# Patient Record
Sex: FEMALE | Race: BLACK OR AFRICAN AMERICAN | NOT HISPANIC OR LATINO | Employment: OTHER | URBAN - METROPOLITAN AREA
[De-identification: names, ages, dates, MRNs, and addresses within clinical notes are randomized per-mention and may not be internally consistent; named-entity substitution may affect disease eponyms.]

---

## 2020-05-12 ENCOUNTER — OFFICE VISIT (OUTPATIENT)
Dept: FAMILY MEDICINE | Facility: CLINIC | Age: 55
End: 2020-05-12
Payer: COMMERCIAL

## 2020-05-12 VITALS
DIASTOLIC BLOOD PRESSURE: 97 MMHG | HEART RATE: 70 BPM | BODY MASS INDEX: 22.91 KG/M2 | WEIGHT: 146 LBS | TEMPERATURE: 97 F | SYSTOLIC BLOOD PRESSURE: 142 MMHG | RESPIRATION RATE: 16 BRPM | HEIGHT: 67 IN

## 2020-05-12 DIAGNOSIS — M32.9 LUPUS: ICD-10-CM

## 2020-05-12 DIAGNOSIS — Z76.89 ENCOUNTER TO ESTABLISH CARE: ICD-10-CM

## 2020-05-12 DIAGNOSIS — R25.1 TREMOR OF BOTH HANDS: Chronic | ICD-10-CM

## 2020-05-12 DIAGNOSIS — I10 HTN (HYPERTENSION), BENIGN: Chronic | ICD-10-CM

## 2020-05-12 DIAGNOSIS — Z00.00 WELLNESS EXAMINATION: Primary | ICD-10-CM

## 2020-05-12 DIAGNOSIS — E03.9 HYPOTHYROIDISM, UNSPECIFIED TYPE: ICD-10-CM

## 2020-05-12 LAB
ABS NRBC COUNT: 0 X 10 3/UL (ref 0–0.01)
ABSOLUTE BASOPHIL: 0.04 X 10 3/UL (ref 0–0.22)
ABSOLUTE EOSINOPHIL: 0.11 X 10 3/UL (ref 0.04–0.54)
ABSOLUTE IMMATURE GRAN: 0 X 10 3/UL (ref 0–0.04)
ABSOLUTE LYMPHOCYTE: 1.92 X 10 3/UL (ref 0.86–4.75)
ABSOLUTE MONOCYTE: 0.74 X 10 3/UL (ref 0.22–1.08)
ALBUMIN SERPL-MCNC: 4.3 G/DL (ref 3.5–5.2)
ALBUMIN/GLOB SERPL ELPH: 1.2 {RATIO} (ref 1–2.7)
ALP ISOS SERPL LEV INH-CCNC: 114 U/L (ref 35–105)
ALT (SGPT): 12 U/L (ref 0–33)
ANION GAP SERPL CALC-SCNC: 13 MMOL/L (ref 8–17)
AST SERPL-CCNC: 14 U/L (ref 0–32)
B12: 503 PG/ML (ref 232–1245)
BASOPHILS NFR BLD: 0.8 % (ref 0.2–1.2)
BILIRUBIN, TOTAL: 0.29 MG/DL (ref 0–1.2)
BUN/CREAT SERPL: 13.3 (ref 6–20)
CALCIUM SERPL-MCNC: 9.3 MG/DL (ref 8.6–10.2)
CARBON DIOXIDE, CO2: 25 MMOL/L (ref 22–29)
CHLORIDE: 104 MMOL/L (ref 98–107)
CHOLEST SERPL-MSCNC: 149 MG/DL (ref 100–200)
CREAT SERPL-MCNC: 0.75 MG/DL (ref 0.5–0.9)
EOSINOPHIL NFR BLD: 2.2 % (ref 0.7–7)
ESTIMATED AVERAGE GLUCOSE: 112 MG/DL
GFR ESTIMATION: 80.83
GLOBULIN: 3.6 G/DL (ref 1.5–4.5)
GLUCOSE: 97 MG/DL (ref 74–106)
HBA1C MFR BLD: 5.5 % (ref 4–6)
HCT VFR BLD AUTO: 41.5 % (ref 37–47)
HDLC SERPL-MCNC: 43 MG/DL
HGB BLD-MCNC: 13.5 G/DL (ref 12–16)
IMMATURE GRANULOCYTES: 0 % (ref 0–0.5)
LDL/HDL RATIO: 2 (ref 1–3)
LDLC SERPL CALC-MCNC: 85.2 MG/DL (ref 0–100)
LYMPHOCYTES NFR BLD: 38.8 % (ref 19.3–53.1)
MCH RBC QN AUTO: 30.8 PG (ref 27–32)
MCHC RBC AUTO-ENTMCNC: 32.5 G/DL (ref 32–36)
MCV RBC AUTO: 94.7 FL (ref 82–100)
MONOCYTES NFR BLD: 14.9 % (ref 4.7–12.5)
NEUTROPHILS ABSOLUTE COUNT: 2.14 X 10 3/UL (ref 2.15–7.56)
NEUTROPHILS NFR BLD: 43.3 %
NUCLEATED RED BLOOD CELLS: 0 /100 WBC (ref 0–0.2)
PLATELET # BLD AUTO: 312 X 10 3/UL (ref 135–400)
POTASSIUM: 3.8 MMOL/L (ref 3.5–5.1)
PROT SNV-MCNC: 7.9 G/DL (ref 6.4–8.3)
RBC # BLD AUTO: 4.38 X 10 6/UL (ref 4.2–5.4)
RDW-SD: 50.5 FL (ref 37–54)
SODIUM: 142 MMOL/L (ref 136–145)
TRIGL SERPL-MCNC: 104 MG/DL (ref 0–150)
TSH SERPL DL<=0.005 MIU/L-ACNC: 0.92 UIU/ML (ref 0.27–4.2)
UREA NITROGEN (BUN): 10 MG/DL (ref 6–20)
VITAMIN D (25OHD): 34.2 NG/ML
WBC # BLD: 4.95 X 10 3/UL (ref 4.3–10.8)

## 2020-05-12 PROCEDURE — 3077F SYST BP >= 140 MM HG: CPT | Mod: CPTII,S$GLB,, | Performed by: FAMILY MEDICINE

## 2020-05-12 PROCEDURE — 99386 PREV VISIT NEW AGE 40-64: CPT | Mod: S$GLB,,, | Performed by: FAMILY MEDICINE

## 2020-05-12 PROCEDURE — 99386 PR PREVENTIVE VISIT,NEW,40-64: ICD-10-PCS | Mod: S$GLB,,, | Performed by: FAMILY MEDICINE

## 2020-05-12 PROCEDURE — 3080F DIAST BP >= 90 MM HG: CPT | Mod: CPTII,S$GLB,, | Performed by: FAMILY MEDICINE

## 2020-05-12 PROCEDURE — 3077F PR MOST RECENT SYSTOLIC BLOOD PRESSURE >= 140 MM HG: ICD-10-PCS | Mod: CPTII,S$GLB,, | Performed by: FAMILY MEDICINE

## 2020-05-12 PROCEDURE — 3080F PR MOST RECENT DIASTOLIC BLOOD PRESSURE >= 90 MM HG: ICD-10-PCS | Mod: CPTII,S$GLB,, | Performed by: FAMILY MEDICINE

## 2020-05-12 RX ORDER — FOLIC ACID 1 MG/1
1 TABLET ORAL DAILY
COMMUNITY
Start: 2020-03-18 | End: 2022-11-10

## 2020-05-12 RX ORDER — PROGESTERONE 200 MG/1
1 CAPSULE ORAL NIGHTLY
COMMUNITY
Start: 2020-04-27 | End: 2022-04-14

## 2020-05-12 RX ORDER — AMLODIPINE BESYLATE 10 MG/1
10 TABLET ORAL DAILY
COMMUNITY
End: 2020-05-12 | Stop reason: SDUPTHER

## 2020-05-12 RX ORDER — HYDROCHLOROTHIAZIDE 25 MG/1
25 TABLET ORAL DAILY
Qty: 30 TABLET | Refills: 0 | Status: SHIPPED | OUTPATIENT
Start: 2020-05-12 | End: 2020-05-25 | Stop reason: SDUPTHER

## 2020-05-12 RX ORDER — METHOTREXATE 2.5 MG/1
4 TABLET ORAL WEEKLY
Status: ON HOLD | COMMUNITY
Start: 2020-04-14 | End: 2021-02-11 | Stop reason: HOSPADM

## 2020-05-12 RX ORDER — ESTRADIOL 1 MG/1
1 TABLET ORAL DAILY
COMMUNITY
Start: 2020-04-14 | End: 2022-04-14

## 2020-05-12 RX ORDER — LEVOTHYROXINE SODIUM 50 UG/1
1 TABLET ORAL EVERY MORNING
COMMUNITY
Start: 2020-04-14 | End: 2020-05-25 | Stop reason: SDUPTHER

## 2020-05-12 RX ORDER — AMLODIPINE BESYLATE 10 MG/1
10 TABLET ORAL DAILY
Qty: 90 TABLET | Refills: 3 | Status: SHIPPED | OUTPATIENT
Start: 2020-05-12 | End: 2022-11-10

## 2020-05-12 NOTE — PROGRESS NOTES
Subjective:       Patient ID: Christen Bowman is a 55 y.o. female.    Chief Complaint: Establish Care (pt is here to get established with a PCP and get BP meds refilled. )    56 yo F here to establish care and for a wellness. She was seen by specialists in the past, not by PCP. Pt has hx of hypothyroidism, female hormone imbalance (sees endocrinologist in [place]), rheumatological issues for which she sees a specialist in [place].  Pt feels well, she is okay getting the yearly labs drawn today. We go through her health maintenance chart and fill in the blanks. She will ask her insurance whether they pay for Cologuard or how much a colonoscopy would be.   BP is not controlled. Pt states her BP usually runs high just like today. She is on amlodipine 10 mg. Discussed to add a thiazide.      Review of Systems   Constitutional: Negative for activity change, chills, fatigue, fever and unexpected weight change.   HENT: Negative for ear pain, rhinorrhea and trouble swallowing.    Eyes: Negative for pain.   Respiratory: Negative for cough, chest tightness, shortness of breath and wheezing.    Cardiovascular: Negative for chest pain and palpitations.   Gastrointestinal: Negative for abdominal distention, abdominal pain, constipation, diarrhea, nausea and vomiting.   Endocrine: Negative for cold intolerance and heat intolerance.   Genitourinary: Negative for dysuria, frequency and urgency.   Musculoskeletal: Negative for myalgias.   Skin: Negative for rash.   Neurological: Negative for dizziness, syncope, light-headedness and headaches.   Hematological: Does not bruise/bleed easily.   Psychiatric/Behavioral: Negative for agitation and confusion.       Objective:      Physical Exam   Constitutional: She appears well-developed.   HENT:   Right Ear: External ear normal.   Left Ear: External ear normal.   Mouth/Throat: Oropharynx is clear and moist.   Eyes: Conjunctivae and EOM are normal.   Neck: Normal range of motion.    Cardiovascular: Normal rate, regular rhythm and intact distal pulses.   Pulmonary/Chest: Effort normal and breath sounds normal.   Abdominal: Soft.   Musculoskeletal: Normal range of motion.   Neurological: She is alert.   Tremors in both hands   Skin: Skin is warm. Capillary refill takes less than 2 seconds.   Psychiatric: She has a normal mood and affect. Her behavior is normal.   Nursing note and vitals reviewed.      Assessment:       1. Wellness examination    2. Encounter to establish care    3. Lupus, RA, Raynauds etc - Rheumatologist in [place] Stable   4. HTN (hypertension), benign Adjusting regimen per notes   5. Hypothyroidism, unspecified type    6. Tremor of both hands  x 40 yrs Stable       Plan:       PROBLEM MAC Velásquez was seen today for establish care.    Diagnoses and all orders for this visit:    Wellness examination  -     CBC auto differential; Future  -     Comprehensive metabolic panel; Future  -     Lipid Panel; Future  -     Vitamin D; Future  -     Hemoglobin A1C; Future  -     TSH; Future  -     Vitamin B12; Future  -     CBC auto differential  -     Comprehensive metabolic panel  -     Lipid Panel  -     Vitamin D  -     Hemoglobin A1C  -     TSH  -     Vitamin B12    Encounter to establish care    Lupus, RA, Raynauds etc - Rheumatologist in [place]  Comments:  continue meds    HTN (hypertension), benign  Comments:  adding HCTZ  Orders:  -     CBC auto differential; Future  -     Comprehensive metabolic panel; Future  -     hydroCHLOROthiazide (HYDRODIURIL) 25 MG tablet; Take 1 tablet (25 mg total) by mouth once daily.  -     amLODIPine (NORVASC) 10 MG tablet; Take 1 tablet (10 mg total) by mouth once daily.  -     CBC auto differential  -     Comprehensive metabolic panel    Hypothyroidism, unspecified type  -     TSH; Future  -     TSH    Tremor of both hands  x 40 yrs  Comments:  worsening with being upset

## 2020-05-13 ENCOUNTER — PATIENT MESSAGE (OUTPATIENT)
Dept: FAMILY MEDICINE | Facility: CLINIC | Age: 55
End: 2020-05-13

## 2020-05-25 ENCOUNTER — OFFICE VISIT (OUTPATIENT)
Dept: FAMILY MEDICINE | Facility: CLINIC | Age: 55
End: 2020-05-25
Payer: COMMERCIAL

## 2020-05-25 VITALS
TEMPERATURE: 98 F | DIASTOLIC BLOOD PRESSURE: 94 MMHG | SYSTOLIC BLOOD PRESSURE: 131 MMHG | RESPIRATION RATE: 16 BRPM | HEIGHT: 67 IN | BODY MASS INDEX: 22.29 KG/M2 | OXYGEN SATURATION: 95 % | HEART RATE: 79 BPM | WEIGHT: 142 LBS

## 2020-05-25 DIAGNOSIS — Z71.2 ENCOUNTER TO DISCUSS TEST RESULTS: Primary | ICD-10-CM

## 2020-05-25 DIAGNOSIS — E03.9 HYPOTHYROIDISM, UNSPECIFIED TYPE: Chronic | ICD-10-CM

## 2020-05-25 DIAGNOSIS — I10 HTN (HYPERTENSION), BENIGN: Chronic | ICD-10-CM

## 2020-05-25 DIAGNOSIS — N95.1 MENOPAUSAL HOT FLUSHES: Chronic | ICD-10-CM

## 2020-05-25 PROCEDURE — 3080F DIAST BP >= 90 MM HG: CPT | Mod: CPTII,S$GLB,, | Performed by: FAMILY MEDICINE

## 2020-05-25 PROCEDURE — 99214 OFFICE O/P EST MOD 30 MIN: CPT | Mod: S$GLB,,, | Performed by: FAMILY MEDICINE

## 2020-05-25 PROCEDURE — 3080F PR MOST RECENT DIASTOLIC BLOOD PRESSURE >= 90 MM HG: ICD-10-PCS | Mod: CPTII,S$GLB,, | Performed by: FAMILY MEDICINE

## 2020-05-25 PROCEDURE — 3008F BODY MASS INDEX DOCD: CPT | Mod: CPTII,S$GLB,, | Performed by: FAMILY MEDICINE

## 2020-05-25 PROCEDURE — 99214 PR OFFICE/OUTPT VISIT, EST, LEVL IV, 30-39 MIN: ICD-10-PCS | Mod: S$GLB,,, | Performed by: FAMILY MEDICINE

## 2020-05-25 PROCEDURE — 3075F SYST BP GE 130 - 139MM HG: CPT | Mod: CPTII,S$GLB,, | Performed by: FAMILY MEDICINE

## 2020-05-25 PROCEDURE — 3075F PR MOST RECENT SYSTOLIC BLOOD PRESS GE 130-139MM HG: ICD-10-PCS | Mod: CPTII,S$GLB,, | Performed by: FAMILY MEDICINE

## 2020-05-25 PROCEDURE — 3008F PR BODY MASS INDEX (BMI) DOCUMENTED: ICD-10-PCS | Mod: CPTII,S$GLB,, | Performed by: FAMILY MEDICINE

## 2020-05-25 RX ORDER — LEVOTHYROXINE SODIUM 50 UG/1
50 TABLET ORAL EVERY MORNING
Qty: 90 TABLET | Refills: 1 | Status: SHIPPED | OUTPATIENT
Start: 2020-05-25

## 2020-05-25 RX ORDER — HYDROCHLOROTHIAZIDE 25 MG/1
25 TABLET ORAL DAILY
Qty: 90 TABLET | Refills: 3 | Status: ON HOLD | OUTPATIENT
Start: 2020-05-25 | End: 2021-02-11 | Stop reason: HOSPADM

## 2020-05-25 NOTE — PROGRESS NOTES
Subjective:       Patient ID: Christen Bowman is a 55 y.o. female.    Chief Complaint: No chief complaint on file.    56 yo F here for discussion of lab results and chronicx.  Labs: are all normal, including a1c, TSH (on meds), liver and kindey.  Pt needs refills on levothyroxine 50 mcg. Pt has no AE and she is compliant with meds.  HTN: we added hydrochlorothiazide to her amlodipine 10 mg and her BP has dropped significantly when she tests it at home it is just about controlled in clinic. Pt has had some fatigue spells which might be from having lower BP now. Discussed if this does not resolve she should halfer her hctz.  Pt is getting treated for exaggerated perimenopausal symptoms such as hot flashes in [place]. She is on a small dose of testosterone, progesterone and estradiol. She feels well but if she could help it, she'd rather not go to [place] every time she needs a check-up. Discussed to send her to endocrinology.     Review of Systems   Constitutional: Negative for activity change, chills, fatigue, fever and unexpected weight change.   HENT: Negative for ear pain, rhinorrhea and trouble swallowing.    Eyes: Negative for pain.   Respiratory: Negative for cough, chest tightness, shortness of breath and wheezing.    Cardiovascular: Negative for chest pain and palpitations.   Gastrointestinal: Negative for abdominal distention, abdominal pain, constipation, diarrhea, nausea and vomiting.   Endocrine: Negative for cold intolerance and heat intolerance.   Genitourinary: Negative for dysuria, frequency and urgency.   Musculoskeletal: Negative for myalgias.   Skin: Negative for rash.   Neurological: Negative for dizziness, syncope, light-headedness and headaches.   Hematological: Does not bruise/bleed easily.   Psychiatric/Behavioral: Negative for agitation and confusion.       Objective:      Physical Exam   Constitutional: She appears well-developed.   HENT:   Right Ear: External ear normal.   Left Ear: External  ear normal.   Mouth/Throat: Oropharynx is clear and moist.   Eyes: Conjunctivae and EOM are normal.   Neck: Normal range of motion.   Cardiovascular: Normal rate, regular rhythm and intact distal pulses.   Pulmonary/Chest: Effort normal and breath sounds normal.   Abdominal: Soft.   Musculoskeletal: Normal range of motion.   Neurological: She is alert.   Skin: Skin is warm. Capillary refill takes less than 2 seconds.   Psychiatric: She has a normal mood and affect.   Nursing note and vitals reviewed.      Assessment:       1. Encounter to discuss test results    2. Hypothyroidism, unspecified type Continue current regimen   3. HTN (hypertension), benign Adjusting regimen per notes   4. Menopausal hot flushes        Plan:       PROBLEM LIST     Diagnoses and all orders for this visit:    Encounter to discuss test results  Comments:  all normal    Hypothyroidism, unspecified type  Comments:  controlled  Orders:  -     levothyroxine (SYNTHROID) 50 MCG tablet; Take 1 tablet (50 mcg total) by mouth every morning.    HTN (hypertension), benign  Comments:  HCTZ refilled for a year  Orders:  -     hydroCHLOROthiazide (HYDRODIURIL) 25 MG tablet; Take 1 tablet (25 mg total) by mouth once daily.    Menopausal hot flushes  Comments:  referral to endocrinology  Orders:  -     Ambulatory referral/consult to Endocrinology; Future

## 2020-08-17 PROBLEM — Z00.00 WELLNESS EXAMINATION: Status: RESOLVED | Noted: 2020-05-12 | Resolved: 2020-08-17

## 2021-02-05 ENCOUNTER — HOSPITAL ENCOUNTER (INPATIENT)
Facility: HOSPITAL | Age: 56
LOS: 3 days | Discharge: HOME HEALTH | DRG: 546 | End: 2021-02-11
Attending: EMERGENCY MEDICINE | Admitting: HOSPITALIST
Payer: COMMERCIAL

## 2021-02-05 ENCOUNTER — OFFICE VISIT (OUTPATIENT)
Dept: RHEUMATOLOGY | Facility: CLINIC | Age: 56
End: 2021-02-05
Payer: COMMERCIAL

## 2021-02-05 VITALS — DIASTOLIC BLOOD PRESSURE: 85 MMHG | SYSTOLIC BLOOD PRESSURE: 139 MMHG | HEART RATE: 108 BPM

## 2021-02-05 DIAGNOSIS — M32.9 SYSTEMIC LUPUS ERYTHEMATOSUS, UNSPECIFIED SLE TYPE, UNSPECIFIED ORGAN INVOLVEMENT STATUS: Primary | ICD-10-CM

## 2021-02-05 DIAGNOSIS — M32.9 LUPUS (SYSTEMIC LUPUS ERYTHEMATOSUS): ICD-10-CM

## 2021-02-05 DIAGNOSIS — R73.9 HYPERGLYCEMIA: Primary | ICD-10-CM

## 2021-02-05 DIAGNOSIS — I49.9 ARRHYTHMIA: ICD-10-CM

## 2021-02-05 DIAGNOSIS — I96 GANGRENE OF RIGHT FOOT: ICD-10-CM

## 2021-02-05 DIAGNOSIS — I96 GANGRENE OF TOE OF LEFT FOOT: ICD-10-CM

## 2021-02-05 DIAGNOSIS — R73.9 STEROID-INDUCED HYPERGLYCEMIA: Primary | ICD-10-CM

## 2021-02-05 DIAGNOSIS — R73.9 HYPERGLYCEMIA: ICD-10-CM

## 2021-02-05 DIAGNOSIS — E03.9 HYPOTHYROIDISM, UNSPECIFIED TYPE: ICD-10-CM

## 2021-02-05 DIAGNOSIS — T38.0X5A STEROID-INDUCED HYPERGLYCEMIA: Primary | ICD-10-CM

## 2021-02-05 DIAGNOSIS — M32.9 SYSTEMIC LUPUS ERYTHEMATOSUS, UNSPECIFIED SLE TYPE, UNSPECIFIED ORGAN INVOLVEMENT STATUS: ICD-10-CM

## 2021-02-05 DIAGNOSIS — I73.01 RAYNAUD'S DISEASE WITH GANGRENE: ICD-10-CM

## 2021-02-05 DIAGNOSIS — R78.81 BACTEREMIA: ICD-10-CM

## 2021-02-05 DIAGNOSIS — I96 GANGRENE OF BOTH FEET: ICD-10-CM

## 2021-02-05 DIAGNOSIS — I96 GANGRENE: ICD-10-CM

## 2021-02-05 LAB
ALBUMIN SERPL BCP-MCNC: 3.2 G/DL (ref 3.5–5.2)
ALP SERPL-CCNC: 100 U/L (ref 55–135)
ALT SERPL W/O P-5'-P-CCNC: 35 U/L (ref 10–44)
ANION GAP SERPL CALC-SCNC: 10 MMOL/L (ref 8–16)
ANISOCYTOSIS BLD QL SMEAR: SLIGHT
AST SERPL-CCNC: 21 U/L (ref 10–40)
BASOPHILS # BLD AUTO: ABNORMAL K/UL (ref 0–0.2)
BASOPHILS NFR BLD: 0 % (ref 0–1.9)
BILIRUB SERPL-MCNC: 0.3 MG/DL (ref 0.1–1)
BUN SERPL-MCNC: 11 MG/DL (ref 6–20)
CALCIUM SERPL-MCNC: 9.1 MG/DL (ref 8.7–10.5)
CHLORIDE SERPL-SCNC: 94 MMOL/L (ref 95–110)
CO2 SERPL-SCNC: 33 MMOL/L (ref 23–29)
CREAT SERPL-MCNC: 0.8 MG/DL (ref 0.5–1.4)
CRP SERPL-MCNC: 68.6 MG/L (ref 0–8.2)
CTP QC/QA: YES
DIFFERENTIAL METHOD: ABNORMAL
EOSINOPHIL # BLD AUTO: ABNORMAL K/UL (ref 0–0.5)
EOSINOPHIL NFR BLD: 0 % (ref 0–8)
ERYTHROCYTE [DISTWIDTH] IN BLOOD BY AUTOMATED COUNT: 17.2 % (ref 11.5–14.5)
ERYTHROCYTE [SEDIMENTATION RATE] IN BLOOD BY WESTERGREN METHOD: 22 MM/HR (ref 0–36)
EST. GFR  (AFRICAN AMERICAN): >60 ML/MIN/1.73 M^2
EST. GFR  (NON AFRICAN AMERICAN): >60 ML/MIN/1.73 M^2
GLUCOSE SERPL-MCNC: 93 MG/DL (ref 70–110)
HCT VFR BLD AUTO: 40.1 % (ref 37–48.5)
HGB BLD-MCNC: 13.1 G/DL (ref 12–16)
HOWELL-JOLLY BOD BLD QL SMEAR: ABNORMAL
IMM GRANULOCYTES # BLD AUTO: ABNORMAL K/UL (ref 0–0.04)
IMM GRANULOCYTES NFR BLD AUTO: ABNORMAL % (ref 0–0.5)
LYMPHOCYTES # BLD AUTO: ABNORMAL K/UL (ref 1–4.8)
LYMPHOCYTES NFR BLD: 13 % (ref 18–48)
MCH RBC QN AUTO: 32.3 PG (ref 27–31)
MCHC RBC AUTO-ENTMCNC: 32.7 G/DL (ref 32–36)
MCV RBC AUTO: 99 FL (ref 82–98)
METAMYELOCYTES NFR BLD MANUAL: 5 %
MONOCYTES # BLD AUTO: ABNORMAL K/UL (ref 0.3–1)
MONOCYTES NFR BLD: 11 % (ref 4–15)
NEUTROPHILS # BLD AUTO: ABNORMAL K/UL (ref 1.8–7.7)
NEUTROPHILS NFR BLD: 67 % (ref 38–73)
NEUTS BAND NFR BLD MANUAL: 3 %
NRBC BLD-RTO: 0 /100 WBC
PLATELET # BLD AUTO: 349 K/UL (ref 150–350)
PLATELET BLD QL SMEAR: ABNORMAL
PMV BLD AUTO: 9.9 FL (ref 9.2–12.9)
POCT GLUCOSE: 105 MG/DL (ref 70–110)
POLYCHROMASIA BLD QL SMEAR: ABNORMAL
POTASSIUM SERPL-SCNC: 4.2 MMOL/L (ref 3.5–5.1)
PROMYELOCYTES NFR BLD MANUAL: 1 %
PROT SERPL-MCNC: 7.4 G/DL (ref 6–8.4)
RBC # BLD AUTO: 4.05 M/UL (ref 4–5.4)
SARS-COV-2 RDRP RESP QL NAA+PROBE: NEGATIVE
SODIUM SERPL-SCNC: 137 MMOL/L (ref 136–145)
WBC # BLD AUTO: 22.62 K/UL (ref 3.9–12.7)

## 2021-02-05 PROCEDURE — 85007 BL SMEAR W/DIFF WBC COUNT: CPT

## 2021-02-05 PROCEDURE — 96375 TX/PRO/DX INJ NEW DRUG ADDON: CPT

## 2021-02-05 PROCEDURE — 99223 PR INITIAL HOSPITAL CARE,LEVL III: ICD-10-PCS | Mod: GT,,, | Performed by: INTERNAL MEDICINE

## 2021-02-05 PROCEDURE — 63600175 PHARM REV CODE 636 W HCPCS: Performed by: STUDENT IN AN ORGANIZED HEALTH CARE EDUCATION/TRAINING PROGRAM

## 2021-02-05 PROCEDURE — 1125F PR PAIN SEVERITY QUANTIFIED, PAIN PRESENT: ICD-10-PCS | Mod: S$GLB,,, | Performed by: INTERNAL MEDICINE

## 2021-02-05 PROCEDURE — 25000003 PHARM REV CODE 250: Performed by: STUDENT IN AN ORGANIZED HEALTH CARE EDUCATION/TRAINING PROGRAM

## 2021-02-05 PROCEDURE — 99999 PR PBB SHADOW E&M-EST. PATIENT-LVL III: CPT | Mod: PBBFAC,,, | Performed by: INTERNAL MEDICINE

## 2021-02-05 PROCEDURE — 3079F DIAST BP 80-89 MM HG: CPT | Mod: CPTII,S$GLB,, | Performed by: INTERNAL MEDICINE

## 2021-02-05 PROCEDURE — 85652 RBC SED RATE AUTOMATED: CPT

## 2021-02-05 PROCEDURE — 96376 TX/PRO/DX INJ SAME DRUG ADON: CPT

## 2021-02-05 PROCEDURE — 85027 COMPLETE CBC AUTOMATED: CPT

## 2021-02-05 PROCEDURE — 96366 THER/PROPH/DIAG IV INF ADDON: CPT

## 2021-02-05 PROCEDURE — 1125F AMNT PAIN NOTED PAIN PRSNT: CPT | Mod: S$GLB,,, | Performed by: INTERNAL MEDICINE

## 2021-02-05 PROCEDURE — 96365 THER/PROPH/DIAG IV INF INIT: CPT

## 2021-02-05 PROCEDURE — G0378 HOSPITAL OBSERVATION PER HR: HCPCS

## 2021-02-05 PROCEDURE — 86140 C-REACTIVE PROTEIN: CPT

## 2021-02-05 PROCEDURE — 25000003 PHARM REV CODE 250: Performed by: PHYSICIAN ASSISTANT

## 2021-02-05 PROCEDURE — 99205 PR OFFICE/OUTPT VISIT, NEW, LEVL V, 60-74 MIN: ICD-10-PCS | Mod: S$GLB,,, | Performed by: INTERNAL MEDICINE

## 2021-02-05 PROCEDURE — 99283 PR EMERGENCY DEPT VISIT,LEVEL III: ICD-10-PCS | Mod: ,,, | Performed by: PHYSICIAN ASSISTANT

## 2021-02-05 PROCEDURE — 96367 TX/PROPH/DG ADDL SEQ IV INF: CPT

## 2021-02-05 PROCEDURE — 3075F SYST BP GE 130 - 139MM HG: CPT | Mod: CPTII,S$GLB,, | Performed by: INTERNAL MEDICINE

## 2021-02-05 PROCEDURE — 80053 COMPREHEN METABOLIC PANEL: CPT

## 2021-02-05 PROCEDURE — 3079F PR MOST RECENT DIASTOLIC BLOOD PRESSURE 80-89 MM HG: ICD-10-PCS | Mod: CPTII,S$GLB,, | Performed by: INTERNAL MEDICINE

## 2021-02-05 PROCEDURE — 87040 BLOOD CULTURE FOR BACTERIA: CPT

## 2021-02-05 PROCEDURE — 99205 OFFICE O/P NEW HI 60 MIN: CPT | Mod: S$GLB,,, | Performed by: INTERNAL MEDICINE

## 2021-02-05 PROCEDURE — U0002 COVID-19 LAB TEST NON-CDC: HCPCS | Performed by: EMERGENCY MEDICINE

## 2021-02-05 PROCEDURE — 99283 EMERGENCY DEPT VISIT LOW MDM: CPT | Mod: ,,, | Performed by: PHYSICIAN ASSISTANT

## 2021-02-05 PROCEDURE — 99999 PR PBB SHADOW E&M-EST. PATIENT-LVL III: ICD-10-PCS | Mod: PBBFAC,,, | Performed by: INTERNAL MEDICINE

## 2021-02-05 PROCEDURE — 99285 EMERGENCY DEPT VISIT HI MDM: CPT | Mod: 25

## 2021-02-05 PROCEDURE — 63600175 PHARM REV CODE 636 W HCPCS: Performed by: PHYSICIAN ASSISTANT

## 2021-02-05 PROCEDURE — 99223 1ST HOSP IP/OBS HIGH 75: CPT | Mod: GT,,, | Performed by: INTERNAL MEDICINE

## 2021-02-05 PROCEDURE — 3075F PR MOST RECENT SYSTOLIC BLOOD PRESS GE 130-139MM HG: ICD-10-PCS | Mod: CPTII,S$GLB,, | Performed by: INTERNAL MEDICINE

## 2021-02-05 RX ORDER — IBUPROFEN 200 MG
24 TABLET ORAL
Status: DISCONTINUED | OUTPATIENT
Start: 2021-02-05 | End: 2021-02-06 | Stop reason: SDUPTHER

## 2021-02-05 RX ORDER — PANTOPRAZOLE SODIUM 40 MG/1
40 TABLET, DELAYED RELEASE ORAL DAILY
Status: DISCONTINUED | OUTPATIENT
Start: 2021-02-06 | End: 2021-02-11 | Stop reason: HOSPADM

## 2021-02-05 RX ORDER — SODIUM CHLORIDE 0.9 % (FLUSH) 0.9 %
10 SYRINGE (ML) INJECTION
Status: CANCELLED | OUTPATIENT
Start: 2021-02-05

## 2021-02-05 RX ORDER — ATOVAQUONE 750 MG/5ML
1500 SUSPENSION ORAL DAILY
Status: DISCONTINUED | OUTPATIENT
Start: 2021-02-05 | End: 2021-02-09

## 2021-02-05 RX ORDER — PREDNISONE 20 MG/1
60 TABLET ORAL DAILY
COMMUNITY
Start: 2021-01-28 | End: 2022-11-10

## 2021-02-05 RX ORDER — CLINDAMYCIN HYDROCHLORIDE 300 MG/1
300 CAPSULE ORAL EVERY 6 HOURS
Status: ON HOLD | COMMUNITY
End: 2021-02-11 | Stop reason: HOSPADM

## 2021-02-05 RX ORDER — IBUPROFEN 200 MG
16 TABLET ORAL
Status: DISCONTINUED | OUTPATIENT
Start: 2021-02-05 | End: 2021-02-06 | Stop reason: SDUPTHER

## 2021-02-05 RX ORDER — INSULIN ASPART 100 [IU]/ML
1-10 INJECTION, SOLUTION INTRAVENOUS; SUBCUTANEOUS
Status: DISCONTINUED | OUTPATIENT
Start: 2021-02-05 | End: 2021-02-06 | Stop reason: SDUPTHER

## 2021-02-05 RX ORDER — DILTIAZEM HYDROCHLORIDE 60 MG/1
360 TABLET, FILM COATED ORAL DAILY
Status: DISCONTINUED | OUTPATIENT
Start: 2021-02-06 | End: 2021-02-09

## 2021-02-05 RX ORDER — GLUCAGON 1 MG
1 KIT INJECTION
Status: DISCONTINUED | OUTPATIENT
Start: 2021-02-05 | End: 2021-02-06 | Stop reason: SDUPTHER

## 2021-02-05 RX ORDER — TALC
6 POWDER (GRAM) TOPICAL NIGHTLY PRN
Status: CANCELLED | OUTPATIENT
Start: 2021-02-05

## 2021-02-05 RX ORDER — OXYCODONE AND ACETAMINOPHEN 10; 325 MG/1; MG/1
1 TABLET ORAL EVERY 4 HOURS PRN
COMMUNITY
End: 2021-03-01 | Stop reason: SDUPTHER

## 2021-02-05 RX ORDER — CALCIUM CARBONATE/VITAMIN D3 250-3.125
1 TABLET ORAL ONCE
Status: COMPLETED | OUTPATIENT
Start: 2021-02-05 | End: 2021-02-05

## 2021-02-05 RX ORDER — DILTIAZEM HYDROCHLORIDE 360 MG/1
360 CAPSULE, EXTENDED RELEASE ORAL DAILY
Status: ON HOLD | COMMUNITY
End: 2021-02-11 | Stop reason: HOSPADM

## 2021-02-05 RX ORDER — LEVOTHYROXINE SODIUM 50 UG/1
50 TABLET ORAL
Status: DISCONTINUED | OUTPATIENT
Start: 2021-02-06 | End: 2021-02-11 | Stop reason: HOSPADM

## 2021-02-05 RX ORDER — CEFEPIME HYDROCHLORIDE 2 G/1
2 INJECTION, POWDER, FOR SOLUTION INTRAVENOUS
Status: COMPLETED | OUTPATIENT
Start: 2021-02-05 | End: 2021-02-05

## 2021-02-05 RX ORDER — OXYCODONE AND ACETAMINOPHEN 10; 325 MG/1; MG/1
1 TABLET ORAL ONCE
Status: COMPLETED | OUTPATIENT
Start: 2021-02-05 | End: 2021-02-05

## 2021-02-05 RX ORDER — DICLOFENAC SODIUM 10 MG/G
1 GEL TOPICAL
COMMUNITY
End: 2022-11-10

## 2021-02-05 RX ORDER — FOLIC ACID 1 MG/1
1 TABLET ORAL DAILY
Status: DISCONTINUED | OUTPATIENT
Start: 2021-02-06 | End: 2021-02-11 | Stop reason: HOSPADM

## 2021-02-05 RX ADMIN — DEXTROSE 1000 MG: 5 SOLUTION INTRAVENOUS at 10:02

## 2021-02-05 RX ADMIN — VANCOMYCIN HYDROCHLORIDE 1500 MG: 1.5 INJECTION, POWDER, LYOPHILIZED, FOR SOLUTION INTRAVENOUS at 05:02

## 2021-02-05 RX ADMIN — CEFEPIME 2 G: 2 INJECTION, POWDER, FOR SOLUTION INTRAVENOUS at 05:02

## 2021-02-05 RX ADMIN — Medication 1 TABLET: at 10:02

## 2021-02-05 RX ADMIN — OXYCODONE AND ACETAMINOPHEN 1 TABLET: 325; 10 TABLET ORAL at 11:02

## 2021-02-05 RX ADMIN — ATOVAQUONE 1500 MG: 750 SUSPENSION ORAL at 10:02

## 2021-02-05 ASSESSMENT — ROUTINE ASSESSMENT OF PATIENT INDEX DATA (RAPID3)
PAIN SCORE: 6.5
AM STIFFNESS SCORE: 0, NO
MDHAQ FUNCTION SCORE: 1
PATIENT GLOBAL ASSESSMENT SCORE: 9.5
TOTAL RAPID3 SCORE: 6.44
FATIGUE SCORE: 10
PSYCHOLOGICAL DISTRESS SCORE: 3.3

## 2021-02-06 PROBLEM — I73.01 RAYNAUD'S DISEASE WITH GANGRENE: Status: ACTIVE | Noted: 2021-02-06

## 2021-02-06 PROBLEM — I96 GANGRENE OF TOE OF LEFT FOOT: Status: ACTIVE | Noted: 2021-02-06

## 2021-02-06 PROBLEM — R78.81 BACTEREMIA: Status: ACTIVE | Noted: 2021-02-06

## 2021-02-06 LAB
ALBUMIN SERPL BCP-MCNC: 2.9 G/DL (ref 3.5–5.2)
ALP SERPL-CCNC: 99 U/L (ref 55–135)
ALT SERPL W/O P-5'-P-CCNC: 30 U/L (ref 10–44)
ANION GAP SERPL CALC-SCNC: 10 MMOL/L (ref 8–16)
ANISOCYTOSIS BLD QL SMEAR: SLIGHT
ASCENDING AORTA: 2.57 CM
AST SERPL-CCNC: 15 U/L (ref 10–40)
AV INDEX (PROSTH): 1.1
AV MEAN GRADIENT: 5 MMHG
AV PEAK GRADIENT: 7 MMHG
AV VALVE AREA: 3.9 CM2
AV VELOCITY RATIO: 0.96
BACTERIA #/AREA URNS AUTO: NORMAL /HPF
BASOPHILS NFR BLD: 0 % (ref 0–1.9)
BILIRUB SERPL-MCNC: 0.3 MG/DL (ref 0.1–1)
BILIRUB UR QL STRIP: NEGATIVE
BSA FOR ECHO PROCEDURE: 1.66 M2
BUN SERPL-MCNC: 18 MG/DL (ref 6–20)
CALCIUM SERPL-MCNC: 9.1 MG/DL (ref 8.7–10.5)
CCP AB SER IA-ACNC: 0.8 U/ML
CHLORIDE SERPL-SCNC: 98 MMOL/L (ref 95–110)
CLARITY UR REFRACT.AUTO: ABNORMAL
CO2 SERPL-SCNC: 28 MMOL/L (ref 23–29)
COLOR UR AUTO: YELLOW
CREAT SERPL-MCNC: 1 MG/DL (ref 0.5–1.4)
CREAT UR-MCNC: 93 MG/DL (ref 15–325)
CV ECHO LV RWT: 0.51 CM
DIFFERENTIAL METHOD: ABNORMAL
DOP CALC AO PEAK VEL: 1.31 M/S
DOP CALC AO VTI: 18.12 CM
DOP CALC LVOT AREA: 3.5 CM2
DOP CALC LVOT DIAMETER: 2.12 CM
DOP CALC LVOT PEAK VEL: 1.26 M/S
DOP CALC LVOT STROKE VOLUME: 70.63 CM3
DOP CALCLVOT PEAK VEL VTI: 20.02 CM
E/E' RATIO: 5.94 M/S
ECHO LV POSTERIOR WALL: 0.9 CM (ref 0.6–1.1)
EOSINOPHIL NFR BLD: 0 % (ref 0–8)
ERYTHROCYTE [DISTWIDTH] IN BLOOD BY AUTOMATED COUNT: 16.8 % (ref 11.5–14.5)
EST. GFR  (AFRICAN AMERICAN): >60 ML/MIN/1.73 M^2
EST. GFR  (NON AFRICAN AMERICAN): >60 ML/MIN/1.73 M^2
FRACTIONAL SHORTENING: 14 % (ref 28–44)
GIANT PLATELETS BLD QL SMEAR: PRESENT
GLUCOSE SERPL-MCNC: 273 MG/DL (ref 70–110)
GLUCOSE UR QL STRIP: ABNORMAL
HCT VFR BLD AUTO: 38.1 % (ref 37–48.5)
HGB BLD-MCNC: 12.3 G/DL (ref 12–16)
HGB UR QL STRIP: NEGATIVE
HYPOCHROMIA BLD QL SMEAR: ABNORMAL
IMM GRANULOCYTES # BLD AUTO: ABNORMAL K/UL (ref 0–0.04)
IMM GRANULOCYTES NFR BLD AUTO: ABNORMAL % (ref 0–0.5)
INTERVENTRICULAR SEPTUM: 1 CM (ref 0.6–1.1)
IVRT: 100.86 MSEC
KETONES UR QL STRIP: NEGATIVE
LA MAJOR: 4.81 CM
LA MINOR: 4.76 CM
LA WIDTH: 3.1 CM
LEFT ATRIUM SIZE: 2.46 CM
LEFT ATRIUM VOLUME INDEX MOD: 21.1 ML/M2
LEFT ATRIUM VOLUME INDEX: 18.6 ML/M2
LEFT ATRIUM VOLUME MOD: 35.17 CM3
LEFT ATRIUM VOLUME: 31.02 CM3
LEFT INTERNAL DIMENSION IN SYSTOLE: 3 CM (ref 2.1–4)
LEFT VENTRICLE DIASTOLIC VOLUME INDEX: 16.22 ML/M2
LEFT VENTRICLE DIASTOLIC VOLUME: 27.08 ML
LEFT VENTRICLE MASS INDEX: 57 G/M2
LEFT VENTRICLE SYSTOLIC VOLUME INDEX: 7.3 ML/M2
LEFT VENTRICLE SYSTOLIC VOLUME: 12.15 ML
LEFT VENTRICULAR INTERNAL DIMENSION IN DIASTOLE: 3.5 CM (ref 3.5–6)
LEFT VENTRICULAR MASS: 95.94 G
LEUKOCYTE ESTERASE UR QL STRIP: NEGATIVE
LV LATERAL E/E' RATIO: 6.57 M/S
LV SEPTAL E/E' RATIO: 5.41 M/S
LYMPHOCYTES NFR BLD: 12 % (ref 18–48)
MCH RBC QN AUTO: 31.5 PG (ref 27–31)
MCHC RBC AUTO-ENTMCNC: 32.3 G/DL (ref 32–36)
MCV RBC AUTO: 98 FL (ref 82–98)
MICROSCOPIC COMMENT: NORMAL
MONOCYTES NFR BLD: 8 % (ref 4–15)
MV PEAK E VEL: 0.92 M/S
NEUTROPHILS NFR BLD: 78 % (ref 38–73)
NEUTS BAND NFR BLD MANUAL: 2 %
NITRITE UR QL STRIP: NEGATIVE
NRBC BLD-RTO: 0 /100 WBC
OVALOCYTES BLD QL SMEAR: ABNORMAL
PH UR STRIP: 5 [PH] (ref 5–8)
PISA TR MAX VEL: 2.15 M/S
PLATELET # BLD AUTO: 380 K/UL (ref 150–350)
PLATELET BLD QL SMEAR: ABNORMAL
PMV BLD AUTO: 10.3 FL (ref 9.2–12.9)
POCT GLUCOSE: 133 MG/DL (ref 70–110)
POCT GLUCOSE: 232 MG/DL (ref 70–110)
POCT GLUCOSE: 241 MG/DL (ref 70–110)
POCT GLUCOSE: 309 MG/DL (ref 70–110)
POCT GLUCOSE: 94 MG/DL (ref 70–110)
POIKILOCYTOSIS BLD QL SMEAR: SLIGHT
POLYCHROMASIA BLD QL SMEAR: ABNORMAL
POTASSIUM SERPL-SCNC: 4.3 MMOL/L (ref 3.5–5.1)
PROT SERPL-MCNC: 7.2 G/DL (ref 6–8.4)
PROT UR QL STRIP: NEGATIVE
PROT UR-MCNC: 21 MG/DL (ref 0–15)
PROT/CREAT UR: 0.23 MG/G{CREAT} (ref 0–0.2)
RA MAJOR: 4.05 CM
RA PRESSURE: 3 MMHG
RA WIDTH: 2.84 CM
RBC # BLD AUTO: 3.9 M/UL (ref 4–5.4)
RBC #/AREA URNS AUTO: 0 /HPF (ref 0–4)
RHEUMATOID FACT SERPL-ACNC: <10 IU/ML (ref 0–15)
RIGHT VENTRICULAR END-DIASTOLIC DIMENSION: 2.75 CM
RV TISSUE DOPPLER FREE WALL SYSTOLIC VELOCITY 1 (APICAL 4 CHAMBER VIEW): 14.35 CM/S
SCHISTOCYTES BLD QL SMEAR: ABNORMAL
SINUS: 2.69 CM
SODIUM SERPL-SCNC: 136 MMOL/L (ref 136–145)
SP GR UR STRIP: 1.03 (ref 1–1.03)
SQUAMOUS #/AREA URNS AUTO: 1 /HPF
STJ: 2.58 CM
TARGETS BLD QL SMEAR: ABNORMAL
TDI LATERAL: 0.14 M/S
TDI SEPTAL: 0.17 M/S
TDI: 0.16 M/S
TR MAX PG: 18 MMHG
TRICUSPID ANNULAR PLANE SYSTOLIC EXCURSION: 1.94 CM
TV REST PULMONARY ARTERY PRESSURE: 21 MMHG
URATE CRY UR QL COMP ASSIST: NORMAL
URN SPEC COLLECT METH UR: ABNORMAL
WBC # BLD AUTO: 31.83 K/UL (ref 3.9–12.7)
WBC #/AREA URNS AUTO: 1 /HPF (ref 0–5)
WBC TOXIC VACUOLES BLD QL SMEAR: PRESENT
YEAST UR QL AUTO: NORMAL

## 2021-02-06 PROCEDURE — C1751 CATH, INF, PER/CENT/MIDLINE: HCPCS

## 2021-02-06 PROCEDURE — 86039 ANTINUCLEAR ANTIBODIES (ANA): CPT

## 2021-02-06 PROCEDURE — 86147 CARDIOLIPIN ANTIBODY EA IG: CPT

## 2021-02-06 PROCEDURE — 86235 NUCLEAR ANTIGEN ANTIBODY: CPT | Mod: 59

## 2021-02-06 PROCEDURE — 25000003 PHARM REV CODE 250: Performed by: STUDENT IN AN ORGANIZED HEALTH CARE EDUCATION/TRAINING PROGRAM

## 2021-02-06 PROCEDURE — 86703 HIV-1/HIV-2 1 RESULT ANTBDY: CPT

## 2021-02-06 PROCEDURE — 86200 CCP ANTIBODY: CPT

## 2021-02-06 PROCEDURE — 86146 BETA-2 GLYCOPROTEIN ANTIBODY: CPT | Mod: 59

## 2021-02-06 PROCEDURE — 83520 IMMUNOASSAY QUANT NOS NONAB: CPT

## 2021-02-06 PROCEDURE — 86706 HEP B SURFACE ANTIBODY: CPT

## 2021-02-06 PROCEDURE — G0378 HOSPITAL OBSERVATION PER HR: HCPCS

## 2021-02-06 PROCEDURE — 99205 PR OFFICE/OUTPT VISIT, NEW, LEVL V, 60-74 MIN: ICD-10-PCS | Mod: ,,, | Performed by: PHYSICIAN ASSISTANT

## 2021-02-06 PROCEDURE — 86431 RHEUMATOID FACTOR QUANT: CPT

## 2021-02-06 PROCEDURE — 83520 IMMUNOASSAY QUANT NOS NONAB: CPT | Mod: 59

## 2021-02-06 PROCEDURE — 63600175 PHARM REV CODE 636 W HCPCS: Performed by: STUDENT IN AN ORGANIZED HEALTH CARE EDUCATION/TRAINING PROGRAM

## 2021-02-06 PROCEDURE — 99205 OFFICE O/P NEW HI 60 MIN: CPT | Mod: ,,, | Performed by: PHYSICIAN ASSISTANT

## 2021-02-06 PROCEDURE — 86803 HEPATITIS C AB TEST: CPT

## 2021-02-06 PROCEDURE — 86592 SYPHILIS TEST NON-TREP QUAL: CPT

## 2021-02-06 PROCEDURE — 83516 IMMUNOASSAY NONANTIBODY: CPT

## 2021-02-06 PROCEDURE — 82570 ASSAY OF URINE CREATININE: CPT

## 2021-02-06 PROCEDURE — 86038 ANTINUCLEAR ANTIBODIES: CPT

## 2021-02-06 PROCEDURE — 99233 PR SUBSEQUENT HOSPITAL CARE,LEVL III: ICD-10-PCS | Mod: GT,,, | Performed by: INTERNAL MEDICINE

## 2021-02-06 PROCEDURE — 36415 COLL VENOUS BLD VENIPUNCTURE: CPT

## 2021-02-06 PROCEDURE — 85007 BL SMEAR W/DIFF WBC COUNT: CPT

## 2021-02-06 PROCEDURE — 85613 RUSSELL VIPER VENOM DILUTED: CPT

## 2021-02-06 PROCEDURE — 85027 COMPLETE CBC AUTOMATED: CPT

## 2021-02-06 PROCEDURE — 83516 IMMUNOASSAY NONANTIBODY: CPT | Mod: 59

## 2021-02-06 PROCEDURE — 99233 SBSQ HOSP IP/OBS HIGH 50: CPT | Mod: GT,,, | Performed by: INTERNAL MEDICINE

## 2021-02-06 PROCEDURE — 87340 HEPATITIS B SURFACE AG IA: CPT

## 2021-02-06 PROCEDURE — 96376 TX/PRO/DX INJ SAME DRUG ADON: CPT

## 2021-02-06 PROCEDURE — 80053 COMPREHEN METABOLIC PANEL: CPT

## 2021-02-06 PROCEDURE — 36410 VNPNXR 3YR/> PHY/QHP DX/THER: CPT

## 2021-02-06 PROCEDURE — 86225 DNA ANTIBODY NATIVE: CPT | Mod: 59

## 2021-02-06 PROCEDURE — 86682 HELMINTH ANTIBODY: CPT

## 2021-02-06 PROCEDURE — 76937 US GUIDE VASCULAR ACCESS: CPT

## 2021-02-06 PROCEDURE — 86255 FLUORESCENT ANTIBODY SCREEN: CPT | Mod: 91

## 2021-02-06 PROCEDURE — 81001 URINALYSIS AUTO W/SCOPE: CPT

## 2021-02-06 PROCEDURE — 86790 VIRUS ANTIBODY NOS: CPT

## 2021-02-06 PROCEDURE — 96372 THER/PROPH/DIAG INJ SC/IM: CPT

## 2021-02-06 PROCEDURE — 82595 ASSAY OF CRYOGLOBULIN: CPT

## 2021-02-06 PROCEDURE — 86235 NUCLEAR ANTIGEN ANTIBODY: CPT

## 2021-02-06 PROCEDURE — 86704 HEP B CORE ANTIBODY TOTAL: CPT

## 2021-02-06 RX ORDER — CEFEPIME HYDROCHLORIDE 1 G/50ML
2 INJECTION, SOLUTION INTRAVENOUS
Status: DISCONTINUED | OUTPATIENT
Start: 2021-02-06 | End: 2021-02-10

## 2021-02-06 RX ORDER — HYDROXYCHLOROQUINE SULFATE 200 MG/1
400 TABLET, FILM COATED ORAL DAILY
Status: DISCONTINUED | OUTPATIENT
Start: 2021-02-07 | End: 2021-02-11

## 2021-02-06 RX ORDER — INSULIN ASPART 100 [IU]/ML
1-10 INJECTION, SOLUTION INTRAVENOUS; SUBCUTANEOUS
Status: DISCONTINUED | OUTPATIENT
Start: 2021-02-06 | End: 2021-02-11 | Stop reason: HOSPADM

## 2021-02-06 RX ORDER — OXYCODONE AND ACETAMINOPHEN 5; 325 MG/1; MG/1
1 TABLET ORAL EVERY 4 HOURS PRN
Status: DISCONTINUED | OUTPATIENT
Start: 2021-02-06 | End: 2021-02-11 | Stop reason: HOSPADM

## 2021-02-06 RX ORDER — ACETAMINOPHEN 325 MG/1
650 TABLET ORAL EVERY 6 HOURS PRN
Status: DISCONTINUED | OUTPATIENT
Start: 2021-02-06 | End: 2021-02-11 | Stop reason: HOSPADM

## 2021-02-06 RX ORDER — CEFEPIME HYDROCHLORIDE 1 G/50ML
2 INJECTION, SOLUTION INTRAVENOUS
Status: DISCONTINUED | OUTPATIENT
Start: 2021-02-06 | End: 2021-02-06

## 2021-02-06 RX ORDER — ONDANSETRON 8 MG/1
8 TABLET, ORALLY DISINTEGRATING ORAL EVERY 8 HOURS PRN
Status: DISCONTINUED | OUTPATIENT
Start: 2021-02-06 | End: 2021-02-11 | Stop reason: HOSPADM

## 2021-02-06 RX ORDER — ONDANSETRON 2 MG/ML
4 INJECTION INTRAMUSCULAR; INTRAVENOUS EVERY 8 HOURS PRN
Status: DISCONTINUED | OUTPATIENT
Start: 2021-02-06 | End: 2021-02-11 | Stop reason: HOSPADM

## 2021-02-06 RX ORDER — IBUPROFEN 200 MG
16 TABLET ORAL
Status: DISCONTINUED | OUTPATIENT
Start: 2021-02-06 | End: 2021-02-11 | Stop reason: HOSPADM

## 2021-02-06 RX ORDER — GLUCAGON 1 MG
1 KIT INJECTION
Status: DISCONTINUED | OUTPATIENT
Start: 2021-02-06 | End: 2021-02-11 | Stop reason: HOSPADM

## 2021-02-06 RX ORDER — LOPERAMIDE HYDROCHLORIDE 2 MG/1
2 CAPSULE ORAL EVERY 4 HOURS PRN
Status: DISCONTINUED | OUTPATIENT
Start: 2021-02-06 | End: 2021-02-11 | Stop reason: HOSPADM

## 2021-02-06 RX ORDER — IBUPROFEN 200 MG
24 TABLET ORAL
Status: DISCONTINUED | OUTPATIENT
Start: 2021-02-06 | End: 2021-02-11 | Stop reason: HOSPADM

## 2021-02-06 RX ADMIN — CEFEPIME HYDROCHLORIDE 2 G: 2 INJECTION, SOLUTION INTRAVENOUS at 08:02

## 2021-02-06 RX ADMIN — INSULIN ASPART 2 UNITS: 100 INJECTION, SOLUTION INTRAVENOUS; SUBCUTANEOUS at 10:02

## 2021-02-06 RX ADMIN — DILTIAZEM HYDROCHLORIDE 360 MG: 60 TABLET, FILM COATED ORAL at 05:02

## 2021-02-06 RX ADMIN — CEFEPIME HYDROCHLORIDE 2 G: 2 INJECTION, SOLUTION INTRAVENOUS at 05:02

## 2021-02-06 RX ADMIN — Medication: at 04:02

## 2021-02-06 RX ADMIN — EPOPROSTENOL 0.2 NG/KG/MIN: 1.5 INJECTION, POWDER, LYOPHILIZED, FOR SOLUTION INTRAVENOUS at 04:02

## 2021-02-06 RX ADMIN — LEVOTHYROXINE SODIUM 50 MCG: 50 TABLET ORAL at 05:02

## 2021-02-06 RX ADMIN — VANCOMYCIN HYDROCHLORIDE 750 MG: 750 INJECTION, POWDER, LYOPHILIZED, FOR SOLUTION INTRAVENOUS at 05:02

## 2021-02-06 RX ADMIN — OXYCODONE HYDROCHLORIDE AND ACETAMINOPHEN 1 TABLET: 5; 325 TABLET ORAL at 08:02

## 2021-02-06 RX ADMIN — FOLIC ACID 1 MG: 1 TABLET ORAL at 08:02

## 2021-02-06 RX ADMIN — INSULIN ASPART 8 UNITS: 100 INJECTION, SOLUTION INTRAVENOUS; SUBCUTANEOUS at 02:02

## 2021-02-06 RX ADMIN — DEXTROSE 1000 MG: 5 SOLUTION INTRAVENOUS at 06:02

## 2021-02-06 RX ADMIN — ATOVAQUONE 1500 MG: 750 SUSPENSION ORAL at 08:02

## 2021-02-06 RX ADMIN — INSULIN ASPART 4 UNITS: 100 INJECTION, SOLUTION INTRAVENOUS; SUBCUTANEOUS at 08:02

## 2021-02-06 RX ADMIN — CEFEPIME HYDROCHLORIDE 2 G: 2 INJECTION, SOLUTION INTRAVENOUS at 02:02

## 2021-02-06 RX ADMIN — PANTOPRAZOLE SODIUM 40 MG: 40 TABLET, DELAYED RELEASE ORAL at 08:02

## 2021-02-07 PROBLEM — R73.9 HYPERGLYCEMIA: Status: ACTIVE | Noted: 2021-02-07

## 2021-02-07 LAB
POCT GLUCOSE: 180 MG/DL (ref 70–110)
POCT GLUCOSE: 183 MG/DL (ref 70–110)
POCT GLUCOSE: 194 MG/DL (ref 70–110)
POCT GLUCOSE: 228 MG/DL (ref 70–110)

## 2021-02-07 PROCEDURE — 96372 THER/PROPH/DIAG INJ SC/IM: CPT

## 2021-02-07 PROCEDURE — 63600175 PHARM REV CODE 636 W HCPCS: Performed by: STUDENT IN AN ORGANIZED HEALTH CARE EDUCATION/TRAINING PROGRAM

## 2021-02-07 PROCEDURE — 25000003 PHARM REV CODE 250: Performed by: STUDENT IN AN ORGANIZED HEALTH CARE EDUCATION/TRAINING PROGRAM

## 2021-02-07 PROCEDURE — 96376 TX/PRO/DX INJ SAME DRUG ADON: CPT

## 2021-02-07 PROCEDURE — 99222 1ST HOSP IP/OBS MODERATE 55: CPT | Mod: ,,, | Performed by: INTERNAL MEDICINE

## 2021-02-07 PROCEDURE — 99222 PR INITIAL HOSPITAL CARE,LEVL II: ICD-10-PCS | Mod: ,,, | Performed by: INTERNAL MEDICINE

## 2021-02-07 PROCEDURE — 63600175 PHARM REV CODE 636 W HCPCS: Performed by: GENERAL ACUTE CARE HOSPITAL

## 2021-02-07 PROCEDURE — G0378 HOSPITAL OBSERVATION PER HR: HCPCS

## 2021-02-07 PROCEDURE — 96375 TX/PRO/DX INJ NEW DRUG ADDON: CPT

## 2021-02-07 PROCEDURE — C9399 UNCLASSIFIED DRUGS OR BIOLOG: HCPCS | Performed by: STUDENT IN AN ORGANIZED HEALTH CARE EDUCATION/TRAINING PROGRAM

## 2021-02-07 PROCEDURE — 96366 THER/PROPH/DIAG IV INF ADDON: CPT

## 2021-02-07 RX ORDER — INSULIN ASPART 100 [IU]/ML
5 INJECTION, SOLUTION INTRAVENOUS; SUBCUTANEOUS
Status: DISCONTINUED | OUTPATIENT
Start: 2021-02-07 | End: 2021-02-09

## 2021-02-07 RX ORDER — DIPHENHYDRAMINE HYDROCHLORIDE 50 MG/ML
25 INJECTION INTRAMUSCULAR; INTRAVENOUS ONCE
Status: DISCONTINUED | OUTPATIENT
Start: 2021-02-07 | End: 2021-02-07

## 2021-02-07 RX ADMIN — FOLIC ACID 1 MG: 1 TABLET ORAL at 09:02

## 2021-02-07 RX ADMIN — INSULIN ASPART 2 UNITS: 100 INJECTION, SOLUTION INTRAVENOUS; SUBCUTANEOUS at 07:02

## 2021-02-07 RX ADMIN — INSULIN ASPART 4 UNITS: 100 INJECTION, SOLUTION INTRAVENOUS; SUBCUTANEOUS at 12:02

## 2021-02-07 RX ADMIN — CEFEPIME HYDROCHLORIDE 2 G: 2 INJECTION, SOLUTION INTRAVENOUS at 09:02

## 2021-02-07 RX ADMIN — CEFEPIME HYDROCHLORIDE 2 G: 2 INJECTION, SOLUTION INTRAVENOUS at 05:02

## 2021-02-07 RX ADMIN — LEVOTHYROXINE SODIUM 50 MCG: 50 TABLET ORAL at 06:02

## 2021-02-07 RX ADMIN — ONDANSETRON 4 MG: 2 INJECTION INTRAMUSCULAR; INTRAVENOUS at 09:02

## 2021-02-07 RX ADMIN — INSULIN DETEMIR 10 UNITS: 100 INJECTION, SOLUTION SUBCUTANEOUS at 09:02

## 2021-02-07 RX ADMIN — DILTIAZEM HYDROCHLORIDE 360 MG: 60 TABLET, FILM COATED ORAL at 05:02

## 2021-02-07 RX ADMIN — HYDROXYCHLOROQUINE SULFATE 400 MG: 200 TABLET, FILM COATED ORAL at 09:02

## 2021-02-07 RX ADMIN — DEXTROSE 1000 MG: 5 SOLUTION INTRAVENOUS at 09:02

## 2021-02-07 RX ADMIN — CEFEPIME HYDROCHLORIDE 2 G: 2 INJECTION, SOLUTION INTRAVENOUS at 01:02

## 2021-02-07 RX ADMIN — PANTOPRAZOLE SODIUM 40 MG: 40 TABLET, DELAYED RELEASE ORAL at 09:02

## 2021-02-07 RX ADMIN — INSULIN ASPART 5 UNITS: 100 INJECTION, SOLUTION INTRAVENOUS; SUBCUTANEOUS at 12:02

## 2021-02-07 RX ADMIN — OXYCODONE HYDROCHLORIDE AND ACETAMINOPHEN 1 TABLET: 5; 325 TABLET ORAL at 09:02

## 2021-02-07 RX ADMIN — ATOVAQUONE 1500 MG: 750 SUSPENSION ORAL at 09:02

## 2021-02-07 RX ADMIN — INSULIN ASPART 2 UNITS: 100 INJECTION, SOLUTION INTRAVENOUS; SUBCUTANEOUS at 04:02

## 2021-02-07 RX ADMIN — INSULIN ASPART 5 UNITS: 100 INJECTION, SOLUTION INTRAVENOUS; SUBCUTANEOUS at 04:02

## 2021-02-07 RX ADMIN — EPOPROSTENOL 2 NG/KG/MIN: 0.5 INJECTION, POWDER, LYOPHILIZED, FOR SOLUTION INTRAVENOUS at 09:02

## 2021-02-07 RX ADMIN — VANCOMYCIN HYDROCHLORIDE 750 MG: 750 INJECTION, POWDER, LYOPHILIZED, FOR SOLUTION INTRAVENOUS at 05:02

## 2021-02-07 RX ADMIN — INSULIN ASPART 1 UNITS: 100 INJECTION, SOLUTION INTRAVENOUS; SUBCUTANEOUS at 09:02

## 2021-02-07 RX ADMIN — VANCOMYCIN HYDROCHLORIDE 750 MG: 750 INJECTION, POWDER, LYOPHILIZED, FOR SOLUTION INTRAVENOUS at 07:02

## 2021-02-08 LAB
ALBUMIN SERPL BCP-MCNC: 2.7 G/DL (ref 3.5–5.2)
ALP SERPL-CCNC: 83 U/L (ref 55–135)
ALT SERPL W/O P-5'-P-CCNC: 25 U/L (ref 10–44)
ANA PATTERN 1: NORMAL
ANA SER QL IF: POSITIVE
ANA TITR SER IF: NORMAL {TITER}
ANION GAP SERPL CALC-SCNC: 10 MMOL/L (ref 8–16)
ANISOCYTOSIS BLD QL SMEAR: SLIGHT
AST SERPL-CCNC: 14 U/L (ref 10–40)
BASOPHILS # BLD AUTO: ABNORMAL K/UL (ref 0–0.2)
BASOPHILS NFR BLD: 0 % (ref 0–1.9)
BILIRUB SERPL-MCNC: 0.3 MG/DL (ref 0.1–1)
BUN SERPL-MCNC: 23 MG/DL (ref 6–20)
C3 SERPL-MCNC: 147 MG/DL (ref 50–180)
C4 SERPL-MCNC: 36 MG/DL (ref 11–44)
CALCIUM SERPL-MCNC: 8.7 MG/DL (ref 8.7–10.5)
CHLORIDE SERPL-SCNC: 103 MMOL/L (ref 95–110)
CO2 SERPL-SCNC: 24 MMOL/L (ref 23–29)
CREAT SERPL-MCNC: 0.8 MG/DL (ref 0.5–1.4)
DIFFERENTIAL METHOD: ABNORMAL
EOSINOPHIL # BLD AUTO: ABNORMAL K/UL (ref 0–0.5)
EOSINOPHIL NFR BLD: 0 % (ref 0–8)
ERYTHROCYTE [DISTWIDTH] IN BLOOD BY AUTOMATED COUNT: 16.7 % (ref 11.5–14.5)
EST. GFR  (AFRICAN AMERICAN): >60 ML/MIN/1.73 M^2
EST. GFR  (NON AFRICAN AMERICAN): >60 ML/MIN/1.73 M^2
GLUCOSE SERPL-MCNC: 198 MG/DL (ref 70–110)
HBV CORE AB SERPL QL IA: NEGATIVE
HBV SURFACE AB SER-ACNC: NEGATIVE M[IU]/ML
HBV SURFACE AG SERPL QL IA: NEGATIVE
HCT VFR BLD AUTO: 34.9 % (ref 37–48.5)
HCV AB SERPL QL IA: NEGATIVE
HEPATITIS A ANTIBODY, IGG: NEGATIVE
HGB BLD-MCNC: 11.3 G/DL (ref 12–16)
HIV 1+2 AB+HIV1 P24 AG SERPL QL IA: NEGATIVE
HYPOCHROMIA BLD QL SMEAR: ABNORMAL
IMM GRANULOCYTES # BLD AUTO: ABNORMAL K/UL (ref 0–0.04)
IMM GRANULOCYTES NFR BLD AUTO: ABNORMAL % (ref 0–0.5)
LEFT ANT TIBIAL SYS PSV: 68 CM/S
LEFT CFA PSV: 106 CM/S
LEFT DORSALIS PEDIS PSV: 82 CM/S
LEFT EXTERNAL ILIAC PSV: 82 CM/S
LEFT PERONEAL SYS PSV: 83 CM/S
LEFT POPLITEAL PSV: 65 CM/S
LEFT POST TIBIAL SYS PSV: 92 CM/S
LEFT PROFUNDA SYS PSV: 68 CM/S
LEFT SUPER FEMORAL DIST SYS PSV: 121 CM/S
LEFT SUPER FEMORAL MID SYS PSV: 125 CM/S
LEFT SUPER FEMORAL OSTIAL SYS PSV: 88 CM/S
LEFT SUPER FEMORAL PROX SYS PSV: 101 CM/S
LEFT TIB/PER TRUNK SYS PSV: 110 CM/S
LYMPHOCYTES # BLD AUTO: ABNORMAL K/UL (ref 1–4.8)
LYMPHOCYTES NFR BLD: 5 % (ref 18–48)
MCH RBC QN AUTO: 31.7 PG (ref 27–31)
MCHC RBC AUTO-ENTMCNC: 32.4 G/DL (ref 32–36)
MCV RBC AUTO: 98 FL (ref 82–98)
METAMYELOCYTES NFR BLD MANUAL: 3 %
MONOCYTES # BLD AUTO: ABNORMAL K/UL (ref 0.3–1)
MONOCYTES NFR BLD: 5 % (ref 4–15)
NEUTROPHILS NFR BLD: 86 % (ref 38–73)
NEUTS BAND NFR BLD MANUAL: 1 %
NRBC BLD-RTO: 0 /100 WBC
OHS CV LEFT LOWER EXTREMITY ABI (NO CALC): 1.49
OHS CV RIGHT ABI LOWER EXTREMITY (NO CALC): 1.42
OVALOCYTES BLD QL SMEAR: ABNORMAL
PLATELET # BLD AUTO: 399 K/UL (ref 150–350)
PLATELET BLD QL SMEAR: ABNORMAL
PMV BLD AUTO: 10.9 FL (ref 9.2–12.9)
POCT GLUCOSE: 176 MG/DL (ref 70–110)
POCT GLUCOSE: 179 MG/DL (ref 70–110)
POCT GLUCOSE: 247 MG/DL (ref 70–110)
POIKILOCYTOSIS BLD QL SMEAR: SLIGHT
POLYCHROMASIA BLD QL SMEAR: ABNORMAL
POTASSIUM SERPL-SCNC: 4.6 MMOL/L (ref 3.5–5.1)
PROT SERPL-MCNC: 6.4 G/DL (ref 6–8.4)
RBC # BLD AUTO: 3.57 M/UL (ref 4–5.4)
RIGHT ANT TIBIAL SYS PSV: 61 CM/S
RIGHT CFA PSV: 119 CM/S
RIGHT DORSALIS PEDIS PSV: 98 CM/S
RIGHT EXTERNAL ILLIAC PSV: 97 CM/S
RIGHT PERONEAL SYS PSV: 56 CM/S
RIGHT POPLITEAL PSV: 88 CM/S
RIGHT POST TIBIAL SYS PSV: 110 CM/S
RIGHT PROFUNDA SYS PSV: 90 CM/S
RIGHT SUPER FEMORAL DIST SYS PSV: 99 CM/S
RIGHT SUPER FEMORAL MID SYS PSV: 103 CM/S
RIGHT SUPER FEMORAL OSTIAL SYS PSV: 106 CM/S
RIGHT SUPER FEMORAL PROX SYS PSV: 124 CM/S
RIGHT TIB/PER TRUNK SYS PSV: 128 CM/S
RPR SER QL: NORMAL
SODIUM SERPL-SCNC: 137 MMOL/L (ref 136–145)
TARGETS BLD QL SMEAR: ABNORMAL
VANCOMYCIN TROUGH SERPL-MCNC: 10.3 UG/ML (ref 10–22)
WBC # BLD AUTO: 37.28 K/UL (ref 3.9–12.7)

## 2021-02-08 PROCEDURE — 63600175 PHARM REV CODE 636 W HCPCS: Performed by: NURSE PRACTITIONER

## 2021-02-08 PROCEDURE — 25000003 PHARM REV CODE 250: Performed by: NURSE PRACTITIONER

## 2021-02-08 PROCEDURE — 86162 COMPLEMENT TOTAL (CH50): CPT

## 2021-02-08 PROCEDURE — 63600175 PHARM REV CODE 636 W HCPCS: Performed by: STUDENT IN AN ORGANIZED HEALTH CARE EDUCATION/TRAINING PROGRAM

## 2021-02-08 PROCEDURE — 99232 SBSQ HOSP IP/OBS MODERATE 35: CPT | Mod: ,,, | Performed by: INTERNAL MEDICINE

## 2021-02-08 PROCEDURE — 86480 TB TEST CELL IMMUN MEASURE: CPT

## 2021-02-08 PROCEDURE — 85027 COMPLETE CBC AUTOMATED: CPT

## 2021-02-08 PROCEDURE — 25000003 PHARM REV CODE 250: Performed by: STUDENT IN AN ORGANIZED HEALTH CARE EDUCATION/TRAINING PROGRAM

## 2021-02-08 PROCEDURE — 20600001 HC STEP DOWN PRIVATE ROOM

## 2021-02-08 PROCEDURE — 99231 SBSQ HOSP IP/OBS SF/LOW 25: CPT | Mod: ,,, | Performed by: INTERNAL MEDICINE

## 2021-02-08 PROCEDURE — 86225 DNA ANTIBODY NATIVE: CPT

## 2021-02-08 PROCEDURE — 96372 THER/PROPH/DIAG INJ SC/IM: CPT

## 2021-02-08 PROCEDURE — 99233 PR SUBSEQUENT HOSPITAL CARE,LEVL III: ICD-10-PCS | Mod: GT,,, | Performed by: NURSE PRACTITIONER

## 2021-02-08 PROCEDURE — 94761 N-INVAS EAR/PLS OXIMETRY MLT: CPT

## 2021-02-08 PROCEDURE — 80053 COMPREHEN METABOLIC PANEL: CPT

## 2021-02-08 PROCEDURE — 25500020 PHARM REV CODE 255: Performed by: INTERNAL MEDICINE

## 2021-02-08 PROCEDURE — 86160 COMPLEMENT ANTIGEN: CPT

## 2021-02-08 PROCEDURE — 96376 TX/PRO/DX INJ SAME DRUG ADON: CPT

## 2021-02-08 PROCEDURE — 99233 SBSQ HOSP IP/OBS HIGH 50: CPT | Mod: GT,,, | Performed by: NURSE PRACTITIONER

## 2021-02-08 PROCEDURE — 99232 PR SUBSEQUENT HOSPITAL CARE,LEVL II: ICD-10-PCS | Mod: ,,, | Performed by: INTERNAL MEDICINE

## 2021-02-08 PROCEDURE — 85007 BL SMEAR W/DIFF WBC COUNT: CPT

## 2021-02-08 PROCEDURE — 80202 ASSAY OF VANCOMYCIN: CPT

## 2021-02-08 PROCEDURE — 86160 COMPLEMENT ANTIGEN: CPT | Mod: 59

## 2021-02-08 PROCEDURE — 99231 PR SUBSEQUENT HOSPITAL CARE,LEVL I: ICD-10-PCS | Mod: ,,, | Performed by: INTERNAL MEDICINE

## 2021-02-08 RX ORDER — VANCOMYCIN HCL IN 5 % DEXTROSE 1G/250ML
1000 PLASTIC BAG, INJECTION (ML) INTRAVENOUS
Status: DISCONTINUED | OUTPATIENT
Start: 2021-02-09 | End: 2021-02-10

## 2021-02-08 RX ORDER — TALC
9 POWDER (GRAM) TOPICAL NIGHTLY
Status: DISCONTINUED | OUTPATIENT
Start: 2021-02-08 | End: 2021-02-11 | Stop reason: HOSPADM

## 2021-02-08 RX ADMIN — INSULIN ASPART 5 UNITS: 100 INJECTION, SOLUTION INTRAVENOUS; SUBCUTANEOUS at 05:02

## 2021-02-08 RX ADMIN — HUMAN ALBUMIN MICROSPHERES AND PERFLUTREN 0.66 MG: 10; .22 INJECTION, SOLUTION INTRAVENOUS at 11:02

## 2021-02-08 RX ADMIN — PANTOPRAZOLE SODIUM 40 MG: 40 TABLET, DELAYED RELEASE ORAL at 08:02

## 2021-02-08 RX ADMIN — EPOPROSTENOL 2 NG/KG/MIN: 0.5 INJECTION, POWDER, LYOPHILIZED, FOR SOLUTION INTRAVENOUS at 12:02

## 2021-02-08 RX ADMIN — ATOVAQUONE 1500 MG: 750 SUSPENSION ORAL at 08:02

## 2021-02-08 RX ADMIN — MELATONIN TAB 3 MG 6 MG: 3 TAB at 09:02

## 2021-02-08 RX ADMIN — IOHEXOL 125 ML: 350 INJECTION, SOLUTION INTRAVENOUS at 05:02

## 2021-02-08 RX ADMIN — CEFEPIME HYDROCHLORIDE 2 G: 2 INJECTION, SOLUTION INTRAVENOUS at 07:02

## 2021-02-08 RX ADMIN — COLLAGENASE SANTYL: 250 OINTMENT TOPICAL at 03:02

## 2021-02-08 RX ADMIN — PREDNISONE 60 MG: 10 TABLET ORAL at 02:02

## 2021-02-08 RX ADMIN — ONDANSETRON 8 MG: 8 TABLET, ORALLY DISINTEGRATING ORAL at 12:02

## 2021-02-08 RX ADMIN — DILTIAZEM HYDROCHLORIDE 360 MG: 60 TABLET, FILM COATED ORAL at 07:02

## 2021-02-08 RX ADMIN — VANCOMYCIN HYDROCHLORIDE 750 MG: 750 INJECTION, POWDER, LYOPHILIZED, FOR SOLUTION INTRAVENOUS at 05:02

## 2021-02-08 RX ADMIN — FOLIC ACID 1 MG: 1 TABLET ORAL at 08:02

## 2021-02-08 RX ADMIN — INSULIN DETEMIR 10 UNITS: 100 INJECTION, SOLUTION SUBCUTANEOUS at 08:02

## 2021-02-08 RX ADMIN — CEFEPIME HYDROCHLORIDE 2 G: 2 INJECTION, SOLUTION INTRAVENOUS at 09:02

## 2021-02-08 RX ADMIN — INSULIN ASPART 5 UNITS: 100 INJECTION, SOLUTION INTRAVENOUS; SUBCUTANEOUS at 08:02

## 2021-02-08 RX ADMIN — INSULIN ASPART 2 UNITS: 100 INJECTION, SOLUTION INTRAVENOUS; SUBCUTANEOUS at 09:02

## 2021-02-08 RX ADMIN — LEVOTHYROXINE SODIUM 50 MCG: 50 TABLET ORAL at 05:02

## 2021-02-08 RX ADMIN — OXYCODONE HYDROCHLORIDE AND ACETAMINOPHEN 1 TABLET: 5; 325 TABLET ORAL at 12:02

## 2021-02-08 RX ADMIN — MELATONIN TAB 3 MG 1 MG: 3 TAB at 10:02

## 2021-02-08 RX ADMIN — CEFEPIME HYDROCHLORIDE 2 G: 2 INJECTION, SOLUTION INTRAVENOUS at 01:02

## 2021-02-08 RX ADMIN — INSULIN ASPART 4 UNITS: 100 INJECTION, SOLUTION INTRAVENOUS; SUBCUTANEOUS at 08:02

## 2021-02-08 RX ADMIN — OXYCODONE HYDROCHLORIDE AND ACETAMINOPHEN 1 TABLET: 5; 325 TABLET ORAL at 01:02

## 2021-02-08 RX ADMIN — HYDROXYCHLOROQUINE SULFATE 400 MG: 200 TABLET, FILM COATED ORAL at 08:02

## 2021-02-08 ASSESSMENT — SYSTEMIC LUPUS ERYTHEMATOSUS DISEASE ACTIVITY INDEX (SLEDAI): TOTAL_SCORE: 10

## 2021-02-09 LAB
ALBUMIN SERPL BCP-MCNC: 2.6 G/DL (ref 3.5–5.2)
ALP SERPL-CCNC: 91 U/L (ref 55–135)
ALT SERPL W/O P-5'-P-CCNC: 23 U/L (ref 10–44)
ANION GAP SERPL CALC-SCNC: 10 MMOL/L (ref 8–16)
ANISOCYTOSIS BLD QL SMEAR: SLIGHT
AST SERPL-CCNC: 12 U/L (ref 10–40)
BASOPHILS NFR BLD: 0 % (ref 0–1.9)
BILIRUB SERPL-MCNC: 0.3 MG/DL (ref 0.1–1)
BUN SERPL-MCNC: 20 MG/DL (ref 6–20)
CALCIUM SERPL-MCNC: 8.6 MG/DL (ref 8.7–10.5)
CHLORIDE SERPL-SCNC: 103 MMOL/L (ref 95–110)
CO2 SERPL-SCNC: 25 MMOL/L (ref 23–29)
CREAT SERPL-MCNC: 0.8 MG/DL (ref 0.5–1.4)
DIFFERENTIAL METHOD: ABNORMAL
EOSINOPHIL NFR BLD: 0 % (ref 0–8)
ERYTHROCYTE [DISTWIDTH] IN BLOOD BY AUTOMATED COUNT: 16.3 % (ref 11.5–14.5)
EST. GFR  (AFRICAN AMERICAN): >60 ML/MIN/1.73 M^2
EST. GFR  (NON AFRICAN AMERICAN): >60 ML/MIN/1.73 M^2
GAMMA INTERFERON BACKGROUND BLD IA-ACNC: 0.02 IU/ML
GLUCOSE SERPL-MCNC: 220 MG/DL (ref 70–110)
HCT VFR BLD AUTO: 34 % (ref 37–48.5)
HGB BLD-MCNC: 11 G/DL (ref 12–16)
IMM GRANULOCYTES # BLD AUTO: ABNORMAL K/UL (ref 0–0.04)
IMM GRANULOCYTES NFR BLD AUTO: ABNORMAL % (ref 0–0.5)
LYMPHOCYTES NFR BLD: 5 % (ref 18–48)
M TB IFN-G CD4+ BCKGRND COR BLD-ACNC: 0 IU/ML
MCH RBC QN AUTO: 31.3 PG (ref 27–31)
MCHC RBC AUTO-ENTMCNC: 32.4 G/DL (ref 32–36)
MCV RBC AUTO: 97 FL (ref 82–98)
MITOGEN IGNF BCKGRD COR BLD-ACNC: 0.02 IU/ML
MONOCYTES NFR BLD: 10 % (ref 4–15)
NEUTROPHILS NFR BLD: 85 % (ref 38–73)
NRBC BLD-RTO: 0 /100 WBC
PLATELET # BLD AUTO: 401 K/UL (ref 150–350)
PLATELET BLD QL SMEAR: ABNORMAL
PMV BLD AUTO: 10.8 FL (ref 9.2–12.9)
POCT GLUCOSE: 136 MG/DL (ref 70–110)
POCT GLUCOSE: 163 MG/DL (ref 70–110)
POCT GLUCOSE: 214 MG/DL (ref 70–110)
POCT GLUCOSE: 225 MG/DL (ref 70–110)
POCT GLUCOSE: 225 MG/DL (ref 70–110)
POTASSIUM SERPL-SCNC: 4.3 MMOL/L (ref 3.5–5.1)
PROT SERPL-MCNC: 6.1 G/DL (ref 6–8.4)
PROTEINASE3 IGG SER-ACNC: <0.2 U
RBC # BLD AUTO: 3.52 M/UL (ref 4–5.4)
SODIUM SERPL-SCNC: 138 MMOL/L (ref 136–145)
TB GOLD PLUS: ABNORMAL
TB2 - NIL: 0 IU/ML
WBC # BLD AUTO: 34.53 K/UL (ref 3.9–12.7)

## 2021-02-09 PROCEDURE — 99231 PR SUBSEQUENT HOSPITAL CARE,LEVL I: ICD-10-PCS | Mod: ,,, | Performed by: INTERNAL MEDICINE

## 2021-02-09 PROCEDURE — 63600175 PHARM REV CODE 636 W HCPCS: Performed by: STUDENT IN AN ORGANIZED HEALTH CARE EDUCATION/TRAINING PROGRAM

## 2021-02-09 PROCEDURE — 85613 RUSSELL VIPER VENOM DILUTED: CPT

## 2021-02-09 PROCEDURE — 93010 ELECTROCARDIOGRAM REPORT: CPT | Mod: ,,, | Performed by: INTERNAL MEDICINE

## 2021-02-09 PROCEDURE — 99233 PR SUBSEQUENT HOSPITAL CARE,LEVL III: ICD-10-PCS | Mod: GT,,, | Performed by: NURSE PRACTITIONER

## 2021-02-09 PROCEDURE — 25000003 PHARM REV CODE 250: Performed by: INTERNAL MEDICINE

## 2021-02-09 PROCEDURE — 93005 ELECTROCARDIOGRAM TRACING: CPT

## 2021-02-09 PROCEDURE — 85598 HEXAGNAL PHOSPH PLTLT NEUTRL: CPT

## 2021-02-09 PROCEDURE — 99233 SBSQ HOSP IP/OBS HIGH 50: CPT | Mod: ,,, | Performed by: PHYSICIAN ASSISTANT

## 2021-02-09 PROCEDURE — 93010 EKG 12-LEAD: ICD-10-PCS | Mod: ,,, | Performed by: INTERNAL MEDICINE

## 2021-02-09 PROCEDURE — 86225 DNA ANTIBODY NATIVE: CPT

## 2021-02-09 PROCEDURE — 99233 PR SUBSEQUENT HOSPITAL CARE,LEVL III: ICD-10-PCS | Mod: ,,, | Performed by: PHYSICIAN ASSISTANT

## 2021-02-09 PROCEDURE — 99231 SBSQ HOSP IP/OBS SF/LOW 25: CPT | Mod: ,,, | Performed by: INTERNAL MEDICINE

## 2021-02-09 PROCEDURE — 0034U TPMT NUDT15 GENES: CPT

## 2021-02-09 PROCEDURE — 99233 SBSQ HOSP IP/OBS HIGH 50: CPT | Mod: GT,,, | Performed by: NURSE PRACTITIONER

## 2021-02-09 PROCEDURE — 25000003 PHARM REV CODE 250: Performed by: STUDENT IN AN ORGANIZED HEALTH CARE EDUCATION/TRAINING PROGRAM

## 2021-02-09 PROCEDURE — 80053 COMPREHEN METABOLIC PANEL: CPT

## 2021-02-09 PROCEDURE — 63600175 PHARM REV CODE 636 W HCPCS: Performed by: NURSE PRACTITIONER

## 2021-02-09 PROCEDURE — 36415 COLL VENOUS BLD VENIPUNCTURE: CPT

## 2021-02-09 PROCEDURE — 85027 COMPLETE CBC AUTOMATED: CPT

## 2021-02-09 PROCEDURE — 86225 DNA ANTIBODY NATIVE: CPT | Mod: 59

## 2021-02-09 PROCEDURE — 20600001 HC STEP DOWN PRIVATE ROOM

## 2021-02-09 PROCEDURE — 85007 BL SMEAR W/DIFF WBC COUNT: CPT

## 2021-02-09 PROCEDURE — 63600175 PHARM REV CODE 636 W HCPCS: Performed by: GENERAL ACUTE CARE HOSPITAL

## 2021-02-09 PROCEDURE — 25000003 PHARM REV CODE 250: Performed by: NURSE PRACTITIONER

## 2021-02-09 PROCEDURE — 63600175 PHARM REV CODE 636 W HCPCS: Performed by: INTERNAL MEDICINE

## 2021-02-09 RX ORDER — ZOLPIDEM TARTRATE 5 MG/1
5 TABLET ORAL NIGHTLY
Status: DISCONTINUED | OUTPATIENT
Start: 2021-02-09 | End: 2021-02-11 | Stop reason: HOSPADM

## 2021-02-09 RX ORDER — INSULIN ASPART 100 [IU]/ML
6 INJECTION, SOLUTION INTRAVENOUS; SUBCUTANEOUS
Status: DISCONTINUED | OUTPATIENT
Start: 2021-02-09 | End: 2021-02-10

## 2021-02-09 RX ORDER — DILTIAZEM HYDROCHLORIDE 120 MG/1
120 CAPSULE, COATED, EXTENDED RELEASE ORAL DAILY
Status: DISCONTINUED | OUTPATIENT
Start: 2021-02-09 | End: 2021-02-09

## 2021-02-09 RX ORDER — SULFAMETHOXAZOLE AND TRIMETHOPRIM 800; 160 MG/1; MG/1
1 TABLET ORAL
Status: DISCONTINUED | OUTPATIENT
Start: 2021-02-10 | End: 2021-02-11 | Stop reason: HOSPADM

## 2021-02-09 RX ORDER — DILTIAZEM HYDROCHLORIDE 30 MG/1
120 TABLET, FILM COATED ORAL DAILY
Status: DISCONTINUED | OUTPATIENT
Start: 2021-02-09 | End: 2021-02-09

## 2021-02-09 RX ADMIN — OXYCODONE HYDROCHLORIDE AND ACETAMINOPHEN 1 TABLET: 5; 325 TABLET ORAL at 12:02

## 2021-02-09 RX ADMIN — INSULIN ASPART 5 UNITS: 100 INJECTION, SOLUTION INTRAVENOUS; SUBCUTANEOUS at 07:02

## 2021-02-09 RX ADMIN — MELATONIN TAB 3 MG 9 MG: 3 TAB at 08:02

## 2021-02-09 RX ADMIN — EPOPROSTENOL 2 NG/KG/MIN: 0.5 INJECTION, POWDER, LYOPHILIZED, FOR SOLUTION INTRAVENOUS at 02:02

## 2021-02-09 RX ADMIN — HYDROXYCHLOROQUINE SULFATE 400 MG: 200 TABLET, FILM COATED ORAL at 08:02

## 2021-02-09 RX ADMIN — DILTIAZEM HYDROCHLORIDE 120 MG: 120 CAPSULE, COATED, EXTENDED RELEASE ORAL at 06:02

## 2021-02-09 RX ADMIN — LEVOTHYROXINE SODIUM 50 MCG: 50 TABLET ORAL at 05:02

## 2021-02-09 RX ADMIN — INSULIN ASPART 4 UNITS: 100 INJECTION, SOLUTION INTRAVENOUS; SUBCUTANEOUS at 04:02

## 2021-02-09 RX ADMIN — CEFEPIME HYDROCHLORIDE 2 G: 2 INJECTION, SOLUTION INTRAVENOUS at 06:02

## 2021-02-09 RX ADMIN — ONDANSETRON 8 MG: 8 TABLET, ORALLY DISINTEGRATING ORAL at 12:02

## 2021-02-09 RX ADMIN — INSULIN ASPART 4 UNITS: 100 INJECTION, SOLUTION INTRAVENOUS; SUBCUTANEOUS at 07:02

## 2021-02-09 RX ADMIN — ZOLPIDEM TARTRATE 5 MG: 5 TABLET, COATED ORAL at 08:02

## 2021-02-09 RX ADMIN — CEFEPIME HYDROCHLORIDE 2 G: 2 INJECTION, SOLUTION INTRAVENOUS at 10:02

## 2021-02-09 RX ADMIN — FOLIC ACID 1 MG: 1 TABLET ORAL at 08:02

## 2021-02-09 RX ADMIN — PANTOPRAZOLE SODIUM 40 MG: 40 TABLET, DELAYED RELEASE ORAL at 08:02

## 2021-02-09 RX ADMIN — VANCOMYCIN HYDROCHLORIDE 1000 MG: 1 INJECTION, POWDER, LYOPHILIZED, FOR SOLUTION INTRAVENOUS at 04:02

## 2021-02-09 RX ADMIN — INSULIN ASPART 6 UNITS: 100 INJECTION, SOLUTION INTRAVENOUS; SUBCUTANEOUS at 04:02

## 2021-02-09 RX ADMIN — PREDNISONE 60 MG: 10 TABLET ORAL at 08:02

## 2021-02-09 RX ADMIN — ATOVAQUONE 1500 MG: 750 SUSPENSION ORAL at 08:02

## 2021-02-09 RX ADMIN — COLLAGENASE SANTYL: 250 OINTMENT TOPICAL at 09:02

## 2021-02-09 RX ADMIN — INSULIN DETEMIR 10 UNITS: 100 INJECTION, SOLUTION SUBCUTANEOUS at 09:02

## 2021-02-09 RX ADMIN — CEFEPIME HYDROCHLORIDE 2 G: 2 INJECTION, SOLUTION INTRAVENOUS at 02:02

## 2021-02-09 RX ADMIN — INSULIN ASPART 6 UNITS: 100 INJECTION, SOLUTION INTRAVENOUS; SUBCUTANEOUS at 12:02

## 2021-02-10 LAB
ALBUMIN SERPL BCP-MCNC: 2.5 G/DL (ref 3.5–5.2)
ALP SERPL-CCNC: 79 U/L (ref 55–135)
ALT SERPL W/O P-5'-P-CCNC: 28 U/L (ref 10–44)
ANCA AB TITR SER IF: NORMAL TITER
ANION GAP SERPL CALC-SCNC: 10 MMOL/L (ref 8–16)
ANISOCYTOSIS BLD QL SMEAR: SLIGHT
ANTI SM ANTIBODY: 0.07 RATIO (ref 0–0.99)
ANTI SM/RNP ANTIBODY: 0.86 RATIO (ref 0–0.99)
ANTI-SM INTERPRETATION: NEGATIVE
ANTI-SM/RNP INTERPRETATION: NEGATIVE
ANTI-SSA ANTIBODY: 0.06 RATIO (ref 0–0.99)
ANTI-SSA INTERPRETATION: NEGATIVE
ANTI-SSB ANTIBODY: 0.07 RATIO (ref 0–0.99)
ANTI-SSB INTERPRETATION: NEGATIVE
AST SERPL-CCNC: 20 U/L (ref 10–40)
BACTERIA BLD CULT: NORMAL
BACTERIA BLD CULT: NORMAL
BASOPHILS NFR BLD: 0 % (ref 0–1.9)
BILIRUB SERPL-MCNC: 0.4 MG/DL (ref 0.1–1)
BUN SERPL-MCNC: 20 MG/DL (ref 6–20)
CALCIUM SERPL-MCNC: 8.3 MG/DL (ref 8.7–10.5)
CARDIOLIPIN IGG SER IA-ACNC: <9.4 GPL (ref 0–14.99)
CARDIOLIPIN IGM SER IA-ACNC: <9.4 MPL (ref 0–12.49)
CH50 SERPL-ACNC: 64 U/ML (ref 42–95)
CHLORIDE SERPL-SCNC: 104 MMOL/L (ref 95–110)
CO2 SERPL-SCNC: 24 MMOL/L (ref 23–29)
CREAT SERPL-MCNC: 0.8 MG/DL (ref 0.5–1.4)
DIFFERENTIAL METHOD: ABNORMAL
DNA TITER: NORMAL
DSDNA AB SER-ACNC: POSITIVE [IU]/ML
ENA SCL70 AB SER-ACNC: 3 UNITS
EOSINOPHIL NFR BLD: 0 % (ref 0–8)
ERYTHROCYTE [DISTWIDTH] IN BLOOD BY AUTOMATED COUNT: 15.9 % (ref 11.5–14.5)
EST. GFR  (AFRICAN AMERICAN): >60 ML/MIN/1.73 M^2
EST. GFR  (NON AFRICAN AMERICAN): >60 ML/MIN/1.73 M^2
GLUCOSE SERPL-MCNC: 123 MG/DL (ref 70–110)
HCT VFR BLD AUTO: 35.2 % (ref 37–48.5)
HGB BLD-MCNC: 11.6 G/DL (ref 12–16)
HOWELL-JOLLY BOD BLD QL SMEAR: ABNORMAL
HYPOCHROMIA BLD QL SMEAR: ABNORMAL
IMM GRANULOCYTES # BLD AUTO: ABNORMAL K/UL (ref 0–0.04)
IMM GRANULOCYTES NFR BLD AUTO: ABNORMAL % (ref 0–0.5)
LYMPHOCYTES NFR BLD: 15 % (ref 18–48)
MCH RBC QN AUTO: 31.8 PG (ref 27–31)
MCHC RBC AUTO-ENTMCNC: 33 G/DL (ref 32–36)
MCV RBC AUTO: 96 FL (ref 82–98)
MONOCYTES NFR BLD: 9 % (ref 4–15)
MYELOCYTES NFR BLD MANUAL: 2 %
MYELOPEROXIDASE AB SER-ACNC: 5 UNITS
NEUTROPHILS NFR BLD: 73 % (ref 38–73)
NEUTS BAND NFR BLD MANUAL: 1 %
NRBC BLD-RTO: 0 /100 WBC
OVALOCYTES BLD QL SMEAR: ABNORMAL
P-ANCA TITR SER IF: NORMAL TITER
PLATELET # BLD AUTO: 417 K/UL (ref 150–350)
PLATELET BLD QL SMEAR: ABNORMAL
PMV BLD AUTO: 10.6 FL (ref 9.2–12.9)
POCT GLUCOSE: 180 MG/DL (ref 70–110)
POCT GLUCOSE: 197 MG/DL (ref 70–110)
POCT GLUCOSE: 272 MG/DL (ref 70–110)
POIKILOCYTOSIS BLD QL SMEAR: SLIGHT
POTASSIUM SERPL-SCNC: 3.6 MMOL/L (ref 3.5–5.1)
PROT SERPL-MCNC: 5.8 G/DL (ref 6–8.4)
RBC # BLD AUTO: 3.65 M/UL (ref 4–5.4)
SODIUM SERPL-SCNC: 138 MMOL/L (ref 136–145)
STRONGYLOIDES ANTIBODY IGG: NEGATIVE
WBC # BLD AUTO: 33.91 K/UL (ref 3.9–12.7)

## 2021-02-10 PROCEDURE — 85027 COMPLETE CBC AUTOMATED: CPT

## 2021-02-10 PROCEDURE — 63600175 PHARM REV CODE 636 W HCPCS: Performed by: STUDENT IN AN ORGANIZED HEALTH CARE EDUCATION/TRAINING PROGRAM

## 2021-02-10 PROCEDURE — 25000003 PHARM REV CODE 250

## 2021-02-10 PROCEDURE — 80053 COMPREHEN METABOLIC PANEL: CPT

## 2021-02-10 PROCEDURE — 36415 COLL VENOUS BLD VENIPUNCTURE: CPT

## 2021-02-10 PROCEDURE — 99233 SBSQ HOSP IP/OBS HIGH 50: CPT | Mod: GT,,, | Performed by: NURSE PRACTITIONER

## 2021-02-10 PROCEDURE — 99223 PR INITIAL HOSPITAL CARE,LEVL III: ICD-10-PCS | Mod: ,,, | Performed by: PODIATRIST

## 2021-02-10 PROCEDURE — 25000003 PHARM REV CODE 250: Performed by: STUDENT IN AN ORGANIZED HEALTH CARE EDUCATION/TRAINING PROGRAM

## 2021-02-10 PROCEDURE — 85007 BL SMEAR W/DIFF WBC COUNT: CPT

## 2021-02-10 PROCEDURE — 99233 PR SUBSEQUENT HOSPITAL CARE,LEVL III: ICD-10-PCS | Mod: GT,,, | Performed by: NURSE PRACTITIONER

## 2021-02-10 PROCEDURE — 99223 1ST HOSP IP/OBS HIGH 75: CPT | Mod: ,,, | Performed by: PODIATRIST

## 2021-02-10 PROCEDURE — 99231 PR SUBSEQUENT HOSPITAL CARE,LEVL I: ICD-10-PCS | Mod: ,,, | Performed by: INTERNAL MEDICINE

## 2021-02-10 PROCEDURE — 20600001 HC STEP DOWN PRIVATE ROOM

## 2021-02-10 PROCEDURE — 63600175 PHARM REV CODE 636 W HCPCS: Performed by: NURSE PRACTITIONER

## 2021-02-10 PROCEDURE — 25000003 PHARM REV CODE 250: Performed by: INTERNAL MEDICINE

## 2021-02-10 PROCEDURE — 63600175 PHARM REV CODE 636 W HCPCS: Performed by: INTERNAL MEDICINE

## 2021-02-10 PROCEDURE — 25000003 PHARM REV CODE 250: Performed by: NURSE PRACTITIONER

## 2021-02-10 PROCEDURE — 99231 SBSQ HOSP IP/OBS SF/LOW 25: CPT | Mod: ,,, | Performed by: INTERNAL MEDICINE

## 2021-02-10 RX ORDER — MYCOPHENOLATE MOFETIL 250 MG/1
500 CAPSULE ORAL 2 TIMES DAILY
Status: DISCONTINUED | OUTPATIENT
Start: 2021-02-10 | End: 2021-02-11 | Stop reason: HOSPADM

## 2021-02-10 RX ORDER — INSULIN ASPART 100 [IU]/ML
8 INJECTION, SOLUTION INTRAVENOUS; SUBCUTANEOUS
Status: DISCONTINUED | OUTPATIENT
Start: 2021-02-10 | End: 2021-02-11 | Stop reason: HOSPADM

## 2021-02-10 RX ORDER — AMLODIPINE BESYLATE 5 MG/1
10 TABLET ORAL DAILY
Status: DISCONTINUED | OUTPATIENT
Start: 2021-02-10 | End: 2021-02-11 | Stop reason: HOSPADM

## 2021-02-10 RX ORDER — INSULIN ASPART 100 [IU]/ML
8 INJECTION, SOLUTION INTRAVENOUS; SUBCUTANEOUS ONCE
Status: DISCONTINUED | OUTPATIENT
Start: 2021-02-10 | End: 2021-02-11 | Stop reason: HOSPADM

## 2021-02-10 RX ADMIN — INSULIN ASPART 8 UNITS: 100 INJECTION, SOLUTION INTRAVENOUS; SUBCUTANEOUS at 05:02

## 2021-02-10 RX ADMIN — CEFEPIME HYDROCHLORIDE 2 G: 2 INJECTION, SOLUTION INTRAVENOUS at 01:02

## 2021-02-10 RX ADMIN — COLLAGENASE SANTYL: 250 OINTMENT TOPICAL at 08:02

## 2021-02-10 RX ADMIN — PREDNISONE 60 MG: 10 TABLET ORAL at 08:02

## 2021-02-10 RX ADMIN — INSULIN ASPART 3 UNITS: 100 INJECTION, SOLUTION INTRAVENOUS; SUBCUTANEOUS at 08:02

## 2021-02-10 RX ADMIN — MYCOPHENOLATE MOFETIL 500 MG: 250 CAPSULE ORAL at 09:02

## 2021-02-10 RX ADMIN — HYDROXYCHLOROQUINE SULFATE 400 MG: 200 TABLET, FILM COATED ORAL at 08:02

## 2021-02-10 RX ADMIN — MYCOPHENOLATE MOFETIL 500 MG: 250 CAPSULE ORAL at 08:02

## 2021-02-10 RX ADMIN — PANTOPRAZOLE SODIUM 40 MG: 40 TABLET, DELAYED RELEASE ORAL at 08:02

## 2021-02-10 RX ADMIN — OXYCODONE HYDROCHLORIDE AND ACETAMINOPHEN 1 TABLET: 5; 325 TABLET ORAL at 12:02

## 2021-02-10 RX ADMIN — INSULIN ASPART 2 UNITS: 100 INJECTION, SOLUTION INTRAVENOUS; SUBCUTANEOUS at 12:02

## 2021-02-10 RX ADMIN — VANCOMYCIN HYDROCHLORIDE 1000 MG: 1 INJECTION, POWDER, LYOPHILIZED, FOR SOLUTION INTRAVENOUS at 04:02

## 2021-02-10 RX ADMIN — FOLIC ACID 1 MG: 1 TABLET ORAL at 08:02

## 2021-02-10 RX ADMIN — INSULIN ASPART 2 UNITS: 100 INJECTION, SOLUTION INTRAVENOUS; SUBCUTANEOUS at 05:02

## 2021-02-10 RX ADMIN — SULFAMETHOXAZOLE AND TRIMETHOPRIM 1 TABLET: 800; 160 TABLET ORAL at 05:02

## 2021-02-10 RX ADMIN — Medication: at 12:02

## 2021-02-10 RX ADMIN — INSULIN ASPART 8 UNITS: 100 INJECTION, SOLUTION INTRAVENOUS; SUBCUTANEOUS at 12:02

## 2021-02-10 RX ADMIN — MELATONIN TAB 3 MG 9 MG: 3 TAB at 08:02

## 2021-02-10 RX ADMIN — AMLODIPINE BESYLATE 10 MG: 5 TABLET ORAL at 08:02

## 2021-02-10 RX ADMIN — LEVOTHYROXINE SODIUM 50 MCG: 50 TABLET ORAL at 05:02

## 2021-02-10 RX ADMIN — ZOLPIDEM TARTRATE 5 MG: 5 TABLET, COATED ORAL at 08:02

## 2021-02-10 RX ADMIN — ONDANSETRON 8 MG: 8 TABLET, ORALLY DISINTEGRATING ORAL at 12:02

## 2021-02-11 ENCOUNTER — SPECIALTY PHARMACY (OUTPATIENT)
Dept: PHARMACY | Facility: CLINIC | Age: 56
End: 2021-02-11

## 2021-02-11 VITALS
BODY MASS INDEX: 20.07 KG/M2 | DIASTOLIC BLOOD PRESSURE: 94 MMHG | TEMPERATURE: 98 F | WEIGHT: 127.88 LBS | HEIGHT: 67 IN | HEART RATE: 93 BPM | OXYGEN SATURATION: 98 % | SYSTOLIC BLOOD PRESSURE: 154 MMHG | RESPIRATION RATE: 18 BRPM

## 2021-02-11 PROBLEM — T38.0X5A ADVERSE EFFECT OF ADRENAL CORTICAL STEROIDS: Status: ACTIVE | Noted: 2021-02-11

## 2021-02-11 LAB
ALBUMIN SERPL BCP-MCNC: 2.7 G/DL (ref 3.5–5.2)
ALP SERPL-CCNC: 90 U/L (ref 55–135)
ALT SERPL W/O P-5'-P-CCNC: 31 U/L (ref 10–44)
ANION GAP SERPL CALC-SCNC: 10 MMOL/L (ref 8–16)
ANISOCYTOSIS BLD QL SMEAR: SLIGHT
AST SERPL-CCNC: 19 U/L (ref 10–40)
B2 GLYCOPROT1 IGA SER QL: <9 SAU
B2 GLYCOPROT1 IGG SER QL: <9 SGU
B2 GLYCOPROT1 IGM SER QL: <9 SMU
BASOPHILS # BLD AUTO: ABNORMAL K/UL (ref 0–0.2)
BASOPHILS NFR BLD: 0 % (ref 0–1.9)
BILIRUB SERPL-MCNC: 0.5 MG/DL (ref 0.1–1)
BUN SERPL-MCNC: 15 MG/DL (ref 6–20)
CALCIUM SERPL-MCNC: 8.4 MG/DL (ref 8.7–10.5)
CHLORIDE SERPL-SCNC: 101 MMOL/L (ref 95–110)
CO2 SERPL-SCNC: 27 MMOL/L (ref 23–29)
CREAT SERPL-MCNC: 0.8 MG/DL (ref 0.5–1.4)
DIFFERENTIAL METHOD: ABNORMAL
DNA TITER: NORMAL
DSDNA AB SER-ACNC: POSITIVE [IU]/ML
EOSINOPHIL # BLD AUTO: ABNORMAL K/UL (ref 0–0.5)
EOSINOPHIL NFR BLD: 0 % (ref 0–8)
ERYTHROCYTE [DISTWIDTH] IN BLOOD BY AUTOMATED COUNT: 16 % (ref 11.5–14.5)
EST. GFR  (AFRICAN AMERICAN): >60 ML/MIN/1.73 M^2
EST. GFR  (NON AFRICAN AMERICAN): >60 ML/MIN/1.73 M^2
GLUCOSE SERPL-MCNC: 105 MG/DL (ref 70–110)
HCT VFR BLD AUTO: 38 % (ref 37–48.5)
HGB BLD-MCNC: 12.4 G/DL (ref 12–16)
HYPOCHROMIA BLD QL SMEAR: ABNORMAL
IMM GRANULOCYTES # BLD AUTO: ABNORMAL K/UL (ref 0–0.04)
IMM GRANULOCYTES NFR BLD AUTO: ABNORMAL % (ref 0–0.5)
LYMPHOCYTES # BLD AUTO: ABNORMAL K/UL (ref 1–4.8)
LYMPHOCYTES NFR BLD: 15 % (ref 18–48)
MCH RBC QN AUTO: 31.6 PG (ref 27–31)
MCHC RBC AUTO-ENTMCNC: 32.6 G/DL (ref 32–36)
MCV RBC AUTO: 97 FL (ref 82–98)
METAMYELOCYTES NFR BLD MANUAL: 4 %
MONOCYTES # BLD AUTO: ABNORMAL K/UL (ref 0.3–1)
MONOCYTES NFR BLD: 11 % (ref 4–15)
MYELOCYTES NFR BLD MANUAL: 4 %
NEUTROPHILS NFR BLD: 64 % (ref 38–73)
NEUTS BAND NFR BLD MANUAL: 1 %
NRBC BLD-RTO: 1 /100 WBC
PLATELET # BLD AUTO: 484 K/UL (ref 150–350)
PLATELET BLD QL SMEAR: ABNORMAL
PM1 AB SER QL: NORMAL
PMV BLD AUTO: 10.7 FL (ref 9.2–12.9)
POCT GLUCOSE: 116 MG/DL (ref 70–110)
POCT GLUCOSE: 93 MG/DL (ref 70–110)
POTASSIUM SERPL-SCNC: 3.6 MMOL/L (ref 3.5–5.1)
PROMYELOCYTES NFR BLD MANUAL: 1 %
PROT SERPL-MCNC: 6.3 G/DL (ref 6–8.4)
RBC # BLD AUTO: 3.92 M/UL (ref 4–5.4)
SODIUM SERPL-SCNC: 138 MMOL/L (ref 136–145)
TARGETS BLD QL SMEAR: ABNORMAL
WBC # BLD AUTO: 34.4 K/UL (ref 3.9–12.7)

## 2021-02-11 PROCEDURE — 25000003 PHARM REV CODE 250: Performed by: STUDENT IN AN ORGANIZED HEALTH CARE EDUCATION/TRAINING PROGRAM

## 2021-02-11 PROCEDURE — 90686 IIV4 VACC NO PRSV 0.5 ML IM: CPT | Performed by: INTERNAL MEDICINE

## 2021-02-11 PROCEDURE — 90471 IMMUNIZATION ADMIN: CPT | Performed by: INTERNAL MEDICINE

## 2021-02-11 PROCEDURE — 99232 PR SUBSEQUENT HOSPITAL CARE,LEVL II: ICD-10-PCS | Mod: ,,, | Performed by: NURSE PRACTITIONER

## 2021-02-11 PROCEDURE — 85027 COMPLETE CBC AUTOMATED: CPT

## 2021-02-11 PROCEDURE — 25000003 PHARM REV CODE 250: Performed by: NURSE PRACTITIONER

## 2021-02-11 PROCEDURE — 99232 SBSQ HOSP IP/OBS MODERATE 35: CPT | Mod: ,,, | Performed by: NURSE PRACTITIONER

## 2021-02-11 PROCEDURE — 63600175 PHARM REV CODE 636 W HCPCS: Performed by: NURSE PRACTITIONER

## 2021-02-11 PROCEDURE — 99233 SBSQ HOSP IP/OBS HIGH 50: CPT | Mod: GT,,, | Performed by: NURSE PRACTITIONER

## 2021-02-11 PROCEDURE — 99233 PR SUBSEQUENT HOSPITAL CARE,LEVL III: ICD-10-PCS | Mod: GT,,, | Performed by: NURSE PRACTITIONER

## 2021-02-11 PROCEDURE — 63600175 PHARM REV CODE 636 W HCPCS: Performed by: INTERNAL MEDICINE

## 2021-02-11 PROCEDURE — 85007 BL SMEAR W/DIFF WBC COUNT: CPT

## 2021-02-11 PROCEDURE — 36415 COLL VENOUS BLD VENIPUNCTURE: CPT

## 2021-02-11 PROCEDURE — 80053 COMPREHEN METABOLIC PANEL: CPT

## 2021-02-11 RX ORDER — SILDENAFIL CITRATE 20 MG/1
20 TABLET ORAL 3 TIMES DAILY
Qty: 90 TABLET | Refills: 0 | Status: SHIPPED | OUTPATIENT
Start: 2021-02-11 | End: 2021-03-11 | Stop reason: SDUPTHER

## 2021-02-11 RX ORDER — INSULIN ASPART 100 [IU]/ML
10 INJECTION, SOLUTION INTRAVENOUS; SUBCUTANEOUS
Qty: 15 ML | Refills: 11 | Status: SHIPPED | OUTPATIENT
Start: 2021-02-11 | End: 2021-03-22

## 2021-02-11 RX ORDER — INSULIN DETEMIR 100 [IU]/ML
12 INJECTION, SOLUTION SUBCUTANEOUS DAILY
Qty: 3 ML | Refills: 11 | Status: SHIPPED | OUTPATIENT
Start: 2021-02-11 | End: 2021-03-22

## 2021-02-11 RX ORDER — HYDROXYCHLOROQUINE SULFATE 200 MG/1
200 TABLET, FILM COATED ORAL 2 TIMES DAILY
Status: DISCONTINUED | OUTPATIENT
Start: 2021-02-11 | End: 2021-02-11 | Stop reason: HOSPADM

## 2021-02-11 RX ORDER — LANCETS 33 GAUGE
EACH MISCELLANEOUS
Qty: 100 EACH | Refills: 3 | Status: SHIPPED | OUTPATIENT
Start: 2021-02-11 | End: 2022-11-10

## 2021-02-11 RX ORDER — DEXTROSE 4 G
TABLET,CHEWABLE ORAL
Qty: 1 EACH | Refills: 1 | Status: SHIPPED | OUTPATIENT
Start: 2021-02-11 | End: 2022-11-10

## 2021-02-11 RX ORDER — PANTOPRAZOLE SODIUM 40 MG/1
40 TABLET, DELAYED RELEASE ORAL DAILY
Qty: 30 TABLET | Refills: 11 | Status: SHIPPED | OUTPATIENT
Start: 2021-02-11 | End: 2022-11-10

## 2021-02-11 RX ORDER — HYDROXYCHLOROQUINE SULFATE 200 MG/1
200 TABLET, FILM COATED ORAL 2 TIMES DAILY
Qty: 180 TABLET | Refills: 3 | Status: SHIPPED | OUTPATIENT
Start: 2021-02-11 | End: 2021-09-13 | Stop reason: SDUPTHER

## 2021-02-11 RX ORDER — CALCIUM CARBONATE 200(500)MG
500 TABLET,CHEWABLE ORAL DAILY PRN
Status: DISCONTINUED | OUTPATIENT
Start: 2021-02-11 | End: 2021-02-11 | Stop reason: HOSPADM

## 2021-02-11 RX ORDER — SULFAMETHOXAZOLE AND TRIMETHOPRIM 800; 160 MG/1; MG/1
1 TABLET ORAL
Qty: 36 TABLET | Refills: 3 | Status: SHIPPED | OUTPATIENT
Start: 2021-02-12 | End: 2022-11-10

## 2021-02-11 RX ORDER — PEN NEEDLE, DIABETIC 30 GX3/16"
NEEDLE, DISPOSABLE MISCELLANEOUS
Qty: 100 EACH | Refills: 3 | Status: SHIPPED | OUTPATIENT
Start: 2021-02-11 | End: 2022-11-10

## 2021-02-11 RX ORDER — MYCOPHENOLATE MOFETIL 250 MG/1
500 CAPSULE ORAL 2 TIMES DAILY
Qty: 120 CAPSULE | Refills: 11 | Status: SHIPPED | OUTPATIENT
Start: 2021-02-11 | End: 2022-02-11

## 2021-02-11 RX ADMIN — LEVOTHYROXINE SODIUM 50 MCG: 50 TABLET ORAL at 06:02

## 2021-02-11 RX ADMIN — MYCOPHENOLATE MOFETIL 500 MG: 250 CAPSULE ORAL at 10:02

## 2021-02-11 RX ADMIN — HYDROXYCHLOROQUINE SULFATE 200 MG: 200 TABLET ORAL at 10:02

## 2021-02-11 RX ADMIN — PANTOPRAZOLE SODIUM 40 MG: 40 TABLET, DELAYED RELEASE ORAL at 10:02

## 2021-02-11 RX ADMIN — INSULIN ASPART 8 UNITS: 100 INJECTION, SOLUTION INTRAVENOUS; SUBCUTANEOUS at 09:02

## 2021-02-11 RX ADMIN — INSULIN ASPART 8 UNITS: 100 INJECTION, SOLUTION INTRAVENOUS; SUBCUTANEOUS at 01:02

## 2021-02-11 RX ADMIN — AMLODIPINE BESYLATE 10 MG: 5 TABLET ORAL at 10:02

## 2021-02-11 RX ADMIN — PREDNISONE 60 MG: 10 TABLET ORAL at 10:02

## 2021-02-11 RX ADMIN — FOLIC ACID 1 MG: 1 TABLET ORAL at 10:02

## 2021-02-11 RX ADMIN — INFLUENZA VIRUS VACCINE 0.5 ML: 15; 15; 15; 15 SUSPENSION INTRAMUSCULAR at 02:02

## 2021-02-12 ENCOUNTER — PATIENT MESSAGE (OUTPATIENT)
Dept: ENDOCRINOLOGY | Facility: CLINIC | Age: 56
End: 2021-02-12

## 2021-02-12 ENCOUNTER — PATIENT MESSAGE (OUTPATIENT)
Dept: RHEUMATOLOGY | Facility: CLINIC | Age: 56
End: 2021-02-12

## 2021-02-12 ENCOUNTER — TELEPHONE (OUTPATIENT)
Dept: ENDOCRINOLOGY | Facility: CLINIC | Age: 56
End: 2021-02-12

## 2021-02-12 ENCOUNTER — PATIENT OUTREACH (OUTPATIENT)
Dept: ADMINISTRATIVE | Facility: CLINIC | Age: 56
End: 2021-02-12

## 2021-02-13 ENCOUNTER — PATIENT MESSAGE (OUTPATIENT)
Dept: RHEUMATOLOGY | Facility: CLINIC | Age: 56
End: 2021-02-13

## 2021-02-15 ENCOUNTER — TELEPHONE (OUTPATIENT)
Dept: RHEUMATOLOGY | Facility: CLINIC | Age: 56
End: 2021-02-15

## 2021-02-15 ENCOUNTER — PATIENT MESSAGE (OUTPATIENT)
Dept: RHEUMATOLOGY | Facility: CLINIC | Age: 56
End: 2021-02-15

## 2021-02-15 LAB
APTT HEX PL PPP: NEGATIVE S
NUDT15 GENOTYPE: NORMAL
NUDT15 PHENOTYPE: NORMAL
TPMT ADDITIONAL INFORMATION: NORMAL
TPMT DISCLAIMER: NORMAL
TPMT GENOTYPE RESULT: NORMAL
TPMT INTERPRETATION: NORMAL
TPMT METHOD: NORMAL
TPMT PHENOTYPE: NORMAL
TPMT REVIEWED BY: NORMAL

## 2021-02-17 ENCOUNTER — TELEPHONE (OUTPATIENT)
Dept: INTERNAL MEDICINE | Facility: CLINIC | Age: 56
End: 2021-02-17

## 2021-02-17 LAB
CRYOGLOB SER QL: NORMAL
LA PPP-IMP: NEGATIVE

## 2021-02-18 LAB — RNAP III AB SER-ACNC: <20 UNITS

## 2021-02-19 ENCOUNTER — OFFICE VISIT (OUTPATIENT)
Dept: RHEUMATOLOGY | Facility: CLINIC | Age: 56
End: 2021-02-19
Payer: COMMERCIAL

## 2021-02-19 ENCOUNTER — PATIENT MESSAGE (OUTPATIENT)
Dept: ENDOCRINOLOGY | Facility: CLINIC | Age: 56
End: 2021-02-19

## 2021-02-19 ENCOUNTER — OFFICE VISIT (OUTPATIENT)
Dept: ENDOCRINOLOGY | Facility: CLINIC | Age: 56
End: 2021-02-19
Payer: COMMERCIAL

## 2021-02-19 DIAGNOSIS — M32.9 SYSTEMIC LUPUS ERYTHEMATOSUS, UNSPECIFIED SLE TYPE, UNSPECIFIED ORGAN INVOLVEMENT STATUS: Primary | ICD-10-CM

## 2021-02-19 DIAGNOSIS — E03.9 HYPOTHYROIDISM, UNSPECIFIED TYPE: ICD-10-CM

## 2021-02-19 DIAGNOSIS — I10 HTN (HYPERTENSION), BENIGN: ICD-10-CM

## 2021-02-19 DIAGNOSIS — Z79.899 ENCOUNTER FOR LONG TERM USE OF MYCOPHENOLATE MOFETIL: ICD-10-CM

## 2021-02-19 DIAGNOSIS — M32.19 OTHER SYSTEMIC LUPUS ERYTHEMATOSUS WITH OTHER ORGAN INVOLVEMENT: ICD-10-CM

## 2021-02-19 DIAGNOSIS — R73.9 HYPERGLYCEMIA: Primary | ICD-10-CM

## 2021-02-19 DIAGNOSIS — I73.01 RAYNAUD'S DISEASE WITH GANGRENE: ICD-10-CM

## 2021-02-19 PROCEDURE — 1125F AMNT PAIN NOTED PAIN PRSNT: CPT | Mod: ,,, | Performed by: INTERNAL MEDICINE

## 2021-02-19 PROCEDURE — 99214 PR OFFICE/OUTPT VISIT, EST, LEVL IV, 30-39 MIN: ICD-10-PCS | Mod: 95,,, | Performed by: NURSE PRACTITIONER

## 2021-02-19 PROCEDURE — 99215 PR OFFICE/OUTPT VISIT, EST, LEVL V, 40-54 MIN: ICD-10-PCS | Mod: 95,,, | Performed by: INTERNAL MEDICINE

## 2021-02-19 PROCEDURE — 99215 OFFICE O/P EST HI 40 MIN: CPT | Mod: 95,,, | Performed by: INTERNAL MEDICINE

## 2021-02-19 PROCEDURE — 1125F PR PAIN SEVERITY QUANTIFIED, PAIN PRESENT: ICD-10-PCS | Mod: ,,, | Performed by: INTERNAL MEDICINE

## 2021-02-19 PROCEDURE — 99214 OFFICE O/P EST MOD 30 MIN: CPT | Mod: 95,,, | Performed by: NURSE PRACTITIONER

## 2021-02-19 ASSESSMENT — ROUTINE ASSESSMENT OF PATIENT INDEX DATA (RAPID3)
TOTAL RAPID3 SCORE: 3.78
PSYCHOLOGICAL DISTRESS SCORE: 5.5
AM STIFFNESS SCORE: 0, NO
PAIN SCORE: 4
MDHAQ FUNCTION SCORE: 1.3
PATIENT GLOBAL ASSESSMENT SCORE: 3
FATIGUE SCORE: 9

## 2021-02-20 ENCOUNTER — PATIENT MESSAGE (OUTPATIENT)
Dept: RHEUMATOLOGY | Facility: CLINIC | Age: 56
End: 2021-02-20

## 2021-02-21 LAB
ANTI-PM/SCL AB: <20 UNITS
ANTI-SS-A 52 KD AB, IGG: <20 UNITS
EJ AB SER QL: NEGATIVE
ENA JO1 AB SER IA-ACNC: <20 UNITS
ENA SM+RNP AB SER IA-ACNC: 33 UNITS
FIBRILLARIN (U3 RNP): NEGATIVE
KU AB SER QL: NEGATIVE
MDA-5 (P140): <20 UNITS
MI2 AB SER QL: NEGATIVE
NXP-2 (P140): <20 UNITS
OJ AB SER QL: NEGATIVE
PL12 AB SER QL: NEGATIVE
PL7 AB SER QL: NEGATIVE
SRP AB SERPL QL: NEGATIVE
TH/TO: NEGATIVE
TIF1 GAMMA (P155/140): <20 UNITS
U2 SNRNP: NEGATIVE

## 2021-02-22 ENCOUNTER — PATIENT MESSAGE (OUTPATIENT)
Dept: RHEUMATOLOGY | Facility: CLINIC | Age: 56
End: 2021-02-22

## 2021-02-23 ENCOUNTER — TELEPHONE (OUTPATIENT)
Dept: RHEUMATOLOGY | Facility: CLINIC | Age: 56
End: 2021-02-23

## 2021-02-23 ENCOUNTER — PATIENT MESSAGE (OUTPATIENT)
Dept: RHEUMATOLOGY | Facility: CLINIC | Age: 56
End: 2021-02-23

## 2021-02-24 ENCOUNTER — PATIENT MESSAGE (OUTPATIENT)
Dept: RHEUMATOLOGY | Facility: CLINIC | Age: 56
End: 2021-02-24

## 2021-02-26 ENCOUNTER — PATIENT MESSAGE (OUTPATIENT)
Dept: ENDOCRINOLOGY | Facility: CLINIC | Age: 56
End: 2021-02-26

## 2021-03-01 RX ORDER — OXYCODONE AND ACETAMINOPHEN 10; 325 MG/1; MG/1
1 TABLET ORAL
Qty: 90 TABLET | Refills: 0 | Status: SHIPPED | OUTPATIENT
Start: 2021-03-01 | End: 2022-11-10

## 2021-03-05 ENCOUNTER — PATIENT MESSAGE (OUTPATIENT)
Dept: ENDOCRINOLOGY | Facility: CLINIC | Age: 56
End: 2021-03-05

## 2021-03-11 ENCOUNTER — OFFICE VISIT (OUTPATIENT)
Dept: RHEUMATOLOGY | Facility: CLINIC | Age: 56
End: 2021-03-11
Payer: COMMERCIAL

## 2021-03-11 DIAGNOSIS — G89.29 CHRONIC PAIN OF BOTH FEET: ICD-10-CM

## 2021-03-11 DIAGNOSIS — M79.671 CHRONIC PAIN OF BOTH FEET: ICD-10-CM

## 2021-03-11 DIAGNOSIS — I73.01 RAYNAUD'S DISEASE WITH GANGRENE: ICD-10-CM

## 2021-03-11 DIAGNOSIS — I73.01 RAYNAUD'S DISEASE WITH GANGRENE: Primary | ICD-10-CM

## 2021-03-11 DIAGNOSIS — M32.9 SYSTEMIC LUPUS ERYTHEMATOSUS, UNSPECIFIED SLE TYPE, UNSPECIFIED ORGAN INVOLVEMENT STATUS: Primary | ICD-10-CM

## 2021-03-11 DIAGNOSIS — M79.672 CHRONIC PAIN OF BOTH FEET: ICD-10-CM

## 2021-03-11 DIAGNOSIS — Z79.899 ENCOUNTER FOR LONG TERM USE OF MYCOPHENOLATE MOFETIL: ICD-10-CM

## 2021-03-11 PROCEDURE — 1125F AMNT PAIN NOTED PAIN PRSNT: CPT | Mod: ,,, | Performed by: INTERNAL MEDICINE

## 2021-03-11 PROCEDURE — 99215 OFFICE O/P EST HI 40 MIN: CPT | Mod: 95,,, | Performed by: INTERNAL MEDICINE

## 2021-03-11 PROCEDURE — 1125F PR PAIN SEVERITY QUANTIFIED, PAIN PRESENT: ICD-10-PCS | Mod: ,,, | Performed by: INTERNAL MEDICINE

## 2021-03-11 PROCEDURE — 99215 PR OFFICE/OUTPT VISIT, EST, LEVL V, 40-54 MIN: ICD-10-PCS | Mod: 95,,, | Performed by: INTERNAL MEDICINE

## 2021-03-11 RX ORDER — SILDENAFIL CITRATE 20 MG/1
20 TABLET ORAL 3 TIMES DAILY
Qty: 90 TABLET | Refills: 4 | Status: SHIPPED | OUTPATIENT
Start: 2021-03-11 | End: 2022-01-20 | Stop reason: ALTCHOICE

## 2021-03-11 RX ORDER — PREDNISONE 5 MG/1
5 TABLET ORAL DAILY
Qty: 90 TABLET | Refills: 1 | Status: SHIPPED | OUTPATIENT
Start: 2021-03-11 | End: 2022-11-10

## 2021-03-11 RX ORDER — MYCOPHENOLATE MOFETIL 500 MG/1
1500 TABLET ORAL 2 TIMES DAILY
Qty: 180 TABLET | Refills: 1 | Status: SHIPPED | OUTPATIENT
Start: 2021-03-11 | End: 2021-06-02 | Stop reason: SDUPTHER

## 2021-03-11 RX ORDER — SILDENAFIL CITRATE 20 MG/1
20 TABLET ORAL 3 TIMES DAILY
Qty: 90 TABLET | Refills: 11 | Status: SHIPPED | OUTPATIENT
Start: 2021-03-11 | End: 2022-01-20 | Stop reason: SDUPTHER

## 2021-03-11 ASSESSMENT — ROUTINE ASSESSMENT OF PATIENT INDEX DATA (RAPID3)
TOTAL RAPID3 SCORE: 5.05
PAIN SCORE: 7
AM STIFFNESS SCORE: 0, NO
PSYCHOLOGICAL DISTRESS SCORE: 3.3
PATIENT GLOBAL ASSESSMENT SCORE: 4.5
MDHAQ FUNCTION SCORE: 1.1
FATIGUE SCORE: 2

## 2021-03-12 ENCOUNTER — PATIENT MESSAGE (OUTPATIENT)
Dept: ENDOCRINOLOGY | Facility: CLINIC | Age: 56
End: 2021-03-12

## 2021-03-12 ENCOUNTER — PATIENT MESSAGE (OUTPATIENT)
Dept: RHEUMATOLOGY | Facility: CLINIC | Age: 56
End: 2021-03-12

## 2021-03-16 ENCOUNTER — TELEPHONE (OUTPATIENT)
Dept: RHEUMATOLOGY | Facility: CLINIC | Age: 56
End: 2021-03-16

## 2021-03-16 ENCOUNTER — PATIENT MESSAGE (OUTPATIENT)
Dept: RHEUMATOLOGY | Facility: CLINIC | Age: 56
End: 2021-03-16

## 2021-03-22 ENCOUNTER — PATIENT MESSAGE (OUTPATIENT)
Dept: ENDOCRINOLOGY | Facility: CLINIC | Age: 56
End: 2021-03-22

## 2021-03-22 ENCOUNTER — PATIENT MESSAGE (OUTPATIENT)
Dept: RHEUMATOLOGY | Facility: CLINIC | Age: 56
End: 2021-03-22

## 2021-03-29 ENCOUNTER — SPECIALTY PHARMACY (OUTPATIENT)
Dept: PHARMACY | Facility: CLINIC | Age: 56
End: 2021-03-29

## 2021-04-01 ENCOUNTER — PATIENT MESSAGE (OUTPATIENT)
Dept: RHEUMATOLOGY | Facility: CLINIC | Age: 56
End: 2021-04-01

## 2021-04-12 ENCOUNTER — TELEPHONE (OUTPATIENT)
Dept: RHEUMATOLOGY | Facility: CLINIC | Age: 56
End: 2021-04-12

## 2021-04-13 ENCOUNTER — PATIENT MESSAGE (OUTPATIENT)
Dept: RHEUMATOLOGY | Facility: CLINIC | Age: 56
End: 2021-04-13

## 2021-04-13 ENCOUNTER — OFFICE VISIT (OUTPATIENT)
Dept: RHEUMATOLOGY | Facility: CLINIC | Age: 56
End: 2021-04-13
Payer: COMMERCIAL

## 2021-04-13 DIAGNOSIS — M32.9 SYSTEMIC LUPUS ERYTHEMATOSUS, UNSPECIFIED SLE TYPE, UNSPECIFIED ORGAN INVOLVEMENT STATUS: Primary | ICD-10-CM

## 2021-04-13 DIAGNOSIS — I73.01 RAYNAUD'S DISEASE WITH GANGRENE: ICD-10-CM

## 2021-04-13 DIAGNOSIS — I96 GANGRENE OF BOTH FEET: ICD-10-CM

## 2021-04-13 DIAGNOSIS — Z79.899 ENCOUNTER FOR LONG TERM USE OF MYCOPHENOLATE MOFETIL: ICD-10-CM

## 2021-04-13 PROCEDURE — 99214 OFFICE O/P EST MOD 30 MIN: CPT | Mod: 95,,, | Performed by: INTERNAL MEDICINE

## 2021-04-13 PROCEDURE — 99214 PR OFFICE/OUTPT VISIT, EST, LEVL IV, 30-39 MIN: ICD-10-PCS | Mod: 95,,, | Performed by: INTERNAL MEDICINE

## 2021-04-20 ENCOUNTER — PATIENT MESSAGE (OUTPATIENT)
Dept: RHEUMATOLOGY | Facility: CLINIC | Age: 56
End: 2021-04-20

## 2021-04-27 ENCOUNTER — PATIENT MESSAGE (OUTPATIENT)
Dept: RHEUMATOLOGY | Facility: CLINIC | Age: 56
End: 2021-04-27

## 2021-04-29 ENCOUNTER — PATIENT MESSAGE (OUTPATIENT)
Dept: RHEUMATOLOGY | Facility: CLINIC | Age: 56
End: 2021-04-29

## 2021-04-29 ENCOUNTER — TELEPHONE (OUTPATIENT)
Dept: ADMINISTRATIVE | Facility: HOSPITAL | Age: 56
End: 2021-04-29

## 2021-05-03 ENCOUNTER — PATIENT MESSAGE (OUTPATIENT)
Dept: RHEUMATOLOGY | Facility: CLINIC | Age: 56
End: 2021-05-03

## 2021-05-29 ENCOUNTER — SPECIALTY PHARMACY (OUTPATIENT)
Dept: PHARMACY | Facility: CLINIC | Age: 56
End: 2021-05-29

## 2021-06-02 ENCOUNTER — PATIENT MESSAGE (OUTPATIENT)
Dept: RHEUMATOLOGY | Facility: CLINIC | Age: 56
End: 2021-06-02

## 2021-06-02 RX ORDER — MYCOPHENOLATE MOFETIL 500 MG/1
1500 TABLET ORAL 2 TIMES DAILY
Qty: 180 TABLET | Refills: 4 | Status: SHIPPED | OUTPATIENT
Start: 2021-06-02 | End: 2021-09-13 | Stop reason: SDUPTHER

## 2021-06-03 ENCOUNTER — OFFICE VISIT (OUTPATIENT)
Dept: PODIATRY | Facility: CLINIC | Age: 56
End: 2021-06-03
Payer: COMMERCIAL

## 2021-06-03 ENCOUNTER — OFFICE VISIT (OUTPATIENT)
Dept: RHEUMATOLOGY | Facility: CLINIC | Age: 56
End: 2021-06-03
Payer: COMMERCIAL

## 2021-06-03 ENCOUNTER — LAB VISIT (OUTPATIENT)
Dept: LAB | Facility: HOSPITAL | Age: 56
End: 2021-06-03
Attending: INTERNAL MEDICINE
Payer: COMMERCIAL

## 2021-06-03 VITALS
BODY MASS INDEX: 21.63 KG/M2 | SYSTOLIC BLOOD PRESSURE: 137 MMHG | HEIGHT: 67 IN | DIASTOLIC BLOOD PRESSURE: 92 MMHG | WEIGHT: 137.81 LBS | HEART RATE: 83 BPM

## 2021-06-03 VITALS
WEIGHT: 140 LBS | SYSTOLIC BLOOD PRESSURE: 141 MMHG | DIASTOLIC BLOOD PRESSURE: 88 MMHG | BODY MASS INDEX: 21.97 KG/M2 | HEART RATE: 80 BPM | HEIGHT: 67 IN

## 2021-06-03 DIAGNOSIS — M32.9 SYSTEMIC LUPUS ERYTHEMATOSUS, UNSPECIFIED SLE TYPE, UNSPECIFIED ORGAN INVOLVEMENT STATUS: Primary | ICD-10-CM

## 2021-06-03 DIAGNOSIS — M79.671 CHRONIC PAIN OF BOTH FEET: ICD-10-CM

## 2021-06-03 DIAGNOSIS — I73.01 RAYNAUD'S DISEASE WITH GANGRENE: ICD-10-CM

## 2021-06-03 DIAGNOSIS — M79.672 CHRONIC PAIN OF BOTH FEET: ICD-10-CM

## 2021-06-03 DIAGNOSIS — G89.29 CHRONIC PAIN OF BOTH FEET: ICD-10-CM

## 2021-06-03 DIAGNOSIS — R26.89 BALANCE PROBLEM: ICD-10-CM

## 2021-06-03 DIAGNOSIS — M21.962 LEFT ANKLE JOINT DEFORMITY: Primary | ICD-10-CM

## 2021-06-03 DIAGNOSIS — M32.9 SYSTEMIC LUPUS ERYTHEMATOSUS, UNSPECIFIED SLE TYPE, UNSPECIFIED ORGAN INVOLVEMENT STATUS: ICD-10-CM

## 2021-06-03 DIAGNOSIS — Z79.899 ENCOUNTER FOR LONG TERM USE OF MYCOPHENOLATE MOFETIL: ICD-10-CM

## 2021-06-03 PROCEDURE — 1126F PR PAIN SEVERITY QUANTIFIED, NO PAIN PRESENT: ICD-10-PCS | Mod: S$GLB,,, | Performed by: INTERNAL MEDICINE

## 2021-06-03 PROCEDURE — 3008F BODY MASS INDEX DOCD: CPT | Mod: CPTII,S$GLB,, | Performed by: PODIATRIST

## 2021-06-03 PROCEDURE — 99214 OFFICE O/P EST MOD 30 MIN: CPT | Mod: S$GLB,,, | Performed by: PODIATRIST

## 2021-06-03 PROCEDURE — 99215 PR OFFICE/OUTPT VISIT, EST, LEVL V, 40-54 MIN: ICD-10-PCS | Mod: S$GLB,,, | Performed by: INTERNAL MEDICINE

## 2021-06-03 PROCEDURE — 99214 PR OFFICE/OUTPT VISIT, EST, LEVL IV, 30-39 MIN: ICD-10-PCS | Mod: S$GLB,,, | Performed by: PODIATRIST

## 2021-06-03 PROCEDURE — 1126F AMNT PAIN NOTED NONE PRSNT: CPT | Mod: S$GLB,,, | Performed by: INTERNAL MEDICINE

## 2021-06-03 PROCEDURE — 1125F PR PAIN SEVERITY QUANTIFIED, PAIN PRESENT: ICD-10-PCS | Mod: S$GLB,,, | Performed by: PODIATRIST

## 2021-06-03 PROCEDURE — 99999 PR PBB SHADOW E&M-EST. PATIENT-LVL V: ICD-10-PCS | Mod: PBBFAC,,, | Performed by: PODIATRIST

## 2021-06-03 PROCEDURE — 86480 TB TEST CELL IMMUN MEASURE: CPT | Performed by: INTERNAL MEDICINE

## 2021-06-03 PROCEDURE — 3008F PR BODY MASS INDEX (BMI) DOCUMENTED: ICD-10-PCS | Mod: CPTII,S$GLB,, | Performed by: PODIATRIST

## 2021-06-03 PROCEDURE — 99999 PR PBB SHADOW E&M-EST. PATIENT-LVL IV: ICD-10-PCS | Mod: PBBFAC,,, | Performed by: INTERNAL MEDICINE

## 2021-06-03 PROCEDURE — 3008F BODY MASS INDEX DOCD: CPT | Mod: CPTII,S$GLB,, | Performed by: INTERNAL MEDICINE

## 2021-06-03 PROCEDURE — 99215 OFFICE O/P EST HI 40 MIN: CPT | Mod: S$GLB,,, | Performed by: INTERNAL MEDICINE

## 2021-06-03 PROCEDURE — 99999 PR PBB SHADOW E&M-EST. PATIENT-LVL V: CPT | Mod: PBBFAC,,, | Performed by: PODIATRIST

## 2021-06-03 PROCEDURE — 3008F PR BODY MASS INDEX (BMI) DOCUMENTED: ICD-10-PCS | Mod: CPTII,S$GLB,, | Performed by: INTERNAL MEDICINE

## 2021-06-03 PROCEDURE — 99999 PR PBB SHADOW E&M-EST. PATIENT-LVL IV: CPT | Mod: PBBFAC,,, | Performed by: INTERNAL MEDICINE

## 2021-06-03 PROCEDURE — 1125F AMNT PAIN NOTED PAIN PRSNT: CPT | Mod: S$GLB,,, | Performed by: PODIATRIST

## 2021-06-03 RX ORDER — DICLOFENAC SODIUM 10 MG/G
2 GEL TOPICAL 4 TIMES DAILY
Qty: 1 TUBE | Refills: 11 | Status: SHIPPED | OUTPATIENT
Start: 2021-06-03 | End: 2022-10-31

## 2021-06-03 RX ORDER — ASPIRIN 81 MG/1
81 TABLET ORAL DAILY
COMMUNITY
Start: 2021-05-17

## 2021-06-03 RX ORDER — NIFEDIPINE 30 MG/1
60 TABLET, FILM COATED, EXTENDED RELEASE ORAL DAILY
Qty: 60 TABLET | Refills: 11 | Status: SHIPPED | OUTPATIENT
Start: 2021-06-03 | End: 2022-01-20 | Stop reason: SDUPTHER

## 2021-06-03 RX ORDER — GABAPENTIN 300 MG/1
CAPSULE ORAL
COMMUNITY
Start: 2021-05-13 | End: 2022-11-10

## 2021-06-04 ENCOUNTER — PATIENT MESSAGE (OUTPATIENT)
Dept: RHEUMATOLOGY | Facility: CLINIC | Age: 56
End: 2021-06-04

## 2021-06-05 ENCOUNTER — PATIENT MESSAGE (OUTPATIENT)
Dept: RHEUMATOLOGY | Facility: CLINIC | Age: 56
End: 2021-06-05

## 2021-06-07 LAB
GAMMA INTERFERON BACKGROUND BLD IA-ACNC: 0.03 IU/ML
M TB IFN-G CD4+ BCKGRND COR BLD-ACNC: 0.01 IU/ML
MITOGEN IGNF BCKGRD COR BLD-ACNC: 7.26 IU/ML
TB GOLD PLUS: NEGATIVE
TB2 - NIL: 0 IU/ML

## 2021-06-26 ENCOUNTER — PATIENT MESSAGE (OUTPATIENT)
Dept: RHEUMATOLOGY | Facility: CLINIC | Age: 56
End: 2021-06-26

## 2021-07-01 ENCOUNTER — SPECIALTY PHARMACY (OUTPATIENT)
Dept: PHARMACY | Facility: CLINIC | Age: 56
End: 2021-07-01

## 2021-07-09 ENCOUNTER — TELEPHONE (OUTPATIENT)
Dept: RHEUMATOLOGY | Facility: CLINIC | Age: 56
End: 2021-07-09

## 2021-07-29 ENCOUNTER — SPECIALTY PHARMACY (OUTPATIENT)
Dept: PHARMACY | Facility: CLINIC | Age: 56
End: 2021-07-29

## 2021-08-17 ENCOUNTER — PATIENT MESSAGE (OUTPATIENT)
Dept: RHEUMATOLOGY | Facility: CLINIC | Age: 56
End: 2021-08-17

## 2021-08-17 ENCOUNTER — OFFICE VISIT (OUTPATIENT)
Dept: RHEUMATOLOGY | Facility: CLINIC | Age: 56
End: 2021-08-17
Payer: COMMERCIAL

## 2021-08-17 ENCOUNTER — TELEPHONE (OUTPATIENT)
Dept: RHEUMATOLOGY | Facility: CLINIC | Age: 56
End: 2021-08-17

## 2021-08-17 DIAGNOSIS — Z79.899 ENCOUNTER FOR LONG TERM USE OF MYCOPHENOLATE MOFETIL: ICD-10-CM

## 2021-08-17 DIAGNOSIS — J84.9 ILD (INTERSTITIAL LUNG DISEASE): Primary | ICD-10-CM

## 2021-08-17 DIAGNOSIS — M32.9 SYSTEMIC LUPUS ERYTHEMATOSUS, UNSPECIFIED SLE TYPE, UNSPECIFIED ORGAN INVOLVEMENT STATUS: ICD-10-CM

## 2021-08-17 PROCEDURE — 99215 PR OFFICE/OUTPT VISIT, EST, LEVL V, 40-54 MIN: ICD-10-PCS | Mod: 95,,, | Performed by: INTERNAL MEDICINE

## 2021-08-17 PROCEDURE — 1125F AMNT PAIN NOTED PAIN PRSNT: CPT | Mod: CPTII,,, | Performed by: INTERNAL MEDICINE

## 2021-08-17 PROCEDURE — 1125F PR PAIN SEVERITY QUANTIFIED, PAIN PRESENT: ICD-10-PCS | Mod: CPTII,,, | Performed by: INTERNAL MEDICINE

## 2021-08-17 PROCEDURE — 1159F PR MEDICATION LIST DOCUMENTED IN MEDICAL RECORD: ICD-10-PCS | Mod: CPTII,,, | Performed by: INTERNAL MEDICINE

## 2021-08-17 PROCEDURE — 1159F MED LIST DOCD IN RCRD: CPT | Mod: CPTII,,, | Performed by: INTERNAL MEDICINE

## 2021-08-17 PROCEDURE — 99215 OFFICE O/P EST HI 40 MIN: CPT | Mod: 95,,, | Performed by: INTERNAL MEDICINE

## 2021-08-20 ENCOUNTER — PATIENT MESSAGE (OUTPATIENT)
Dept: RHEUMATOLOGY | Facility: CLINIC | Age: 56
End: 2021-08-20

## 2021-08-26 ENCOUNTER — SPECIALTY PHARMACY (OUTPATIENT)
Dept: PHARMACY | Facility: CLINIC | Age: 56
End: 2021-08-26

## 2021-09-02 ENCOUNTER — PATIENT MESSAGE (OUTPATIENT)
Dept: RHEUMATOLOGY | Facility: CLINIC | Age: 56
End: 2021-09-02

## 2021-09-13 ENCOUNTER — PATIENT MESSAGE (OUTPATIENT)
Dept: RHEUMATOLOGY | Facility: CLINIC | Age: 56
End: 2021-09-13

## 2021-09-13 RX ORDER — HYDROXYCHLOROQUINE SULFATE 200 MG/1
200 TABLET, FILM COATED ORAL 2 TIMES DAILY
Qty: 180 TABLET | Refills: 3 | Status: SHIPPED | OUTPATIENT
Start: 2021-09-13 | End: 2022-01-20 | Stop reason: SDUPTHER

## 2021-09-13 RX ORDER — MYCOPHENOLATE MOFETIL 500 MG/1
1500 TABLET ORAL 2 TIMES DAILY
Qty: 180 TABLET | Refills: 4 | Status: SHIPPED | OUTPATIENT
Start: 2021-09-13 | End: 2022-01-20 | Stop reason: SDUPTHER

## 2021-09-15 ENCOUNTER — PATIENT MESSAGE (OUTPATIENT)
Dept: RHEUMATOLOGY | Facility: CLINIC | Age: 56
End: 2021-09-15

## 2021-09-17 ENCOUNTER — PATIENT MESSAGE (OUTPATIENT)
Dept: RHEUMATOLOGY | Facility: CLINIC | Age: 56
End: 2021-09-17

## 2021-10-15 ENCOUNTER — SPECIALTY PHARMACY (OUTPATIENT)
Dept: PHARMACY | Facility: CLINIC | Age: 56
End: 2021-10-15

## 2021-11-12 ENCOUNTER — SPECIALTY PHARMACY (OUTPATIENT)
Dept: PHARMACY | Facility: CLINIC | Age: 56
End: 2021-11-12
Payer: COMMERCIAL

## 2021-12-13 ENCOUNTER — SPECIALTY PHARMACY (OUTPATIENT)
Dept: PHARMACY | Facility: CLINIC | Age: 56
End: 2021-12-13
Payer: COMMERCIAL

## 2022-01-11 ENCOUNTER — SPECIALTY PHARMACY (OUTPATIENT)
Dept: PHARMACY | Facility: CLINIC | Age: 57
End: 2022-01-11
Payer: COMMERCIAL

## 2022-01-11 NOTE — TELEPHONE ENCOUNTER
Patient states she will call back with correct CC for copay. Patient agreed to pay the $84.56 copay since it is going to her deductible. Will f/u

## 2022-01-14 NOTE — TELEPHONE ENCOUNTER
Specialty Pharmacy - Refill Coordination  Specialty Pharmacy - Clinical Intervention    Patient was transferred to [facility] for Revatio refill. Patient estimates enough to last until next shipment. Reviewed how to manage missed doses should patient not have enough. Reviewed to not double up doses to make up for missed doses. She voiced understanding. She reported starting Lyrica. She said she is no longer taking gabapentin or Percocet. No DDIs of concern upon review of med list.  Patient had no further questions or concerns.     Specialty Medication Orders Linked to Encounter    Flowsheet Row Most Recent Value   Medication #1 sildenafil (REVATIO) 20 mg Tab (Order#[id number], Rx#[id number])          Refill Questions - Documented Responses    Flowsheet Row Most Recent Value   Patient Availability and HIPAA Verification    Does patient want to proceed with activity? Yes   HIPAA/medical authority confirmed? Yes   Relationship to patient of person spoken to? Self   Refill Screening Questions    Changes to allergies? No   Changes to medications? Yes  [Patient informed she is taking Lyrica. Patient informed she is no longer taking gabapentin or Percocet]   New conditions since last clinic visit? No   Unplanned office visit, urgent care, ED, or hospital admission in the last 4 weeks? No   How does patient/caregiver feel medication is working? Good   Financial problems or insurance changes? No   How many doses of your specialty medications were missed in the last 4 weeks? 0  [Patient estimates enough to last until next shipment. Reviewed how to manage missed doses should patient not have enough. She voiced understanding.]   Would patient like to speak to a pharmacist? Yes   When does the patient need to receive the medication? 01/19/22   Refill Delivery Questions    How will the patient receive the medication? Mail   When does the patient need to receive the medication? 01/19/22   Shipping Address Home   Address in [place]  Ambulatory confirmed and updated if neccessary? Yes   Expected Copay ($) 84.56   Is the patient able to afford the medication copay? Yes   Payment Method CC on file, new CC added to file   Days supply of Refill 30   Supplies needed? No supplies needed   Refill activity completed? Yes   Refill activity plan Refill scheduled   Shipment/Pickup Date: 22          Current Outpatient Medications   Medication Sig Note    pregabalin (LYRICA ORAL) Take by mouth.     amLODIPine (NORVASC) 10 MG tablet Take 1 tablet (10 mg total) by mouth once daily.     aspirin (ECOTRIN) 81 MG EC tablet Take 81 mg by mouth once daily.     blood sugar diagnostic Strp Checking blood glucose 4 times daily.     blood-glucose meter Misc Use as instructed.     collagenase (SANTYL) ointment Apply topically once daily.     diclofenac sodium (VOLTAREN) 1 % Gel Apply 1 Dose topically.     diclofenac sodium (VOLTAREN) 1 % Gel Apply 2 g topically 4 (four) times daily.     estradioL (ESTRACE) 1 MG tablet Take 1 tablet by mouth once daily.     folic acid (FOLVITE) 1 MG tablet Take 1 tablet by mouth once daily.     gabapentin (NEURONTIN) 300 MG capsule SMARTSI Capsule(s) By Mouth Every Evening 2022: Patient reported no longer taking    hydrOXYchloroQUINE (PLAQUENIL) 200 mg tablet Take 1 tablet (200 mg total) by mouth 2 (two) times daily.     lancets 33 gauge Misc Test 4 (four) times daily before meals and nightly.     levothyroxine (SYNTHROID) 50 MCG tablet Take 1 tablet (50 mcg total) by mouth every morning.     mycophenolate (CELLCEPT) 250 mg Cap Take 2 capsules (500 mg total) by mouth 2 (two) times daily.     mycophenolate (CELLCEPT) 500 mg Tab Take 3 tablets (1,500 mg total) by mouth 2 (two) times daily.     NIFEdipine (ADALAT CC) 30 MG TbSR Take 2 tablets (60 mg total) by mouth once daily.     oxyCODONE-acetaminophen (PERCOCET)  mg per tablet Take 1 tablet by mouth every 24 hours as needed for Pain. 2022:  "Patient reported no longer taking      pantoprazole (PROTONIX) 40 MG tablet Take 1 tablet (40 mg total) by mouth once daily.     pen needle, diabetic 32 gauge x 5/32" Ndle Inject 4 times daily.     predniSONE (DELTASONE) 20 MG tablet Take 60 mg by mouth once daily.     predniSONE (DELTASONE) 5 MG tablet Take 1 tablet (5 mg total) by mouth once daily.     progesterone (PROMETRIUM) 200 MG capsule Take 1 capsule by mouth every evening.     sildenafil (REVATIO) 20 mg Tab Take 1 tablet (20 mg total) by mouth 3 (three) times daily.     sildenafil (REVATIO) 20 mg Tab Take 1 tablet (20 mg total) by mouth 3 (three) times daily.     sulfamethoxazole-trimethoprim 800-160mg (BACTRIM DS) 800-160 mg Tab Take 1 tablet by mouth every Mon, Wed, Fri.    Last reviewed on 1/13/2022  6:57 PM by Lavinia Zhou PharmD    Review of patient's allergies indicates:  No Known Allergies Last reviewed on  9/13/2021 3:10 PM by Laurel Johnson      Tasks added this encounter   No tasks added.   Tasks due within next 3 months   1/9/2022 - Refill Call (Auto Added)     [facility]  [address] [place] [id number]  Phone: [phone]  Fax: [phone]      "

## 2022-01-20 ENCOUNTER — OFFICE VISIT (OUTPATIENT)
Dept: RHEUMATOLOGY | Facility: CLINIC | Age: 57
End: 2022-01-20
Payer: COMMERCIAL

## 2022-01-20 DIAGNOSIS — M32.9 SYSTEMIC LUPUS ERYTHEMATOSUS, UNSPECIFIED SLE TYPE, UNSPECIFIED ORGAN INVOLVEMENT STATUS: ICD-10-CM

## 2022-01-20 DIAGNOSIS — I73.01 RAYNAUD'S DISEASE WITH GANGRENE: ICD-10-CM

## 2022-01-20 DIAGNOSIS — Z79.899 ENCOUNTER FOR LONG TERM USE OF MYCOPHENOLATE MOFETIL: ICD-10-CM

## 2022-01-20 DIAGNOSIS — J84.9 ILD (INTERSTITIAL LUNG DISEASE): Primary | ICD-10-CM

## 2022-01-20 PROCEDURE — 99214 PR OFFICE/OUTPT VISIT, EST, LEVL IV, 30-39 MIN: ICD-10-PCS | Mod: 95,,, | Performed by: INTERNAL MEDICINE

## 2022-01-20 PROCEDURE — 99214 OFFICE O/P EST MOD 30 MIN: CPT | Mod: 95,,, | Performed by: INTERNAL MEDICINE

## 2022-01-20 RX ORDER — HYDROXYCHLOROQUINE SULFATE 200 MG/1
200 TABLET, FILM COATED ORAL 2 TIMES DAILY
Qty: 180 TABLET | Refills: 3 | Status: SHIPPED | OUTPATIENT
Start: 2022-01-20 | End: 2022-08-26 | Stop reason: SDUPTHER

## 2022-01-20 RX ORDER — MYCOPHENOLATE MOFETIL 500 MG/1
1500 TABLET ORAL 2 TIMES DAILY
Qty: 180 TABLET | Refills: 4 | Status: SHIPPED | OUTPATIENT
Start: 2022-01-20 | End: 2022-08-26 | Stop reason: SDUPTHER

## 2022-01-20 RX ORDER — SILDENAFIL CITRATE 20 MG/1
20 TABLET ORAL 3 TIMES DAILY
Qty: 90 TABLET | Refills: 11 | Status: SHIPPED | OUTPATIENT
Start: 2022-01-20 | End: 2022-01-20 | Stop reason: SDUPTHER

## 2022-01-20 RX ORDER — NIFEDIPINE 30 MG/1
60 TABLET, FILM COATED, EXTENDED RELEASE ORAL DAILY
Qty: 60 TABLET | Refills: 11 | Status: SHIPPED | OUTPATIENT
Start: 2022-01-20 | End: 2022-08-26 | Stop reason: SDUPTHER

## 2022-01-20 RX ORDER — SILDENAFIL CITRATE 20 MG/1
20 TABLET ORAL 3 TIMES DAILY
Qty: 90 TABLET | Refills: 11 | Status: SHIPPED | OUTPATIENT
Start: 2022-01-20 | End: 2022-12-09 | Stop reason: SDUPTHER

## 2022-01-20 NOTE — PROGRESS NOTES
Answers for HPI/ROS submitted by the patient on 1/19/2022  fever: No  eye redness: No  mouth sores: No  headaches: Yes  shortness of breath: No  chest pain: No  trouble swallowing: No  diarrhea: Yes  constipation: No  unexpected weight change: No  genital sore: No  dysuria: No  During the last 3 days, have you had a skin rash?: No  Bruises or bleeds easily: No  cough: No

## 2022-01-20 NOTE — PROGRESS NOTES
Subjective:       Patient ID: Christen Bowman is a 55 y.o. female.    Chief Complaint: No chief complaint on file.    HPI 55 year old F with PMH of HTN, hypothyroidism, SLE, RA here to establish care. She was diagnosed with SLE in 1996  when she presented with +KENNY, arthritis , rash, raynauds,pleurisy (pericarditis),  and thrombocytopenia.  She reports she came in to hospital due to chest pain and pain/swelling in hands and elbows.  She was told to have pericardial effusion.   She was hospitalized for thrombocytopenia around age 22 and was treated with high dose steroids and Iv IG/plasmapheresis. She required splenectomy in   Due to thrombocytopenia.  She had one first trimester pregnancy loss. She was initially put on plaquenil when she was diagnosed but it was stopped at some point.        About a year ago (1/2020), she developed aphasia/confusion and had CVA. She was treated with high doses of steroids and Iv IG but no anticoagulation.  Since the stroke, she is more forgetful.        She as recently hospitalized in outside hospital for arthritis, raynauds causing ulcerations with digital ischemia and bacteremia.   It appears on December 12,2020, she developed URI symptoms (covid test-negative). She was having sinus drainage. She went to urgent care.  She went home and the next day,she noticed raynauds in hands and feet.  Within a matter of hours, she developed the blisters in lower extremities.   She was noted to have platelets of 5000 and creat 1.6 (baseline 0.64). White count had increased to 30 thousand. During admission, she developed severe ulcerations in her lower legs and toes and gangrene developed in toes. She was treated with high doses of steroids. She was also treated with antibiotics for gram positive cocci in blood cultures. Apparently, she also had bone marrow biopsy which did not show any significant changes and  BALDO did not show endocarditis.  During hospitalization, patient developed worsening  ulcerations and gangrene of multiple toes. She had lower extremity US which did not show DVT and was not put on anticoagulation. She was discharged on prednisone 80mg daily, mtx 10mg po week, diltiazem 360mg ER, nystatin, tylenol-3, estradiol and HCTZ 25mg poqday.        She is on clindamycin 300mg po q8 hours , prednisone 10mg a day, MTX 4 pills once a week, folic acid, oxycodone 10/325mg po qhs. She saw Dr. Yash Santana about 2 weeks ago. At that time, she was put on clindamycin. She saw orthopedic who is considering amputation.  Denies fevers, pleurisy, sob, alopecia. Reports raynauds in hands and feet.    Interval history: She has not been in the hospital. She is off prednisone.  She is s/p partial amputation on right side on march 30, 2021.  She is taking cellcept 1500mg po BID.She is on sildenafil TID. She is taking nifedipine 30mg qhs instead of 60mg po qhs due to increased urination.    Past Medical History:   Diagnosis Date    Raynaud's disease     Rheumatoid arteritis     Systemic lupus erythematosus    ,  Review of Systems   Constitutional: Negative for activity change, appetite change, chills, diaphoresis, fatigue and fever.   HENT: Negative for congestion, ear discharge, ear pain, facial swelling, mouth sores, sinus pressure, sneezing, sore throat, tinnitus and trouble swallowing.    Eyes: Negative for photophobia, pain, discharge, redness, itching and visual disturbance.   Respiratory: Negative for apnea, chest tightness, shortness of breath, wheezing and stridor.    Cardiovascular: Negative for leg swelling.   Gastrointestinal: Negative for abdominal distention, abdominal pain, anal bleeding, blood in stool, constipation, diarrhea and nausea.   Endocrine: Negative for cold intolerance and heat intolerance.   Genitourinary: Negative for difficulty urinating and dysuria.   Musculoskeletal: Negative for arthralgias, back pain, gait problem, joint swelling, myalgias, neck pain and neck stiffness.    Skin: Negative for color change, pallor, rash and wound.   Neurological: Negative for dizziness, seizures, light-headedness and numbness.   Hematological: Negative for adenopathy. Does not bruise/bleed easily.   Psychiatric/Behavioral: Negative for sleep disturbance. The patient is not nervous/anxious.              Objective:   There were no vitals taken for this visit.   BP: 142/98  Physical Exam   Constitutional: She is oriented to person, place, and time.   HENT:   Head: Normocephalic and atraumatic.   Right Ear: External ear normal.   Left Ear: External ear normal.   Nose: Nose normal.   Mouth/Throat: Oropharynx is clear and moist. No oropharyngeal exudate.   Eyes: Conjunctivae and EOM are normal. Pupils are equal, round, and reactive to light. Right eye exhibits no discharge. Left eye exhibits no discharge. No scleral icterus.   Neck: No JVD present. No thyromegaly present.   Cardiovascular: Normal rate, regular rhythm, normal heart sounds and intact distal pulses.  Exam reveals no gallop and no friction rub.    No murmur heard.  Pulmonary/Chest: Effort normal and breath sounds normal. No respiratory distress. She has no wheezes. She has no rales. She exhibits no tenderness.   Abdominal: Soft. Bowel sounds are normal. She exhibits no distension and no mass. There is no abdominal tenderness. There is no rebound and no guarding.   Lymphadenopathy:     She has no cervical adenopathy.   Neurological: She is alert and oriented to person, place, and time. No cranial nerve deficit. Gait normal. Coordination normal.   Skin: Skin is dry. No rash noted. No erythema. No pallor.     Psychiatric: Affect and judgment normal.   Musculoskeletal: Tenderness and edema present. No deformity.      Comments: Gangrenous digits in left foot;   Dark Discoloration in second through fourth toes on right    Lower shins wrapped      labs: 3/3/2021  Wbc-12.6  Hematocrit-37  Plate-389  Labs 2/3/21  cmp-[id number]  No data to display      Assessment:     56 year old F with PMH of HTN, hypothyroidism, SLE, RA here for follow up of SLE. She was diagnosed with SLE in 1996  when she presented with +KENNY,+dsdna,  arthritis , rash, raynauds,pleurisy (pericarditis),  and thrombocytopenia. She was recently hospitalized for severe raynauds leading to gangrene in right foot and is s/p partial amputation 3/30/2021.  During that hospitalization, she was noted to have  abnormal CT chest concerning for ILD.    Given her history of CVA, first trimester pregnancy loss, and progressive gangrene, patient was admitted for anticoagulation for suspected APS but APS work up was negative.  She was admitted from 2/5 to 2/11/21 for  epoprostenol for 5 days and pulse dose steroids.    SLE:  +KENNY,+dsdna,  arthritis , rash, raynauds,pleurisy (pericarditis), thrombocytopenia, severe raynauds requiring amputation.  #raynauds: doing much better.  Continue nifedipine 30mg po qhs (decreased from 60mg po qhs because she noticed increased urination at night and couldn't sleep)  Continue sildenafil 20mg po TID  Continue cellcept 1500mg po BID  Continue plaquenil twice a day  Continue aspirin 81 mg po qday  Consider benylsta injections if needed.  LABS done outside. WILL REQUEST labs to be faxed.  Patient will contact me once she gets labs done    #ILD: noted on CT chest. Denies symptoms  -cellcept as above  Pulmonary consult placed at last visit    The patient location is: home    The chief complaint leading to consultation is: joint pain     Visit type: audiovisual    Face to Face time with patient:30  minutes of total time spent on the encounter, which includes face to face time and non-face to face time preparing to see the patient (eg, review of tests), Obtaining and/or reviewing separately obtained history, Documenting clinical information in the electronic or other health record, Independently interpreting results (not separately reported) and communicating results to the  patient/family/caregiver, or Care coordination (not separately reported).         Each patient to whom he or she provides medical services by telemedicine is:  (1) informed of the relationship between the physician and patient and the respective role of any other health care provider with respect to management of the patient; and (2) notified that he or she may decline to receive medical services by telemedicine and may withdraw from such care at any time.    Notes:

## 2022-02-14 ENCOUNTER — SPECIALTY PHARMACY (OUTPATIENT)
Dept: PHARMACY | Facility: CLINIC | Age: 57
End: 2022-02-14
Payer: COMMERCIAL

## 2022-02-14 NOTE — TELEPHONE ENCOUNTER
Specialty Pharmacy - Refill Coordination    Specialty Medication Orders Linked to Encounter    Flowsheet Row Most Recent Value   Medication #1 sildenafil (REVATIO) 20 mg Tab (Order#[id number], Rx#[id number])          Refill Questions - Documented Responses    Flowsheet Row Most Recent Value   Patient Availability and HIPAA Verification    Does patient want to proceed with activity? Yes   HIPAA/medical authority confirmed? Yes   Relationship to patient of person spoken to? Self   Refill Screening Questions    Changes to allergies? No   Changes to medications? No   New conditions since last clinic visit? No   Unplanned office visit, urgent care, ED, or hospital admission in the last 4 weeks? No   How does patient/caregiver feel medication is working? Good   Financial problems or insurance changes? No   How many doses of your specialty medications were missed in the last 4 weeks? 0   Would patient like to speak to a pharmacist? No   When does the patient need to receive the medication? 02/21/22   Refill Delivery Questions    When does the patient need to receive the medication? 02/21/22   Shipping Address Home   Address in [facility] confirmed and updated if neccessary? Yes   Expected Copay ($) 83.37   Is the patient able to afford the medication copay? Yes   Payment Method CC on file   Days supply of Refill 30   Supplies needed? No supplies needed   Refill activity completed? Yes   Shipment/Pickup Date: 02/15/22          Current Outpatient Medications   Medication Sig    amLODIPine (NORVASC) 10 MG tablet Take 1 tablet (10 mg total) by mouth once daily.    aspirin (ECOTRIN) 81 MG EC tablet Take 81 mg by mouth once daily.    blood sugar diagnostic Strp Checking blood glucose 4 times daily.    blood-glucose meter Misc Use as instructed.    collagenase (SANTYL) ointment Apply topically once daily. (Patient not taking: Reported on 1/20/2022)    diclofenac sodium (VOLTAREN) 1 % Gel Apply 1 Dose topically.     "diclofenac sodium (VOLTAREN) 1 % Gel Apply 2 g topically 4 (four) times daily.    estradioL (ESTRACE) 1 MG tablet Take 1 tablet by mouth once daily.    folic acid (FOLVITE) 1 MG tablet Take 1 tablet by mouth once daily.    gabapentin (NEURONTIN) 300 MG capsule SMARTSI Capsule(s) By Mouth Every Evening    hydrOXYchloroQUINE (PLAQUENIL) 200 mg tablet Take 1 tablet (200 mg total) by mouth 2 (two) times daily.    lancets 33 gauge Misc Test 4 (four) times daily before meals and nightly.    levothyroxine (SYNTHROID) 50 MCG tablet Take 1 tablet (50 mcg total) by mouth every morning.    mycophenolate (CELLCEPT) 500 mg Tab Take 3 tablets (1,500 mg total) by mouth 2 (two) times daily.    NIFEdipine (ADALAT CC) 30 MG TbSR Take 2 tablets (60 mg total) by mouth once daily.    oxyCODONE-acetaminophen (PERCOCET)  mg per tablet Take 1 tablet by mouth every 24 hours as needed for Pain.    pantoprazole (PROTONIX) 40 MG tablet Take 1 tablet (40 mg total) by mouth once daily.    pen needle, diabetic 32 gauge x 5/32" Ndle Inject 4 times daily.    predniSONE (DELTASONE) 20 MG tablet Take 60 mg by mouth once daily.    predniSONE (DELTASONE) 5 MG tablet Take 1 tablet (5 mg total) by mouth once daily.    pregabalin (LYRICA ORAL) Take by mouth.    progesterone (PROMETRIUM) 200 MG capsule Take 1 capsule by mouth every evening.    sildenafil (REVATIO) 20 mg Tab Take 1 tablet (20 mg total) by mouth 3 (three) times daily.    sulfamethoxazole-trimethoprim 800-160mg (BACTRIM DS) 800-160 mg Tab Take 1 tablet by mouth every Mon, Wed, Fri.   Last reviewed on 2022  8:23 AM by Laurel Johnson MD    Review of patient's allergies indicates:  No Known Allergies Last reviewed on  2022 8:01 AM by Raine Bland      Tasks added this encounter   3/16/2022 - Refill Call (Auto Added)   Tasks due within next 3 months   No tasks due.     Sheron Roa, PharmD  [facility]  [address]  [place] [address]  Phone: [phone]  Fax: [phone]

## 2022-02-14 NOTE — TELEPHONE ENCOUNTER
Received Sildenafil refill Rx. BI complete via [facility] website. Releasing Rx to [facility].     Deductible: $4707.57 - met $82.84 thus far  Max OOP: $6407.57 - met $82.84 thus far  Expected copay: $82.84  [facility] in-network. No penalty for pt to fill with [facility]

## 2022-02-17 ENCOUNTER — PATIENT MESSAGE (OUTPATIENT)
Dept: RHEUMATOLOGY | Facility: CLINIC | Age: 57
End: 2022-02-17
Payer: COMMERCIAL

## 2022-02-17 NOTE — TELEPHONE ENCOUNTER
Sildenafil PA on file expires 03/18/22.  Submitted PA via CMM for continued approval.   (Key: [id number])

## 2022-02-18 NOTE — TELEPHONE ENCOUNTER
Sildenafil PA approved  Auth# [id number]  2/3/22-2/17/23 2022 BI:   Deductible $2500  MaxOOP $3350  Copay $83.37  [facility] in network

## 2022-02-21 ENCOUNTER — PATIENT MESSAGE (OUTPATIENT)
Dept: RHEUMATOLOGY | Facility: CLINIC | Age: 57
End: 2022-02-21
Payer: COMMERCIAL

## 2022-03-08 ENCOUNTER — PATIENT MESSAGE (OUTPATIENT)
Dept: RHEUMATOLOGY | Facility: CLINIC | Age: 57
End: 2022-03-08
Payer: COMMERCIAL

## 2022-03-11 ENCOUNTER — TELEPHONE (OUTPATIENT)
Dept: RHEUMATOLOGY | Facility: CLINIC | Age: 57
End: 2022-03-11
Payer: COMMERCIAL

## 2022-03-11 NOTE — TELEPHONE ENCOUNTER
Outside labs:  1/26/22  Wbc-6  Hematocrit-45.7  Plat-491  Alc-3200  anc-2160  crp-negativr  Esr-7  c3-98  c4-23  Dsdna-neg  upc-negative  Hep B surface Ag-negative  Quant gold- negative

## 2022-03-16 ENCOUNTER — SPECIALTY PHARMACY (OUTPATIENT)
Dept: PHARMACY | Facility: CLINIC | Age: 57
End: 2022-03-16
Payer: COMMERCIAL

## 2022-03-16 NOTE — TELEPHONE ENCOUNTER
Specialty Pharmacy - Refill Coordination    Specialty Medication Orders Linked to Encounter    Flowsheet Row Most Recent Value   Medication #1 sildenafil (REVATIO) 20 mg Tab (Order#[id number], Rx#[id number])          Refill Questions - Documented Responses    Flowsheet Row Most Recent Value   Patient Availability and HIPAA Verification    Does patient want to proceed with activity? Yes   HIPAA/medical authority confirmed? Yes   Relationship to patient of person spoken to? Self   Refill Screening Questions    Changes to allergies? No   Changes to medications? No   New conditions since last clinic visit? No   Unplanned office visit, urgent care, ED, or hospital admission in the last 4 weeks? No   How does patient/caregiver feel medication is working? Good   Financial problems or insurance changes? No   How many doses of your specialty medications were missed in the last 4 weeks? 0   Would patient like to speak to a pharmacist? No   When does the patient need to receive the medication? 03/26/22   Refill Delivery Questions    How will the patient receive the medication? Mail   When does the patient need to receive the medication? 03/26/22   Shipping Address Home   Address in [facility] confirmed and updated if neccessary? Yes   Expected Copay ($) 83.37   Is the patient able to afford the medication copay? Yes   Payment Method CC on file   Days supply of Refill 30   Supplies needed? No supplies needed   Refill activity completed? Yes   Refill activity plan Refill scheduled   Shipment/Pickup Date: 03/24/22          Current Outpatient Medications   Medication Sig    amLODIPine (NORVASC) 10 MG tablet Take 1 tablet (10 mg total) by mouth once daily.    aspirin (ECOTRIN) 81 MG EC tablet Take 81 mg by mouth once daily.    blood sugar diagnostic Strp Checking blood glucose 4 times daily.    blood-glucose meter Misc Use as instructed.    collagenase (SANTYL) ointment Apply topically once daily. (Patient not taking:  "Reported on 2022)    diclofenac sodium (VOLTAREN) 1 % Gel Apply 1 Dose topically.    diclofenac sodium (VOLTAREN) 1 % Gel Apply 2 g topically 4 (four) times daily.    estradioL (ESTRACE) 1 MG tablet Take 1 tablet by mouth once daily.    folic acid (FOLVITE) 1 MG tablet Take 1 tablet by mouth once daily.    gabapentin (NEURONTIN) 300 MG capsule SMARTSI Capsule(s) By Mouth Every Evening    hydrOXYchloroQUINE (PLAQUENIL) 200 mg tablet Take 1 tablet (200 mg total) by mouth 2 (two) times daily.    lancets 33 gauge Misc Test 4 (four) times daily before meals and nightly.    levothyroxine (SYNTHROID) 50 MCG tablet Take 1 tablet (50 mcg total) by mouth every morning.    mycophenolate (CELLCEPT) 500 mg Tab Take 3 tablets (1,500 mg total) by mouth 2 (two) times daily.    NIFEdipine (ADALAT CC) 30 MG TbSR Take 2 tablets (60 mg total) by mouth once daily.    oxyCODONE-acetaminophen (PERCOCET)  mg per tablet Take 1 tablet by mouth every 24 hours as needed for Pain.    pantoprazole (PROTONIX) 40 MG tablet Take 1 tablet (40 mg total) by mouth once daily.    pen needle, diabetic 32 gauge x 5/32" Ndle Inject 4 times daily.    predniSONE (DELTASONE) 20 MG tablet Take 60 mg by mouth once daily.    predniSONE (DELTASONE) 5 MG tablet Take 1 tablet (5 mg total) by mouth once daily.    pregabalin (LYRICA ORAL) Take by mouth.    progesterone (PROMETRIUM) 200 MG capsule Take 1 capsule by mouth every evening.    sildenafil (REVATIO) 20 mg Tab Take 1 tablet (20 mg total) by mouth 3 (three) times daily.    sulfamethoxazole-trimethoprim 800-160mg (BACTRIM DS) 800-160 mg Tab Take 1 tablet by mouth every Mon, Wed, Fri.   Last reviewed on 2022  8:23 AM by Laurel Johnson MD    Review of patient's allergies indicates:  No Known Allergies Last reviewed on  2022 8:01 AM by Raine Bland      Tasks added this encounter   No tasks added.   Tasks due within next 3 months   3/16/2022 - Refill Call (Auto " Added)     [facility]  [address]  [facility] [address]  Phone: [phone]  Fax: [phone]

## 2022-04-18 ENCOUNTER — SPECIALTY PHARMACY (OUTPATIENT)
Dept: PHARMACY | Facility: CLINIC | Age: 57
End: 2022-04-18
Payer: COMMERCIAL

## 2022-04-18 ENCOUNTER — PATIENT MESSAGE (OUTPATIENT)
Dept: ADMINISTRATIVE | Facility: OTHER | Age: 57
End: 2022-04-18
Payer: COMMERCIAL

## 2022-04-18 NOTE — TELEPHONE ENCOUNTER
Specialty Pharmacy - Refill Coordination    Specialty Medication Orders Linked to Encounter    Flowsheet Row Most Recent Value   Medication #1 sildenafil (REVATIO) 20 mg Tab (Order#[id number], Rx#[id number])          Refill Questions - Documented Responses    Flowsheet Row Most Recent Value   Patient Availability and HIPAA Verification    Does patient want to proceed with activity? Yes   HIPAA/medical authority confirmed? Yes   Relationship to patient of person spoken to? Self   Refill Screening Questions    Changes to allergies? No   Changes to medications? No   New conditions since last clinic visit? No   Unplanned office visit, urgent care, ED, or hospital admission in the last 4 weeks? No   How does patient/caregiver feel medication is working? Good   Financial problems or insurance changes? No   How many doses of your specialty medications were missed in the last 4 weeks? 0   Would patient like to speak to a pharmacist? No   When does the patient need to receive the medication? 04/27/22   Refill Delivery Questions    How will the patient receive the medication? Mail   When does the patient need to receive the medication? 04/27/22   Shipping Address Home   Address in [facility] confirmed and updated if neccessary? Yes   Expected Copay ($) 83.37   Is the patient able to afford the medication copay? Yes   Payment Method CC on file   Days supply of Refill 30   Supplies needed? No supplies needed   Refill activity completed? Yes   Refill activity plan Refill scheduled   Shipment/Pickup Date: 04/21/22          Current Outpatient Medications   Medication Sig    amLODIPine (NORVASC) 10 MG tablet Take 1 tablet (10 mg total) by mouth once daily.    aspirin (ECOTRIN) 81 MG EC tablet Take 81 mg by mouth once daily.    blood sugar diagnostic Strp Checking blood glucose 4 times daily.    blood-glucose meter Misc Use as instructed.    collagenase (SANTYL) ointment Apply topically once daily. (Patient not taking:  "Reported on 2022)    diclofenac sodium (VOLTAREN) 1 % Gel Apply 1 Dose topically.    diclofenac sodium (VOLTAREN) 1 % Gel Apply 2 g topically 4 (four) times daily.    estradioL (ESTRACE) 1 MG tablet TAKE ONE TABLET BY MOUTH EVERY DAY    folic acid (FOLVITE) 1 MG tablet Take 1 tablet by mouth once daily.    gabapentin (NEURONTIN) 300 MG capsule SMARTSI Capsule(s) By Mouth Every Evening    hydrOXYchloroQUINE (PLAQUENIL) 200 mg tablet Take 1 tablet (200 mg total) by mouth 2 (two) times daily.    lancets 33 gauge Misc Test 4 (four) times daily before meals and nightly.    levothyroxine (SYNTHROID) 50 MCG tablet Take 1 tablet (50 mcg total) by mouth every morning.    mycophenolate (CELLCEPT) 500 mg Tab Take 3 tablets (1,500 mg total) by mouth 2 (two) times daily.    NIFEdipine (ADALAT CC) 30 MG TbSR Take 2 tablets (60 mg total) by mouth once daily.    oxyCODONE-acetaminophen (PERCOCET)  mg per tablet Take 1 tablet by mouth every 24 hours as needed for Pain.    pantoprazole (PROTONIX) 40 MG tablet Take 1 tablet (40 mg total) by mouth once daily.    pen needle, diabetic 32 gauge x 5/32" Ndle Inject 4 times daily.    predniSONE (DELTASONE) 20 MG tablet Take 60 mg by mouth once daily.    predniSONE (DELTASONE) 5 MG tablet Take 1 tablet (5 mg total) by mouth once daily.    pregabalin (LYRICA ORAL) Take by mouth.    progesterone (PROMETRIUM) 200 MG capsule TAKE ONE CAPSULE BY MOUTH EVERY DAY    sildenafil (REVATIO) 20 mg Tab Take 1 tablet (20 mg total) by mouth 3 (three) times daily.    sulfamethoxazole-trimethoprim 800-160mg (BACTRIM DS) 800-160 mg Tab Take 1 tablet by mouth every Mon, Wed, Fri.   Last reviewed on 2022  8:23 AM by Laurel Johnson MD    Review of patient's allergies indicates:  No Known Allergies Last reviewed on  2022 8:01 AM by Raine Bland      Tasks added this encounter   2022 - Refill Call (Auto Added)   Tasks due within next 3 months   No tasks due. "     [facility]  [address]  [facility] [address]  Phone: [phone]  Fax: [phone]

## 2022-04-28 ENCOUNTER — LAB VISIT (OUTPATIENT)
Dept: LAB | Facility: HOSPITAL | Age: 57
End: 2022-04-28
Attending: INTERNAL MEDICINE
Payer: COMMERCIAL

## 2022-04-28 ENCOUNTER — TELEPHONE (OUTPATIENT)
Dept: TRANSPLANT | Facility: CLINIC | Age: 57
End: 2022-04-28
Payer: COMMERCIAL

## 2022-04-28 ENCOUNTER — OFFICE VISIT (OUTPATIENT)
Dept: RHEUMATOLOGY | Facility: CLINIC | Age: 57
End: 2022-04-28
Payer: COMMERCIAL

## 2022-04-28 VITALS
WEIGHT: 144.81 LBS | DIASTOLIC BLOOD PRESSURE: 92 MMHG | BODY MASS INDEX: 22.73 KG/M2 | HEART RATE: 75 BPM | SYSTOLIC BLOOD PRESSURE: 138 MMHG | HEIGHT: 67 IN | TEMPERATURE: 99 F

## 2022-04-28 DIAGNOSIS — J84.9 ILD (INTERSTITIAL LUNG DISEASE): Primary | ICD-10-CM

## 2022-04-28 DIAGNOSIS — I73.01 RAYNAUD'S DISEASE WITH GANGRENE: ICD-10-CM

## 2022-04-28 DIAGNOSIS — Z79.899 ENCOUNTER FOR LONG TERM USE OF MYCOPHENOLATE MOFETIL: ICD-10-CM

## 2022-04-28 DIAGNOSIS — J84.9 ILD (INTERSTITIAL LUNG DISEASE): ICD-10-CM

## 2022-04-28 DIAGNOSIS — I73.01 RAYNAUD'S DISEASE WITH GANGRENE: Primary | ICD-10-CM

## 2022-04-28 DIAGNOSIS — M32.9 SYSTEMIC LUPUS ERYTHEMATOSUS, UNSPECIFIED SLE TYPE, UNSPECIFIED ORGAN INVOLVEMENT STATUS: ICD-10-CM

## 2022-04-28 LAB
BILIRUB UR QL STRIP: NEGATIVE
CLARITY UR REFRACT.AUTO: ABNORMAL
COLOR UR AUTO: YELLOW
CREAT UR-MCNC: 64 MG/DL (ref 15–325)
GLUCOSE UR QL STRIP: NEGATIVE
HGB UR QL STRIP: NEGATIVE
KETONES UR QL STRIP: NEGATIVE
LEUKOCYTE ESTERASE UR QL STRIP: NEGATIVE
NITRITE UR QL STRIP: NEGATIVE
PH UR STRIP: 6 [PH] (ref 5–8)
PROT UR QL STRIP: NEGATIVE
PROT UR-MCNC: <7 MG/DL (ref 0–15)
PROT/CREAT UR: NORMAL MG/G{CREAT} (ref 0–0.2)
SP GR UR STRIP: 1.01 (ref 1–1.03)
URN SPEC COLLECT METH UR: ABNORMAL

## 2022-04-28 PROCEDURE — 1159F MED LIST DOCD IN RCRD: CPT | Mod: CPTII,S$GLB,, | Performed by: INTERNAL MEDICINE

## 2022-04-28 PROCEDURE — 81003 URINALYSIS AUTO W/O SCOPE: CPT | Performed by: INTERNAL MEDICINE

## 2022-04-28 PROCEDURE — 99214 OFFICE O/P EST MOD 30 MIN: CPT | Mod: PBBFAC | Performed by: INTERNAL MEDICINE

## 2022-04-28 PROCEDURE — 3075F SYST BP GE 130 - 139MM HG: CPT | Mod: CPTII,S$GLB,, | Performed by: INTERNAL MEDICINE

## 2022-04-28 PROCEDURE — 3008F BODY MASS INDEX DOCD: CPT | Mod: CPTII,S$GLB,, | Performed by: INTERNAL MEDICINE

## 2022-04-28 PROCEDURE — 84156 ASSAY OF PROTEIN URINE: CPT | Performed by: INTERNAL MEDICINE

## 2022-04-28 PROCEDURE — 99215 OFFICE O/P EST HI 40 MIN: CPT | Mod: S$GLB,,, | Performed by: INTERNAL MEDICINE

## 2022-04-28 PROCEDURE — 99999 PR PBB SHADOW E&M-EST. PATIENT-LVL IV: ICD-10-PCS | Mod: PBBFAC,,, | Performed by: INTERNAL MEDICINE

## 2022-04-28 PROCEDURE — 99999 PR PBB SHADOW E&M-EST. PATIENT-LVL IV: CPT | Mod: PBBFAC,,, | Performed by: INTERNAL MEDICINE

## 2022-04-28 PROCEDURE — 3080F PR MOST RECENT DIASTOLIC BLOOD PRESSURE >= 90 MM HG: ICD-10-PCS | Mod: CPTII,S$GLB,, | Performed by: INTERNAL MEDICINE

## 2022-04-28 PROCEDURE — 99215 PR OFFICE/OUTPT VISIT, EST, LEVL V, 40-54 MIN: ICD-10-PCS | Mod: S$GLB,,, | Performed by: INTERNAL MEDICINE

## 2022-04-28 PROCEDURE — 3075F PR MOST RECENT SYSTOLIC BLOOD PRESS GE 130-139MM HG: ICD-10-PCS | Mod: CPTII,S$GLB,, | Performed by: INTERNAL MEDICINE

## 2022-04-28 PROCEDURE — 3080F DIAST BP >= 90 MM HG: CPT | Mod: CPTII,S$GLB,, | Performed by: INTERNAL MEDICINE

## 2022-04-28 PROCEDURE — 1159F PR MEDICATION LIST DOCUMENTED IN MEDICAL RECORD: ICD-10-PCS | Mod: CPTII,S$GLB,, | Performed by: INTERNAL MEDICINE

## 2022-04-28 PROCEDURE — 3008F PR BODY MASS INDEX (BMI) DOCUMENTED: ICD-10-PCS | Mod: CPTII,S$GLB,, | Performed by: INTERNAL MEDICINE

## 2022-04-28 NOTE — TELEPHONE ENCOUNTER
"Received the plan of care for the ALD referral from Dr. Katz.   Will send the patient a [employer] message to discuss the plan.    4/29/22: Communication re: the ALD referral completed with the patient via [employer] messages. Her appointments have been scheduled for 5/5/22.      ----- Message from Tamara Katz DO sent at 4/28/2022  2:36 PM CDT -----  Regarding: RE: ALD Referral  (sent to you)  Appropriate for ALD with PFTs and 6MWT.  Thanks    ----- Message -----  From: Pat Lima RN  Sent: 4/28/2022  10:49 AM CDT  To: Tamara Katz DO  Subject: ALD Referral  (sent to you)                      Advanced Lung Disease (ALD) Clinic Referral Note    Referral from: Dr. Johnson    Lung diagnosis: SLE - ILD    Age: 57 y.o.    Height/Weight/BMI:  5' 7" / 65.7 kg / 22.69 kg/m²    Smoking history: Social History    Tobacco Use      Smoking status: Never Smoker      Smokeless tobacco: Never Used    PFT date: none    6 MWT date: none       Chest CT date: 02/10/2021  Impression:  1. There are subpleural reticular opacities and traction bronchiectasis, most prominent in the basilar segments of the left lower lobe and to a lesser extent in the basilar segments of the right lower lobe.  Honeycombing is present in the left lower lobe.  Findings suggest interstitial lung disease, likely UIP versus NSIP.  2. Scattered bilateral pulmonary nodules measuring up to 0.6 cm.  For multiple solid nodules with any 6 mm or greater, Fleischner Society guidelines recommend follow up with non-contrast chest CT at 3-6 months (doing 05/10/2021 and 08/10/2021) and 18-24 months (between 08/10/2022 and 02/10/2023) after discovery.  3. Subcentimeter densely calcified granuloma in the posterior basal segment of the left lower lobe reflects remote infection.    Echo date: 02/06/2021    Impression:  · Tachycardia was present during the study  · The left ventricle is normal in size with concentric remodeling and hyperdynamic systolic " function. The estimated ejection fraction is 75%  · Normal left ventricular diastolic function.  · Normal right ventricular size with normal right ventricular systolic function.  · The estimated PA systolic pressure is 21 mmHg.  · Normal central venous pressure (3 mmHg).    Other medical information: MMF 1500 mg BID       Recommendations?

## 2022-04-28 NOTE — PROGRESS NOTES
Subjective:       Patient ID: Christen Bowman is a 55 y.o. female.    Chief Complaint: No chief complaint on file.    HPI 55 year old F with PMH of HTN, hypothyroidism, SLE, RA here to establish care. She was diagnosed with SLE in 1996  when she presented with +KENNY, arthritis , rash, raynauds,pleurisy (pericarditis),  and thrombocytopenia.  She reports she came in to hospital due to chest pain and pain/swelling in hands and elbows.  She was told to have pericardial effusion.   She was hospitalized for thrombocytopenia around age 22 and was treated with high dose steroids and Iv IG/plasmapheresis. She required splenectomy in   Due to thrombocytopenia.  She had one first trimester pregnancy loss. She was initially put on plaquenil when she was diagnosed but it was stopped at some point.        About a year ago (1/2020), she developed aphasia/confusion and had CVA. She was treated with high doses of steroids and Iv IG but no anticoagulation.  Since the stroke, she is more forgetful.        She as recently hospitalized in outside hospital for arthritis, raynauds causing ulcerations with digital ischemia and bacteremia.   It appears on December 12,2020, she developed URI symptoms (covid test-negative). She was having sinus drainage. She went to urgent care.  She went home and the next day,she noticed raynauds in hands and feet.  Within a matter of hours, she developed the blisters in lower extremities.   She was noted to have platelets of 5000 and creat 1.6 (baseline 0.64). White count had increased to 30 thousand. During admission, she developed severe ulcerations in her lower legs and toes and gangrene developed in toes. She was treated with high doses of steroids. She was also treated with antibiotics for gram positive cocci in blood cultures. Apparently, she also had bone marrow biopsy which did not show any significant changes and  BALDO did not show endocarditis.  During hospitalization, patient developed worsening  ulcerations and gangrene of multiple toes. She had lower extremity US which did not show DVT and was not put on anticoagulation. She was discharged on prednisone 80mg daily, mtx 10mg po week, diltiazem 360mg ER, nystatin, tylenol-3, estradiol and HCTZ 25mg poqday.        She is on clindamycin 300mg po q8 hours , prednisone 10mg a day, MTX 4 pills once a week, folic acid, oxycodone 10/325mg po qhs. She saw Dr. Yash Santana about 2 weeks ago. At that time, she was put on clindamycin. She saw orthopedic who is considering amputation.  Denies fevers, pleurisy, sob, alopecia. Reports raynauds in hands and feet.    Interval history: She has not been in the hospital. She is off prednisone.  She is taking cellcept 1500mg po BID.She is on sildenafil TID. She is taking nifedipene  60mg po qhs..  She is taking plaquenil 200mg po BID. Denies any rashes, oral ulcers, hair loss, joint pain or swelling.    Past Medical History:   Diagnosis Date    Raynaud's disease     Rheumatoid arteritis     Systemic lupus erythematosus    ,  Review of Systems   Constitutional: Negative for activity change, appetite change, chills, diaphoresis, fatigue and fever.   HENT: Negative for congestion, ear discharge, ear pain, facial swelling, mouth sores, sinus pressure, sneezing, sore throat, tinnitus and trouble swallowing.    Eyes: Negative for photophobia, pain, discharge, redness, itching and visual disturbance.   Respiratory: Negative for apnea, chest tightness, shortness of breath, wheezing and stridor.    Cardiovascular: Negative for leg swelling.   Gastrointestinal: Negative for abdominal distention, abdominal pain, anal bleeding, blood in stool, constipation, diarrhea and nausea.   Endocrine: Negative for cold intolerance and heat intolerance.   Genitourinary: Negative for difficulty urinating and dysuria.   Musculoskeletal: Negative for arthralgias, back pain, gait problem, joint swelling, myalgias, neck pain and neck stiffness.   Skin:  Negative for color change, pallor, rash and wound.   Neurological: Negative for dizziness, seizures, light-headedness and numbness.   Hematological: Negative for adenopathy. Does not bruise/bleed easily.   Psychiatric/Behavioral: Negative for sleep disturbance. The patient is not nervous/anxious.              Objective:   There were no vitals taken for this visit.   BP: 142/98  Physical Exam   Constitutional: She is oriented to person, place, and time.   HENT:   Head: Normocephalic and atraumatic.   Right Ear: External ear normal.   Left Ear: External ear normal.   Nose: Nose normal.   Mouth/Throat: Oropharynx is clear and moist. No oropharyngeal exudate.   Eyes: Conjunctivae and EOM are normal. Pupils are equal, round, and reactive to light. Right eye exhibits no discharge. Left eye exhibits no discharge. No scleral icterus.   Neck: No JVD present. No thyromegaly present.   Cardiovascular: Normal rate, regular rhythm, normal heart sounds and intact distal pulses.  Exam reveals no gallop and no friction rub.    No murmur heard.  Pulmonary/Chest: Effort normal and breath sounds normal. No respiratory distress. She has no wheezes. She has no rales. She exhibits no tenderness.   Abdominal: Soft. Bowel sounds are normal. She exhibits no distension and no mass. There is no abdominal tenderness. There is no rebound and no guarding.   Lymphadenopathy:     She has no cervical adenopathy.   Neurological: She is alert and oriented to person, place, and time. No cranial nerve deficit. Gait normal. Coordination normal.   Skin: Skin is dry. No rash noted. No erythema. No pallor.     Psychiatric: Affect and judgment normal.   Musculoskeletal: Tenderness and edema present. No deformity.      Comments: Gangrenous digits in left foot;   Dark Discoloration in second through fourth toes on right    Lower shins wrapped      labs: 3/3/2021  Wbc-12.6  Hematocrit-37  Plate-389  Labs 2/3/21  cmp-wnl  No data to display     Assessment:      57 year old F with PMH of HTN, hypothyroidism, SLE, RA here for follow up of SLE. She was diagnosed with SLE in 1996  when she presented with +KENNY,+dsdna,  arthritis , rash, raynauds,pleurisy (pericarditis),  and thrombocytopenia. She was recently hospitalized for severe raynauds leading to gangrene in right foot and is s/p partial amputation 3/30/2021.  During that hospitalization, she was noted to have  abnormal CT chest concerning for ILD.    Given her history of CVA, first trimester pregnancy loss, and progressive gangrene, patient was admitted for anticoagulation for suspected APS but APS work up was negative.  She was admitted from 2/5 to 2/11/21 for  epoprostenol for 5 days and pulse dose steroids.    SLE:  +KENNY,+dsdna,  arthritis , rash, raynauds,pleurisy (pericarditis), thrombocytopenia, severe raynauds requiring amputation.  #raynauds: doing much better.  Continue nifedipine 60mg poq day (takes 30mg tablets)  Continue sildenafil 20mg po TID  Continue cellcept 1500mg po BID  Continue plaquenil twice a day  Continue aspirin 81 mg po qday  Consider benylsta injections if needed.  LABS today      #ILD: noted on CT chest. Denies symptoms  -cellcept as above  Pulmonary consult placed     40 * minutes of total time spent on the encounter, which includes face to face time and non-face to face time preparing to see the patient (eg, review of tests), Obtaining and/or reviewing separately obtained history, Documenting clinical information in the electronic or other health record, Independently interpreting results (not separately reported) and communicating results to the patient/family/caregiver, or Care coordination (not separately reported).

## 2022-04-28 NOTE — PROGRESS NOTES
Answers for HPI/ROS submitted by the patient on 4/28/2022  fever: No  eye redness: No  mouth sores: No  headaches: No  shortness of breath: No  chest pain: No  trouble swallowing: No  diarrhea: No  constipation: No  unexpected weight change: No  genital sore: No  dysuria: No  During the last 3 days, have you had a skin rash?: No  Bruises or bleeds easily: No  cough: No

## 2022-05-04 ENCOUNTER — DOCUMENTATION ONLY (OUTPATIENT)
Dept: TRANSPLANT | Facility: CLINIC | Age: 57
End: 2022-05-04
Payer: COMMERCIAL

## 2022-05-05 ENCOUNTER — HOSPITAL ENCOUNTER (OUTPATIENT)
Dept: PULMONOLOGY | Facility: CLINIC | Age: 57
Discharge: HOME | End: 2022-05-05
Payer: COMMERCIAL

## 2022-05-05 ENCOUNTER — PATIENT MESSAGE (OUTPATIENT)
Dept: RHEUMATOLOGY | Facility: CLINIC | Age: 57
End: 2022-05-05
Payer: COMMERCIAL

## 2022-05-05 ENCOUNTER — OFFICE VISIT (OUTPATIENT)
Dept: TRANSPLANT | Facility: CLINIC | Age: 57
End: 2022-05-05
Payer: COMMERCIAL

## 2022-05-05 VITALS — BODY MASS INDEX: 22.85 KG/M2 | HEIGHT: 66 IN | WEIGHT: 142.19 LBS

## 2022-05-05 VITALS
TEMPERATURE: 96 F | HEIGHT: 66 IN | WEIGHT: 141.13 LBS | BODY MASS INDEX: 22.68 KG/M2 | OXYGEN SATURATION: 99 % | SYSTOLIC BLOOD PRESSURE: 162 MMHG | RESPIRATION RATE: 20 BRPM | HEART RATE: 71 BPM | DIASTOLIC BLOOD PRESSURE: 94 MMHG

## 2022-05-05 DIAGNOSIS — J84.9 ILD (INTERSTITIAL LUNG DISEASE): Primary | ICD-10-CM

## 2022-05-05 DIAGNOSIS — I73.01 RAYNAUD'S DISEASE WITH GANGRENE: ICD-10-CM

## 2022-05-05 DIAGNOSIS — J84.9 ILD (INTERSTITIAL LUNG DISEASE): ICD-10-CM

## 2022-05-05 DIAGNOSIS — M32.13 OTHER SYSTEMIC LUPUS ERYTHEMATOSUS WITH LUNG INVOLVEMENT: ICD-10-CM

## 2022-05-05 LAB
DLCO ADJ PRE: 14.86 ML/(MIN*MMHG) (ref 18.98–30.45)
DLCO SINGLE BREATH LLN: 18.98
DLCO SINGLE BREATH PRE REF: 62.7 %
DLCO SINGLE BREATH REF: 24.72
DLCOC SBVA LLN: 3.29
DLCOC SBVA PRE REF: 90.4 %
DLCOC SBVA REF: 4.69
DLCOC SINGLE BREATH LLN: 18.98
DLCOC SINGLE BREATH PRE REF: 60.1 %
DLCOC SINGLE BREATH REF: 24.72
DLCOCSBVAULN: 6.09
DLCOCSINGLEBREATHULN: 30.45
DLCOSINGLEBREATHULN: 30.45
DLCOVA LLN: 3.29
DLCOVA PRE REF: 94.3 %
DLCOVA PRE: 4.42 ML/(MIN*MMHG*L) (ref 3.29–6.09)
DLCOVA REF: 4.69
DLCOVAULN: 6.09
DLVAADJ PRE: 4.23 ML/(MIN*MMHG*L) (ref 3.29–6.09)
ERV LLN: -16449.1
ERV PRE REF: 91.9 %
ERV REF: 0.9
ERVULN: ABNORMAL
FEF 25 75 LLN: 1.06
FEF 25 75 PRE REF: 54.6 %
FEF 25 75 REF: 2.3
FEV05 LLN: 1.15
FEV05 REF: 2.01
FEV1 FVC LLN: 69
FEV1 FVC PRE REF: 91.8 %
FEV1 FVC REF: 80
FEV1 LLN: 1.8
FEV1 PRE REF: 79.8 %
FEV1 REF: 2.42
FRCPLETH LLN: 1.99
FRCPLETH PREREF: 82.4 %
FRCPLETH REF: 2.81
FRCPLETHULN: 3.63
FVC LLN: 2.29
FVC PRE REF: 86.5 %
FVC REF: 3.04
IVC PRE: 2.4 L (ref 2.29–3.83)
IVC SINGLE BREATH LLN: 2.29
IVC SINGLE BREATH PRE REF: 78.9 %
IVC SINGLE BREATH REF: 3.04
IVCSINGLEBREATHULN: 3.83
LLN IC: -16447.68
PEF LLN: 4.12
PEF PRE REF: 72.8 %
PEF REF: 6.25
PHYSICIAN COMMENT: ABNORMAL
PRE DLCO: 15.5 ML/(MIN*MMHG) (ref 18.98–30.45)
PRE ERV: 0.82 L (ref -16449.1–16450.9)
PRE FEF 25 75: 1.26 L/S (ref 1.06–4.02)
PRE FET 100: 6.41 SEC
PRE FEV05 REF: 72.2 %
PRE FEV1 FVC: 73.53 % (ref 68.93–89.59)
PRE FEV1: 1.93 L (ref 1.8–3.02)
PRE FEV5: 1.45 L (ref 1.15–2.86)
PRE FRC PL: 2.31 L (ref 1.99–3.63)
PRE FVC: 2.63 L (ref 2.29–3.83)
PRE IC: 1.81 L (ref -16447.68–16452.32)
PRE PEF: 4.55 L/S (ref 4.12–8.38)
PRE REF IC: 78 %
PRE RV: 1.49 L (ref 1.34–2.49)
PRE TLC: 4.12 L (ref 4.29–6.26)
PRE VTG: 2.39 L
RAW PRE REF: 72.2 %
RAW PRE: 2.21 CMH2O*S/L (ref 3.06–3.06)
RAW REF: 3.06
REF IC: 2.32
RV LLN: 1.34
RV PRE REF: 77.9 %
RV REF: 1.91
RVTLC LLN: 28
RVTLC PRE REF: 96.1 %
RVTLC PRE: 36.18 % (ref 28.07–47.25)
RVTLC REF: 38
RVTLCULN: 47
RVULN: 2.49
SGAW PRE REF: 159.1 %
SGAW PRE: 0.16 1/(CMH2O*S) (ref 0.1–0.1)
SGAW REF: 0.1
TLC LLN: 4.29
TLC PRE REF: 78.2 %
TLC REF: 5.27
TLC ULN: 6.26
ULN IC: ABNORMAL
VA PRE: 3.51 L (ref 5.12–5.12)
VA SINGLE BREATH PRE REF: 68.5 %
VA SINGLE BREATH REF: 5.12
VC LLN: 2.29
VC PRE REF: 86.5 %
VC PRE: 2.63 L (ref 2.29–3.83)
VC REF: 3.04
VC ULN: 3.83

## 2022-05-05 PROCEDURE — 3077F PR MOST RECENT SYSTOLIC BLOOD PRESSURE >= 140 MM HG: ICD-10-PCS | Mod: CPTII,NTX,S$GLB, | Performed by: INTERNAL MEDICINE

## 2022-05-05 PROCEDURE — 1159F PR MEDICATION LIST DOCUMENTED IN MEDICAL RECORD: ICD-10-PCS | Mod: CPTII,NTX,S$GLB, | Performed by: INTERNAL MEDICINE

## 2022-05-05 PROCEDURE — 3077F SYST BP >= 140 MM HG: CPT | Mod: CPTII,NTX,S$GLB, | Performed by: INTERNAL MEDICINE

## 2022-05-05 PROCEDURE — 94010 BREATHING CAPACITY TEST: ICD-10-PCS | Mod: NTX,S$GLB,, | Performed by: INTERNAL MEDICINE

## 2022-05-05 PROCEDURE — 3080F PR MOST RECENT DIASTOLIC BLOOD PRESSURE >= 90 MM HG: ICD-10-PCS | Mod: CPTII,NTX,S$GLB, | Performed by: INTERNAL MEDICINE

## 2022-05-05 PROCEDURE — 3008F BODY MASS INDEX DOCD: CPT | Mod: CPTII,NTX,S$GLB, | Performed by: INTERNAL MEDICINE

## 2022-05-05 PROCEDURE — 99204 PR OFFICE/OUTPT VISIT, NEW, LEVL IV, 45-59 MIN: ICD-10-PCS | Mod: 25,NTX,S$GLB, | Performed by: INTERNAL MEDICINE

## 2022-05-05 PROCEDURE — 94729 PR C02/MEMBANE DIFFUSE CAPACITY: ICD-10-PCS | Mod: NTX,S$GLB,, | Performed by: INTERNAL MEDICINE

## 2022-05-05 PROCEDURE — 94010 BREATHING CAPACITY TEST: CPT | Mod: NTX,S$GLB,, | Performed by: INTERNAL MEDICINE

## 2022-05-05 PROCEDURE — 3008F PR BODY MASS INDEX (BMI) DOCUMENTED: ICD-10-PCS | Mod: CPTII,NTX,S$GLB, | Performed by: INTERNAL MEDICINE

## 2022-05-05 PROCEDURE — 94729 DIFFUSING CAPACITY: CPT | Mod: NTX,S$GLB,, | Performed by: INTERNAL MEDICINE

## 2022-05-05 PROCEDURE — 94726 PULM FUNCT TST PLETHYSMOGRAP: ICD-10-PCS | Mod: NTX,S$GLB,, | Performed by: INTERNAL MEDICINE

## 2022-05-05 PROCEDURE — 99999 PR PBB SHADOW E&M-EST. PATIENT-LVL V: CPT | Mod: PBBFAC,TXP,, | Performed by: INTERNAL MEDICINE

## 2022-05-05 PROCEDURE — 3080F DIAST BP >= 90 MM HG: CPT | Mod: CPTII,NTX,S$GLB, | Performed by: INTERNAL MEDICINE

## 2022-05-05 PROCEDURE — 1159F MED LIST DOCD IN RCRD: CPT | Mod: CPTII,NTX,S$GLB, | Performed by: INTERNAL MEDICINE

## 2022-05-05 PROCEDURE — 99999 PR PBB SHADOW E&M-EST. PATIENT-LVL V: ICD-10-PCS | Mod: PBBFAC,TXP,, | Performed by: INTERNAL MEDICINE

## 2022-05-05 PROCEDURE — 1160F RVW MEDS BY RX/DR IN RCRD: CPT | Mod: CPTII,NTX,S$GLB, | Performed by: INTERNAL MEDICINE

## 2022-05-05 PROCEDURE — 1160F PR REVIEW ALL MEDS BY PRESCRIBER/CLIN PHARMACIST DOCUMENTED: ICD-10-PCS | Mod: CPTII,NTX,S$GLB, | Performed by: INTERNAL MEDICINE

## 2022-05-05 PROCEDURE — 99204 OFFICE O/P NEW MOD 45 MIN: CPT | Mod: 25,NTX,S$GLB, | Performed by: INTERNAL MEDICINE

## 2022-05-05 PROCEDURE — 94618 PULMONARY STRESS TESTING: ICD-10-PCS | Mod: NTX,S$GLB,, | Performed by: INTERNAL MEDICINE

## 2022-05-05 PROCEDURE — 94618 PULMONARY STRESS TESTING: CPT | Mod: NTX,S$GLB,, | Performed by: INTERNAL MEDICINE

## 2022-05-05 PROCEDURE — 94726 PLETHYSMOGRAPHY LUNG VOLUMES: CPT | Mod: NTX,S$GLB,, | Performed by: INTERNAL MEDICINE

## 2022-05-05 NOTE — PROCEDURES
Christen Bowman is a 57 y.o.  female patient, who presents for a 6 minute walk test ordered by DO Sterling.  The diagnosis is Interstitial Lung Disease.  The patient's BMI is 23 kg/m2.  Predicted distance (lower limit of normal) is 413.83 meters.      Test Results:    The test was completed without stopping.  The total time walked was 360 seconds.  During walking, the patient reported:  Leg pain.  The patient used no assistive devices during testing.     05/05/2022---------Distance: 487.68 meters (1600 feet)     O2 Sat % Supplemental Oxygen Heart Rate Blood Pressure Dann Scale   Pre-exercise  (Resting) 98 % Room Air 79 bpm 141/85 mmHg 0   During Exercise 98 % Room Air 108 bpm 192/96 mmHg 3   Post-exercise  (Recovery) 99 % Room Air  83 bpm 153/82 mmHg      Recovery Time: 197 seconds    Performing nurse/tech: Francois VICENTE      PREVIOUS STUDY:   The patient has not had a previous study.      CLINICAL INTERPRETATION:  Six minute walk distance is 487.68 meters (1600 feet) with moderate dyspnea.  During exercise, there was no desaturation while breathing room air.  Both blood pressure and heart rate increased significantly with walking.  This may represent a hypertensive response to exercise.  The patient reported non-pulmonary symptoms during exercise.  No previous study performed.  Based upon age and body mass index, exercise capacity is normal.

## 2022-05-05 NOTE — PROGRESS NOTES
ADVANCED LUNG DISEASE CLINIC INITIAL EVALUATION                                                                                                                                           Reason for Visit:  SLE with lung involvement, ILD    Referring Physician: Laurel Johnson MD    History of Present Illness: Christen Bowman is a 57 y.o. female who is on 0L of oxygen.  She is on no assisted ventilation.  Her New York Heart Association Class is I and a Karnofsky score of 90% - Able to carry on normal activity: minor symptoms of disease. She is not diabetic.     55 year old F with PMH of HTN, hypothyroidism, SLE, RA with history of gangrene here for evaluation of abnormal chest CT.  She was diagnosed with SLE in 1996 when she presented with +KENNY, arthritis , rash, raynauds,pleurisy (pericarditis),  and thrombocytopenia.  She reports she came in to hospital due to chest pain and pain/swelling in hands and elbows.  She was told to have pericardial effusion. She was hospitalized for thrombocytopenia around age 22 and was treated with high dose steroids and Iv IG/plasmapheresis. She required splenectomy due to thrombocytopenia.  She reports that she went into remission around 2002 and had no issues until January 2020 when she developed aphasia/confusion and had CVA. She was treated with high doses of steroids and Iv IG but no anticoagulation.  She was in and out of the hospital for arthritis, raynauds causing ulcerations with digital ischemia and bacteremia. Around December 2020, she developed URI symptoms (covid test-negative). She was having sinus drainage. She went to urgent care.  She went home and the next day,she noticed raynauds in hands and feet. Within a matter of hours, she developed the blisters in lower extremities. She was noted to have platelets of 5000 and creat 1.6 (baseline 0.64). White count had increased to 30 thousand. During admission, she developed severe ulcerations in her lower legs and toes and  gangrene developed in toes. She was treated with high doses of steroids. She was also treated with antibiotics for gram positive cocci in blood cultures. During hospitalization, patient developed worsening ulcerations and gangrene of multiple toes. She was discharged on prednisone.     Currently she is on cellcept, sildenafil, and plaquenil.  Denies fevers, pleurisy, sob, or cough. Reports raynauds in hands and feet. Compliant with all medications.  Remains active with no respiratory symptoms.       Past Medical History:   Diagnosis Date    Essential hypertension 12/23/2019 2:02:07 PM    [facility] Historical - Cardiovascular: Hypertension-No Additional Notes    Essential hypertension 12/23/2019 2:02:07 PM    [facility] Historical - Cardiovascular: Hypertension-No Additional Notes    Raynaud's disease     Rheumatoid arteritis     Systemic lupus erythematosus        Past Surgical History:   Procedure Laterality Date    SPLENECTOMY      TONSILLECTOMY      TUBAL LIGATION         Allergies: Patient has no known allergies.    Current Outpatient Medications   Medication Sig    aspirin (ECOTRIN) 81 MG EC tablet Take 81 mg by mouth once daily.    diclofenac sodium (VOLTAREN) 1 % Gel Apply 2 g topically 4 (four) times daily.    estradioL (ESTRACE) 1 MG tablet TAKE ONE TABLET BY MOUTH EVERY DAY    hydrOXYchloroQUINE (PLAQUENIL) 200 mg tablet Take 1 tablet (200 mg total) by mouth 2 (two) times daily.    levothyroxine (SYNTHROID) 50 MCG tablet Take 1 tablet (50 mcg total) by mouth every morning.    mycophenolate (CELLCEPT) 500 mg Tab Take 3 tablets (1,500 mg total) by mouth 2 (two) times daily.    NIFEdipine (ADALAT CC) 30 MG TbSR Take 2 tablets (60 mg total) by mouth once daily.    pregabalin (LYRICA ORAL) Take 150 mg by mouth 2 (two) times a day.    sildenafil (REVATIO) 20 mg Tab Take 1 tablet (20 mg total) by mouth 3 (three) times daily.    amLODIPine (NORVASC) 10 MG tablet Take 1 tablet (10 mg total)  "by mouth once daily.    blood sugar diagnostic Strp Checking blood glucose 4 times daily.    blood-glucose meter Misc Use as instructed.    collagenase (SANTYL) ointment Apply topically once daily. (Patient not taking: Reported on 2022)    diclofenac sodium (VOLTAREN) 1 % Gel Apply 1 Dose topically.    folic acid (FOLVITE) 1 MG tablet Take 1 tablet by mouth once daily.    gabapentin (NEURONTIN) 300 MG capsule SMARTSI Capsule(s) By Mouth Every Evening    lancets 33 gauge Misc Test 4 (four) times daily before meals and nightly.    oxyCODONE-acetaminophen (PERCOCET)  mg per tablet Take 1 tablet by mouth every 24 hours as needed for Pain.    pantoprazole (PROTONIX) 40 MG tablet Take 1 tablet (40 mg total) by mouth once daily.    pen needle, diabetic 32 gauge x 5/32" Ndle Inject 4 times daily.    predniSONE (DELTASONE) 20 MG tablet Take 60 mg by mouth once daily.    predniSONE (DELTASONE) 5 MG tablet Take 1 tablet (5 mg total) by mouth once daily.    progesterone (PROMETRIUM) 200 MG capsule TAKE ONE CAPSULE BY MOUTH EVERY DAY (Patient taking differently: Take 200 mg by mouth every evening.)    sulfamethoxazole-trimethoprim 800-160mg (BACTRIM DS) 800-160 mg Tab Take 1 tablet by mouth every Mon, Wed, Fri.     No current facility-administered medications for this visit.       Immunization History   Administered Date(s) Administered    COVID-19, MRNA, LN-S, PF (Pfizer) (Gray Cap) 2022    COVID-19, MRNA, LN-S, PF (Pfizer) (Purple Cap) 2021, 2021    Influenza - Quadrivalent - PF *Preferred* (6 months and older) 2013, 10/17/2014, 2021    Pneumococcal Polysaccharide - 23 Valent 10/17/2014     Family History:    Family History   Problem Relation Age of Onset    Hypertension Mother     Lupus Mother     Cancer Mother     Hypertension Father     Anuerysm Father      Social History     Substance and Sexual Activity   Alcohol Use Never      Social History     Substance " "and Sexual Activity   Drug Use Not on file      Social History     Socioeconomic History    Marital status:    Tobacco Use    Smoking status: Never Smoker    Smokeless tobacco: Never Used   Substance and Sexual Activity    Alcohol use: Never     Review of Systems   Constitutional: Negative.  Negative for chills, diaphoresis, fever, malaise/fatigue and weight loss.   Respiratory: Negative for cough, hemoptysis, sputum production and wheezing.    Musculoskeletal: Positive for joint pain and myalgias.     Vitals  BP (!) 162/94   Pulse 71   Temp 96.4 °F (35.8 °C) (Oral)   Resp 20   Ht 5' 6" (1.676 m)   Wt 64 kg (141 lb 1.5 oz)   SpO2 99% Comment: room air  BMI 22.77 kg/m²   Physical Exam  Constitutional:       Appearance: Normal appearance.   HENT:      Head: Normocephalic and atraumatic.   Eyes:      Extraocular Movements: Extraocular movements intact.      Conjunctiva/sclera: Conjunctivae normal.   Cardiovascular:      Rate and Rhythm: Normal rate.   Pulmonary:      Effort: Pulmonary effort is normal. No respiratory distress.      Breath sounds: Normal breath sounds. No stridor. No wheezing, rhonchi or rales.   Chest:      Chest wall: No tenderness.   Abdominal:      Palpations: Abdomen is soft.   Neurological:      Mental Status: She is alert and oriented to person, place, and time.   Psychiatric:         Mood and Affect: Mood normal.         Labs:  Hospital Outpatient Visit on 05/05/2022   Component Date Value    Pre FVC 05/05/2022 2.63     PRE FEV5 05/05/2022 1.45     Pre FEV1 05/05/2022 1.93     Pre FEV1 FVC 05/05/2022 73.53     Pre FEF 25 75 05/05/2022 1.26     Pre PEF 05/05/2022 4.55     Pre  05/05/2022 6.41     Pre DLCO 05/05/2022 15.50 (A)    DLCO ADJ PRE 05/05/2022 14.86 (A)    DLCOVA PRE 05/05/2022 4.42     DLVAAdj PRE 05/05/2022 4.23     VA PRE 05/05/2022 3.51 (A)    IVC PRE 05/05/2022 2.40     Pre TLC 05/05/2022 4.12 (A)    VC PRE 05/05/2022 2.63     PRE IC " 05/05/2022 1.81     Pre FRC PL 05/05/2022 2.31     Pre ERV 05/05/2022 0.82     Pre RV 05/05/2022 1.49     RVTLC PRE 05/05/2022 36.18     Raw PRE 05/05/2022 2.21 (A)    sGaw PRE 05/05/2022 0.16 (A)    Pre VTG 05/05/2022 2.39     FVC Ref 05/05/2022 3.04     FVC LLN 05/05/2022 2.29     FVC Pre Ref 05/05/2022 86.5     FEV05 REF 05/05/2022 2.01     FEV05 LLN 05/05/2022 1.15     PRE FEV05 REF 05/05/2022 72.2     FEV1 Ref 05/05/2022 2.42     FEV1 LLN 05/05/2022 1.80     FEV1 Pre Ref 05/05/2022 79.8     FEV1 FVC Ref 05/05/2022 80     FEV1 FVC LLN 05/05/2022 69     FEV1 FVC Pre Ref 05/05/2022 91.8     FEF 25 75 Ref 05/05/2022 2.30     FEF 25 75 LLN 05/05/2022 1.06     FEF 25 75 Pre Ref 05/05/2022 54.6     PEF Ref 05/05/2022 6.25     PEF LLN 05/05/2022 4.12     PEF Pre Ref 05/05/2022 72.8     TLC Ref 05/05/2022 5.27     TLC LLN 05/05/2022 4.29     TLC ULN 05/05/2022 6.26     TLC Pre Ref 05/05/2022 78.2     VC Ref 05/05/2022 3.04     VC LLN 05/05/2022 2.29     VC ULN 05/05/2022 3.83     VC Pre Ref 05/05/2022 86.5     REF IC 05/05/2022 2.32     LLN IC 05/05/2022 -43124.68     ULN IC 05/05/2022 08528.32     PRE REF IC 05/05/2022 78.0     FRCpleth Ref 05/05/2022 2.81     FRCpleth LLN 05/05/2022 1.99     FRC PLETH ULN 05/05/2022 3.63     FRCpleth PreRef 05/05/2022 82.4     ERV Ref 05/05/2022 0.90     ERV LLN 05/05/2022 -63470.10     ERV ULN 05/05/2022 62603.90     ERV Pre Ref 05/05/2022 91.9     RV Ref 05/05/2022 1.91     RV LLN 05/05/2022 1.34     RV ULN 05/05/2022 2.49     RV Pre Ref 05/05/2022 77.9     RVTLC Ref 05/05/2022 38     RVTLC LLN 05/05/2022 28     RV TLC ULN 05/05/2022 47     RVTLC Pre Ref 05/05/2022 96.1     Raw Ref 05/05/2022 3.06     Raw Pre Ref 05/05/2022 72.2     sGaw Ref 05/05/2022 0.10     sGaw Pre Ref 05/05/2022 159.1     DLCO Single Breath Ref 05/05/2022 24.72     DLCO Single Breath LLN 05/05/2022 18.98     DLCO SINGLEBREATH ULN 05/05/2022 30.45      DLCO Single Breath Pre R* 05/05/2022 62.7     DLCOc Single Breath Ref 05/05/2022 24.72     DLCOc Single Breath LLN 05/05/2022 18.98     DLCOC SINGLEBREATH ULN 05/05/2022 30.45     DLCOc Single Breath Pre * 05/05/2022 60.1     DLCOVA Ref 05/05/2022 4.69     DLCOVA LLN 05/05/2022 3.29     DLCOVA ULN 05/05/2022 6.09     DLCOVA Pre Ref 05/05/2022 94.3     DLCOc SBVA Ref 05/05/2022 4.69     DLCOc SBVA LLN 05/05/2022 3.29     DLCOCSBVA ULN 05/05/2022 6.09     DLCOc SBVA Pre Ref 05/05/2022 90.4     VA Single Breath Ref 05/05/2022 5.12     VA Single Breath Pre Ref 05/05/2022 68.5     IVC Single Breath Ref 05/05/2022 3.04     IVC Single Breath LLN 05/05/2022 2.29     IVC SINGLEBREATH ULN 05/05/2022 3.83     IVC Single Breath Pre Ref 05/05/2022 78.9        Pulmonary Function Tests 5/5/2022   FVC 2.63   FEV1 1.93   TLC (liters) 4.12   DLCO (ml/mmHg sec) 15.5   FVC% 86.5   FEV1% 79.8   FEF 25-75 1.26   FEF 25-75% 54.6   TLC% 78.2   RV 1.49   RV% 77.9   DLCO% 15.5     6MW 5/5/2022   6MWT Status completed without stopping   Patient Reported Leg pain   Was O2 used? No   6MW Distance walked (feet) 1600   Distance walked (meters) 487.68   Did patient stop? No   Oxygen Saturation 98   Supplemental Oxygen Room Air   Heart Rate 79   Blood Pressure 141/85   Dann Dyspnea Rating  nothing at all   Oxygen Saturation 98   Supplemental Oxygen Room Air   Heart Rate 108   Blood Pressure 192/96   Dann Dyspnea Rating  moderate   Recovery Time (seconds) 197   Oxygen Saturation 99   Supplemental Oxygen Room Air   Heart Rate 83       Imaging:  Results for orders placed during the hospital encounter of 02/05/21    X-Ray Chest PA And Lateral    Narrative  EXAMINATION:  XR CHEST PA AND LATERAL    CLINICAL HISTORY:  leukocytosis;    TECHNIQUE:  PA and lateral views of the chest were performed.    COMPARISON:  None.    FINDINGS:  Mediastinal structures are midline.  Hilar contours are unremarkable.  Lung volumes are symmetric.  No  definite consolidation.  Ill-defined reticular opacities project over the left lower lung zone.  No consolidation.  No pneumothorax or large pleural effusion.  No free air beneath the diaphragm.  No acute osseous abnormalities.  Visualized soft tissues are within normal limits.    Impression  1. Ill-defined reticular opacities projecting over the left lower lung zone, possibly related to scarring.      Electronically signed by: Chan Brown MD  Date:    02/06/2021  Time:    13:05    Results for orders placed during the hospital encounter of 02/05/21    CT Chest Without Contrast    Narrative  EXAMINATION:  CT CHEST WITHOUT CONTRAST    CLINICAL HISTORY:  HRCT please. CT with Mosaic attenuation, left sided reticular opacification with fibrotic appearance and thin-walled cystic changes.  Additionally there are subcentimeter nodular opacities;    TECHNIQUE:  Low dose axial images, sagittal and coronal reformations were obtained from the thoracic inlet to the lung bases. Contrast was not administered.    X-ray dose: DLP= 161.76 mGy-cm    COMPARISON:  CTA abdomen pelvis 02/08/2021    Chest radiograph 02/06/2021    FINDINGS:  Base of Neck:  No significant abnormality.    Thoracic soft tissues:  No significant abnormality.    Aorta: Left-sided aortic arch with normal caliber, contour, and course with no significant atherosclerotic calcifications.  Common origin of the brachiocephalic and left common carotid arteries.    Heart: Normal size.  No significant coronary artery calcific atherosclerosis.    Pericardium: No pericardial fluid or pericardial calcification.    Pulmonary vasculature: Pulmonary arteries distribute normally. There are four pulmonary veins that return to the left atrium.    Xenia/Mediastinum:  No lymph node enlargement on this noncontrast CT. Hilar contours are unremarkable.    Airways:  Trachea is midline and proximal airways are patent without significant abnormality.    Pleura: No pleural thickening,  pleural calcification, or pleural fluid.  No pneumothorax.    Lungs:    -Scattered bilateral pulmonary nodules, with the largest on the right a pleural based nodule in the apical segment of the right upper lobe measuring 0.6 cm (axial series 4, image 117) and the largest on the left in the anteromedial basal segment of the left lower lobe measuring 0.3 cm (axial series 4, image 295).    -Subpleural reticular opacities and traction bronchiectasis, most prominent in the basilar segments of the left lower lobe and to a lesser extent the basilar segments of the right lower lobe.  Honeycombing is present in the left lower lobe.    -Subcentimeter densely calcified granuloma in the posterior basal segment of the left lower lobe reflects remote infection.    Esophagus: Normal in course and caliber.    Upper abdomen: No acute intra-abdominal abnormality.  Spleen is surgically absent.    Bones:  Degenerative changes of the visualized osseous structures without acute fracture or lytic or sclerotic lesions.    Impression  1. There are subpleural reticular opacities and traction bronchiectasis, most prominent in the basilar segments of the left lower lobe and to a lesser extent in the basilar segments of the right lower lobe.  Honeycombing is present in the left lower lobe.  Findings suggest interstitial lung disease, likely UIP versus NSIP.  2. Scattered bilateral pulmonary nodules measuring up to 0.6 cm.  For multiple solid nodules with any 6 mm or greater, Fleischner Society guidelines recommend follow up with non-contrast chest CT at 3-6 months (doing 05/10/2021 and 08/10/2021) and 18-24 months (between 08/10/2022 and 02/10/2023) after discovery.  3. Subcentimeter densely calcified granuloma in the posterior basal segment of the left lower lobe reflects remote infection.    Electronically signed by resident: Celso Butler  Date:    02/10/2021  Time:    11:20    Electronically signed by: Yoselyn Vasquez  MD  Date:    02/10/2021  Time:    11:41      Cardiodiagnostics:  Results for orders placed during the hospital encounter of 02/05/21    Echo Color Flow Doppler? Yes    Interpretation Summary  · Tachycardia was present during the study  · The left ventricle is normal in size with concentric remodeling and hyperdynamic systolic function. The estimated ejection fraction is 75%  · Normal left ventricular diastolic function.  · Normal right ventricular size with normal right ventricular systolic function.  · The estimated PA systolic pressure is 21 mmHg.  · Normal central venous pressure (3 mmHg).        Assessment:  1. ILD (interstitial lung disease)    2. Other systemic lupus erythematosus with lung involvement    3. Raynaud's disease with gangrene      Plan: Patient with SLE and Raynaud's disease on cellcept, sildenafil, and Plaquenil.  Chest CT shows no pleural thickening or honeycombing.  There is subpleural reticular opacities and traction bronchiectasis the basilar segments of the left lower lobe.  PFT's with no restriction.  6MWT (1600 feet) good with no desaturations.      1. Continue current treatment with cellcept, sildenafil, and Plaquenil. Continue follow ups with rheumatology.    2. RTC in 6 months with 6MWT and PFT's    Luis Higgins NP  Lung Transplant    Attending Note:     I have seen and evaluated the patient with Luis Higgins NP. Their note reflects the content of our discussion and my plan of care.  Very pleasant patient sent for evaluation of ILD.  Has a hx of SLE and is followed with Dr. Johnson.  CT imaging reviewed.  I would argue strongly that this is consistent with an NSIP pattern and do not think it shows any signs of UIP.  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  PFTs show normal spirometry, mild restriction, and mild reduction in DLCO.  No PH or desats on 6MWT.  Currently on MMF and plaquenil.      Agree with current treatment with MMF.  She has mild pulmonary involvement and remains very  functional.  Will continue to monitor spirometry and 6MWT at routine visits.  Would not recommend addition of antifibrotics at this time.      Thank you for the consult.  Please feel free to contact with any questions.      Tamara Katz D.O.  Pulmonary/Critical Care and Lung Transplantation  [facility]

## 2022-05-05 NOTE — LETTER
May 6, 2022        Laurel Johnson  [address]  [address]  [place] [address]  Phone: [phone]  Fax: [phone]             [facility] Trans[facility] 1st Fl  [address]  [facility] [address]  Phone: [phone]   Patient: Christen Bowman   MR Number: [id number]   YOB: 1965   Date of Visit: 5/5/2022       Dear Dr. Laurel Johnson    Thank you for referring Christen Bowman to me for evaluation. Attached you will find relevant portions of my assessment and plan of care.    If you have questions, please do not hesitate to call me. I look forward to following Christen Bowman along with you.    Sincerely,    Tamara Katz, DO    Enclosure    If you would like to receive this communication electronically, please contact [email or web address] or [phone] to request [employer] access.    [employer] is a tool which provides read-only access to select patient information with whom you have a relationship. Its easy to use and provides real time access to review your patients record including encounter summaries, notes, results, and demographic information.    If you feel you have received this communication in error or would no longer like to receive these types of communications, please e-mail [email or web address]

## 2022-05-20 ENCOUNTER — PATIENT MESSAGE (OUTPATIENT)
Dept: PHARMACY | Facility: CLINIC | Age: 57
End: 2022-05-20
Payer: COMMERCIAL

## 2022-05-23 ENCOUNTER — SPECIALTY PHARMACY (OUTPATIENT)
Dept: PHARMACY | Facility: CLINIC | Age: 57
End: 2022-05-23
Payer: COMMERCIAL

## 2022-05-23 NOTE — TELEPHONE ENCOUNTER
Specialty Pharmacy - Refill Coordination    Specialty Medication Orders Linked to Encounter    Flowsheet Row Most Recent Value   Medication #1 sildenafil (REVATIO) 20 mg Tab (Order#[id number], Rx#[id number])          Refill Questions - Documented Responses    Flowsheet Row Most Recent Value   Patient Availability and HIPAA Verification    Does patient want to proceed with activity? Yes   HIPAA/medical authority confirmed? Yes   Relationship to patient of person spoken to? Self   Refill Screening Questions    Changes to allergies? No   Changes to medications? No   New conditions since last clinic visit? No   Unplanned office visit, urgent care, ED, or hospital admission in the last 4 weeks? No   How does patient/caregiver feel medication is working? Good   Financial problems or insurance changes? No   How many doses of your specialty medications were missed in the last 4 weeks? 0   Would patient like to speak to a pharmacist? No   When does the patient need to receive the medication? 05/27/22   Refill Delivery Questions    How will the patient receive the medication? Mail   When does the patient need to receive the medication? 05/27/22   Shipping Address Home   Address in [facility] confirmed and updated if neccessary? Yes   Expected Copay ($) 83.37   Is the patient able to afford the medication copay? Yes   Payment Method CC on file   Days supply of Refill 30   Supplies needed? No supplies needed   Refill activity completed? Yes   Refill activity plan Refill scheduled   Shipment/Pickup Date: 05/27/22          Current Outpatient Medications   Medication Sig    amLODIPine (NORVASC) 10 MG tablet Take 1 tablet (10 mg total) by mouth once daily.    aspirin (ECOTRIN) 81 MG EC tablet Take 81 mg by mouth once daily.    blood sugar diagnostic Strp Checking blood glucose 4 times daily.    blood-glucose meter Misc Use as instructed.    collagenase (SANTYL) ointment Apply topically once daily. (Patient not taking:  "Reported on 2022)    diclofenac sodium (VOLTAREN) 1 % Gel Apply 1 Dose topically.    diclofenac sodium (VOLTAREN) 1 % Gel Apply 2 g topically 4 (four) times daily.    estradioL (ESTRACE) 1 MG tablet TAKE ONE TABLET BY MOUTH EVERY DAY    folic acid (FOLVITE) 1 MG tablet Take 1 tablet by mouth once daily.    gabapentin (NEURONTIN) 300 MG capsule SMARTSI Capsule(s) By Mouth Every Evening    hydrOXYchloroQUINE (PLAQUENIL) 200 mg tablet Take 1 tablet (200 mg total) by mouth 2 (two) times daily.    lancets 33 gauge Misc Test 4 (four) times daily before meals and nightly.    levothyroxine (SYNTHROID) 50 MCG tablet Take 1 tablet (50 mcg total) by mouth every morning.    mycophenolate (CELLCEPT) 500 mg Tab Take 3 tablets (1,500 mg total) by mouth 2 (two) times daily.    NIFEdipine (ADALAT CC) 30 MG TbSR Take 2 tablets (60 mg total) by mouth once daily.    oxyCODONE-acetaminophen (PERCOCET)  mg per tablet Take 1 tablet by mouth every 24 hours as needed for Pain.    pantoprazole (PROTONIX) 40 MG tablet Take 1 tablet (40 mg total) by mouth once daily.    pen needle, diabetic 32 gauge x 5/32" Ndle Inject 4 times daily.    predniSONE (DELTASONE) 20 MG tablet Take 60 mg by mouth once daily.    predniSONE (DELTASONE) 5 MG tablet Take 1 tablet (5 mg total) by mouth once daily.    pregabalin (LYRICA ORAL) Take 150 mg by mouth 2 (two) times a day.    progesterone (PROMETRIUM) 200 MG capsule TAKE ONE CAPSULE BY MOUTH EVERY DAY (Patient taking differently: Take 200 mg by mouth every evening.)    sildenafil (REVATIO) 20 mg Tab Take 1 tablet (20 mg total) by mouth 3 (three) times daily.    sulfamethoxazole-trimethoprim 800-160mg (BACTRIM DS) 800-160 mg Tab Take 1 tablet by mouth every Mon, Wed, Fri.   Last reviewed on 2022 11:39 AM by Luis Higgins NP    Review of patient's allergies indicates:  No Known Allergies Last reviewed on  2022 11:39 AM by Luis Higgins      Tasks added this encounter "   6/19/2022 - Refill Call (Auto Added)   Tasks due within next 3 months   No tasks due.     [facility]  [address]  [facility] [address]  Phone: [phone]  Fax: [phone]

## 2022-06-20 ENCOUNTER — SPECIALTY PHARMACY (OUTPATIENT)
Dept: PHARMACY | Facility: CLINIC | Age: 57
End: 2022-06-20
Payer: MEDICAID

## 2022-06-20 NOTE — TELEPHONE ENCOUNTER
Specialty Pharmacy - Refill Coordination    Specialty Medication Orders Linked to Encounter    Flowsheet Row Most Recent Value   Medication #1 sildenafil (REVATIO) 20 mg Tab (Order#[id number], Rx#[id number])          Refill Questions - Documented Responses    Flowsheet Row Most Recent Value   Patient Availability and HIPAA Verification    Does patient want to proceed with activity? Yes   HIPAA/medical authority confirmed? Yes   Relationship to patient of person spoken to? Self   Refill Screening Questions    Changes to allergies? No   Changes to medications? No   New conditions since last clinic visit? No   Unplanned office visit, urgent care, ED, or hospital admission in the last 4 weeks? No   How does patient/caregiver feel medication is working? Good   Financial problems or insurance changes? No   How many doses of your specialty medications were missed in the last 4 weeks? 1   Would patient like to speak to a pharmacist? No   When does the patient need to receive the medication? 06/27/22   Refill Delivery Questions    How will the patient receive the medication? Mail   When does the patient need to receive the medication? 06/27/22   Shipping Address Home   Address in [facility] confirmed and updated if neccessary? Yes   Expected Copay ($) 83.37   Is the patient able to afford the medication copay? Yes   Payment Method CC on file   Days supply of Refill 30   Supplies needed? No supplies needed   Refill activity completed? Yes   Refill activity plan Refill scheduled   Shipment/Pickup Date: 06/27/22          Current Outpatient Medications   Medication Sig    amLODIPine (NORVASC) 10 MG tablet Take 1 tablet (10 mg total) by mouth once daily.    aspirin (ECOTRIN) 81 MG EC tablet Take 81 mg by mouth once daily.    blood sugar diagnostic Strp Checking blood glucose 4 times daily.    blood-glucose meter Misc Use as instructed.    collagenase (SANTYL) ointment Apply topically once daily. (Patient not taking:  "Reported on 2022)    diclofenac sodium (VOLTAREN) 1 % Gel Apply 1 Dose topically.    diclofenac sodium (VOLTAREN) 1 % Gel Apply 2 g topically 4 (four) times daily.    estradioL (ESTRACE) 1 MG tablet TAKE ONE TABLET BY MOUTH EVERY DAY    folic acid (FOLVITE) 1 MG tablet Take 1 tablet by mouth once daily.    gabapentin (NEURONTIN) 300 MG capsule SMARTSI Capsule(s) By Mouth Every Evening    hydrOXYchloroQUINE (PLAQUENIL) 200 mg tablet Take 1 tablet (200 mg total) by mouth 2 (two) times daily.    lancets 33 gauge Misc Test 4 (four) times daily before meals and nightly.    levothyroxine (SYNTHROID) 50 MCG tablet Take 1 tablet (50 mcg total) by mouth every morning.    mycophenolate (CELLCEPT) 500 mg Tab Take 3 tablets (1,500 mg total) by mouth 2 (two) times daily.    NIFEdipine (ADALAT CC) 30 MG TbSR Take 2 tablets (60 mg total) by mouth once daily.    oxyCODONE-acetaminophen (PERCOCET)  mg per tablet Take 1 tablet by mouth every 24 hours as needed for Pain.    pantoprazole (PROTONIX) 40 MG tablet Take 1 tablet (40 mg total) by mouth once daily.    pen needle, diabetic 32 gauge x 5/32" Ndle Inject 4 times daily.    predniSONE (DELTASONE) 20 MG tablet Take 60 mg by mouth once daily.    predniSONE (DELTASONE) 5 MG tablet Take 1 tablet (5 mg total) by mouth once daily.    pregabalin (LYRICA ORAL) Take 150 mg by mouth 2 (two) times a day.    progesterone (PROMETRIUM) 200 MG capsule TAKE ONE CAPSULE BY MOUTH EVERY DAY (Patient taking differently: Take 200 mg by mouth every evening.)    sildenafil (REVATIO) 20 mg Tab Take 1 tablet (20 mg total) by mouth 3 (three) times daily.    sulfamethoxazole-trimethoprim 800-160mg (BACTRIM DS) 800-160 mg Tab Take 1 tablet by mouth every Mon, Wed, Fri.   Last reviewed on 2022 11:39 AM by Luis Higgins NP    Review of patient's allergies indicates:  No Known Allergies Last reviewed on  2022 11:39 AM by Luis Higgins      Tasks added this encounter "   7/20/2022 - Refill Call (Auto Added)   Tasks due within next 3 months   No tasks due.     Teresa Francois, Patient Care Assistant  [facility]  [address]  [facility] [address]  Phone: [phone]  Fax: [phone]

## 2022-07-20 ENCOUNTER — SPECIALTY PHARMACY (OUTPATIENT)
Dept: PHARMACY | Facility: CLINIC | Age: 57
End: 2022-07-20
Payer: MEDICAID

## 2022-07-20 NOTE — TELEPHONE ENCOUNTER
Specialty Pharmacy - Refill Coordination    Specialty Medication Orders Linked to Encounter    Flowsheet Row Most Recent Value   Medication #1 sildenafil (REVATIO) 20 mg Tab (Order#[id number], Rx#[id number])          Refill Questions - Documented Responses    Flowsheet Row Most Recent Value   Patient Availability and HIPAA Verification    Does patient want to proceed with activity? Yes   HIPAA/medical authority confirmed? Yes   Relationship to patient of person spoken to? Self   Refill Screening Questions    Changes to allergies? No   Changes to medications? No   New conditions since last clinic visit? No   Unplanned office visit, urgent care, ED, or hospital admission in the last 4 weeks? No   How does patient/caregiver feel medication is working? Good   Financial problems or insurance changes? No   How many doses of your specialty medications were missed in the last 4 weeks? 0   Would patient like to speak to a pharmacist? No   When does the patient need to receive the medication? 07/27/22   Refill Delivery Questions    How will the patient receive the medication? Mail   When does the patient need to receive the medication? 07/27/22   Shipping Address Home   Address in [facility] confirmed and updated if neccessary? Yes   Expected Copay ($) 83.37   Is the patient able to afford the medication copay? Yes   Payment Method CC on file   Days supply of Refill 30   Supplies needed? No supplies needed   Refill activity completed? Yes   Refill activity plan Refill scheduled   Shipment/Pickup Date: 07/25/22          Current Outpatient Medications   Medication Sig    amLODIPine (NORVASC) 10 MG tablet Take 1 tablet (10 mg total) by mouth once daily.    aspirin (ECOTRIN) 81 MG EC tablet Take 81 mg by mouth once daily.    blood sugar diagnostic Strp Checking blood glucose 4 times daily.    blood-glucose meter Misc Use as instructed.    collagenase (SANTYL) ointment Apply topically once daily. (Patient not taking:  "Reported on 2022)    diclofenac sodium (VOLTAREN) 1 % Gel Apply 1 Dose topically.    diclofenac sodium (VOLTAREN) 1 % Gel Apply 2 g topically 4 (four) times daily.    estradioL (ESTRACE) 1 MG tablet TAKE ONE TABLET BY MOUTH EVERY DAY    folic acid (FOLVITE) 1 MG tablet Take 1 tablet by mouth once daily.    gabapentin (NEURONTIN) 300 MG capsule SMARTSI Capsule(s) By Mouth Every Evening    hydrOXYchloroQUINE (PLAQUENIL) 200 mg tablet Take 1 tablet (200 mg total) by mouth 2 (two) times daily.    lancets 33 gauge Misc Test 4 (four) times daily before meals and nightly.    levothyroxine (SYNTHROID) 50 MCG tablet Take 1 tablet (50 mcg total) by mouth every morning.    mycophenolate (CELLCEPT) 500 mg Tab Take 3 tablets (1,500 mg total) by mouth 2 (two) times daily.    NIFEdipine (ADALAT CC) 30 MG TbSR Take 2 tablets (60 mg total) by mouth once daily.    oxyCODONE-acetaminophen (PERCOCET)  mg per tablet Take 1 tablet by mouth every 24 hours as needed for Pain.    pantoprazole (PROTONIX) 40 MG tablet Take 1 tablet (40 mg total) by mouth once daily.    pen needle, diabetic 32 gauge x 5/32" Ndle Inject 4 times daily.    predniSONE (DELTASONE) 20 MG tablet Take 60 mg by mouth once daily.    predniSONE (DELTASONE) 5 MG tablet Take 1 tablet (5 mg total) by mouth once daily.    pregabalin (LYRICA ORAL) Take 150 mg by mouth 2 (two) times a day.    progesterone (PROMETRIUM) 200 MG capsule TAKE ONE CAPSULE BY MOUTH EVERY DAY (Patient taking differently: Take 200 mg by mouth every evening.)    sildenafil (REVATIO) 20 mg Tab Take 1 tablet (20 mg total) by mouth 3 (three) times daily.    sulfamethoxazole-trimethoprim 800-160mg (BACTRIM DS) 800-160 mg Tab Take 1 tablet by mouth every Mon, Wed, Fri.   Last reviewed on 2022 11:39 AM by Luis iHggins NP    Review of patient's allergies indicates:  No Known Allergies Last reviewed on  2022 11:39 AM by Luis Higgins      Tasks added this encounter "   8/19/2022 - Refill Call (Auto Added)   Tasks due within next 3 months   No tasks due.     [facility]  [address]  [facility] [address]  Phone: [phone]  Fax: [phone]

## 2022-08-16 DIAGNOSIS — I73.01 RAYNAUD'S DISEASE WITH GANGRENE: Primary | ICD-10-CM

## 2022-08-16 DIAGNOSIS — Z79.899 LONG-TERM USE OF PLAQUENIL: ICD-10-CM

## 2022-08-19 ENCOUNTER — SPECIALTY PHARMACY (OUTPATIENT)
Dept: PHARMACY | Facility: CLINIC | Age: 57
End: 2022-08-19
Payer: MEDICAID

## 2022-08-19 NOTE — TELEPHONE ENCOUNTER
Specialty Pharmacy - Refill Coordination    Specialty Medication Orders Linked to Encounter    Flowsheet Row Most Recent Value   Medication #1 sildenafil (REVATIO) 20 mg Tab (Order#[id number], Rx#[id number])      Patient requested to have delivery on 8/29 due to finances.    Refill Questions - Documented Responses    Flowsheet Row Most Recent Value   Patient Availability and HIPAA Verification    Does patient want to proceed with activity? Yes   HIPAA/medical authority confirmed? Yes   Relationship to patient of person spoken to? Self   Refill Screening Questions    Changes to allergies? No   Changes to medications? No   New conditions since last clinic visit? No   Unplanned office visit, urgent care, ED, or hospital admission in the last 4 weeks? No   How does patient/caregiver feel medication is working? Very good   Financial problems or insurance changes? No   How many doses of your specialty medications were missed in the last 4 weeks? 2   Why were doses missed? Simply forgot   Would patient like to speak to a pharmacist? No   When does the patient need to receive the medication? 09/01/22   Refill Delivery Questions    When does the patient need to receive the medication? 09/01/22          Current Outpatient Medications   Medication Sig    amLODIPine (NORVASC) 10 MG tablet Take 1 tablet (10 mg total) by mouth once daily.    aspirin (ECOTRIN) 81 MG EC tablet Take 81 mg by mouth once daily.    blood sugar diagnostic Strp Checking blood glucose 4 times daily.    blood-glucose meter Misc Use as instructed.    collagenase (SANTYL) ointment Apply topically once daily. (Patient not taking: Reported on 1/20/2022)    diclofenac sodium (VOLTAREN) 1 % Gel Apply 1 Dose topically.    diclofenac sodium (VOLTAREN) 1 % Gel Apply 2 g topically 4 (four) times daily.    estradioL (ESTRACE) 1 MG tablet TAKE ONE TABLET BY MOUTH EVERY DAY    folic acid (FOLVITE) 1 MG tablet Take 1 tablet by mouth once daily.    gabapentin  "(NEURONTIN) 300 MG capsule SMARTSI Capsule(s) By Mouth Every Evening    hydrOXYchloroQUINE (PLAQUENIL) 200 mg tablet Take 1 tablet (200 mg total) by mouth 2 (two) times daily.    lancets 33 gauge Misc Test 4 (four) times daily before meals and nightly.    levothyroxine (SYNTHROID) 50 MCG tablet Take 1 tablet (50 mcg total) by mouth every morning.    mycophenolate (CELLCEPT) 500 mg Tab Take 3 tablets (1,500 mg total) by mouth 2 (two) times daily.    NIFEdipine (ADALAT CC) 30 MG TbSR Take 2 tablets (60 mg total) by mouth once daily.    oxyCODONE-acetaminophen (PERCOCET)  mg per tablet Take 1 tablet by mouth every 24 hours as needed for Pain.    pantoprazole (PROTONIX) 40 MG tablet Take 1 tablet (40 mg total) by mouth once daily.    pen needle, diabetic 32 gauge x 5/32" Ndle Inject 4 times daily.    predniSONE (DELTASONE) 20 MG tablet Take 60 mg by mouth once daily.    predniSONE (DELTASONE) 5 MG tablet Take 1 tablet (5 mg total) by mouth once daily.    pregabalin (LYRICA ORAL) Take 150 mg by mouth 2 (two) times a day.    progesterone (PROMETRIUM) 200 MG capsule TAKE ONE CAPSULE BY MOUTH EVERY DAY (Patient taking differently: Take 200 mg by mouth every evening.)    sildenafil (REVATIO) 20 mg Tab Take 1 tablet (20 mg total) by mouth 3 (three) times daily.    sulfamethoxazole-trimethoprim 800-160mg (BACTRIM DS) 800-160 mg Tab Take 1 tablet by mouth every Mon, Wed, Fri.   Last reviewed on 2022 11:39 AM by Luis Higgins NP    Review of patient's allergies indicates:  No Known Allergies Last reviewed on  2022 11:39 AM by Luis Higgins      Tasks added this encounter   2022 - Refill Call (Auto Added)   Tasks due within next 3 months   No tasks due.     Carolyne Gaming, PharmD  [facility]  [address] [place] [address]  Phone: [phone]  Fax: [phone]      "

## 2022-08-25 ENCOUNTER — LAB VISIT (OUTPATIENT)
Dept: LAB | Facility: HOSPITAL | Age: 57
End: 2022-08-25
Attending: INTERNAL MEDICINE
Payer: COMMERCIAL

## 2022-08-25 ENCOUNTER — PATIENT MESSAGE (OUTPATIENT)
Dept: RHEUMATOLOGY | Facility: CLINIC | Age: 57
End: 2022-08-25

## 2022-08-25 ENCOUNTER — OFFICE VISIT (OUTPATIENT)
Dept: RHEUMATOLOGY | Facility: CLINIC | Age: 57
End: 2022-08-25
Payer: COMMERCIAL

## 2022-08-25 VITALS
HEIGHT: 66 IN | HEART RATE: 87 BPM | SYSTOLIC BLOOD PRESSURE: 143 MMHG | WEIGHT: 157.88 LBS | DIASTOLIC BLOOD PRESSURE: 94 MMHG | BODY MASS INDEX: 25.37 KG/M2

## 2022-08-25 DIAGNOSIS — I73.01 RAYNAUD'S DISEASE WITH GANGRENE: ICD-10-CM

## 2022-08-25 DIAGNOSIS — Z79.899 ENCOUNTER FOR LONG TERM USE OF MYCOPHENOLATE MOFETIL: ICD-10-CM

## 2022-08-25 DIAGNOSIS — I73.01 RAYNAUD'S DISEASE WITH GANGRENE: Primary | ICD-10-CM

## 2022-08-25 DIAGNOSIS — J84.9 ILD (INTERSTITIAL LUNG DISEASE): ICD-10-CM

## 2022-08-25 LAB
BILIRUB UR QL STRIP: NEGATIVE
CLARITY UR REFRACT.AUTO: CLEAR
COLOR UR AUTO: COLORLESS
CREAT UR-MCNC: 71 MG/DL (ref 15–325)
GLUCOSE UR QL STRIP: NEGATIVE
HGB UR QL STRIP: NEGATIVE
KETONES UR QL STRIP: NEGATIVE
LEUKOCYTE ESTERASE UR QL STRIP: ABNORMAL
MICROSCOPIC COMMENT: NORMAL
NITRITE UR QL STRIP: NEGATIVE
PH UR STRIP: 7 [PH] (ref 5–8)
PROT UR QL STRIP: NEGATIVE
PROT UR-MCNC: <7 MG/DL (ref 0–15)
PROT/CREAT UR: NORMAL MG/G{CREAT} (ref 0–0.2)
RBC #/AREA URNS AUTO: 1 /HPF (ref 0–4)
SP GR UR STRIP: 1.02 (ref 1–1.03)
SQUAMOUS #/AREA URNS AUTO: 1 /HPF
URN SPEC COLLECT METH UR: ABNORMAL
WBC #/AREA URNS AUTO: 4 /HPF (ref 0–5)

## 2022-08-25 PROCEDURE — 99214 OFFICE O/P EST MOD 30 MIN: CPT | Mod: NTX,S$GLB,, | Performed by: INTERNAL MEDICINE

## 2022-08-25 PROCEDURE — 99214 PR OFFICE/OUTPT VISIT, EST, LEVL IV, 30-39 MIN: ICD-10-PCS | Mod: NTX,S$GLB,, | Performed by: INTERNAL MEDICINE

## 2022-08-25 PROCEDURE — 99214 OFFICE O/P EST MOD 30 MIN: CPT | Mod: PBBFAC,NTX | Performed by: INTERNAL MEDICINE

## 2022-08-25 PROCEDURE — 99999 PR PBB SHADOW E&M-EST. PATIENT-LVL IV: CPT | Mod: PBBFAC,TXP,, | Performed by: INTERNAL MEDICINE

## 2022-08-25 PROCEDURE — 82570 ASSAY OF URINE CREATININE: CPT | Mod: TXP | Performed by: INTERNAL MEDICINE

## 2022-08-25 PROCEDURE — 81001 URINALYSIS AUTO W/SCOPE: CPT | Mod: TXP | Performed by: INTERNAL MEDICINE

## 2022-08-25 PROCEDURE — 99999 PR PBB SHADOW E&M-EST. PATIENT-LVL IV: ICD-10-PCS | Mod: PBBFAC,TXP,, | Performed by: INTERNAL MEDICINE

## 2022-08-25 NOTE — PROGRESS NOTES
Subjective:       Patient ID: Christen Bowman is a 55 y.o. female.    Chief Complaint: No chief complaint on file.    HPI 55 year old F with PMH of HTN, hypothyroidism, SLE, RA here to establish care. She was diagnosed with SLE in 1996  when she presented with +KENNY, arthritis , rash, raynauds,pleurisy (pericarditis),  and thrombocytopenia.  She reports she came in to hospital due to chest pain and pain/swelling in hands and elbows.  She was told to have pericardial effusion.   She was hospitalized for thrombocytopenia around age 22 and was treated with high dose steroids and Iv IG/plasmapheresis. She required splenectomy in   Due to thrombocytopenia.  She had one first trimester pregnancy loss. She was initially put on plaquenil when she was diagnosed but it was stopped at some point.        About a year ago (1/2020), she developed aphasia/confusion and had CVA. She was treated with high doses of steroids and Iv IG but no anticoagulation.  Since the stroke, she is more forgetful.        She as recently hospitalized in outside hospital for arthritis, raynauds causing ulcerations with digital ischemia and bacteremia.   It appears on December 12,2020, she developed URI symptoms (covid test-negative). She was having sinus drainage. She went to urgent care.  She went home and the next day,she noticed raynauds in hands and feet.  Within a matter of hours, she developed the blisters in lower extremities.   She was noted to have platelets of 5000 and creat 1.6 (baseline 0.64). White count had increased to 30 thousand. During admission, she developed severe ulcerations in her lower legs and toes and gangrene developed in toes. She was treated with high doses of steroids. She was also treated with antibiotics for gram positive cocci in blood cultures. Apparently, she also had bone marrow biopsy which did not show any significant changes and  BALDO did not show endocarditis.  During hospitalization, patient developed worsening  ulcerations and gangrene of multiple toes. She had lower extremity US which did not show DVT and was not put on anticoagulation. She was discharged on prednisone 80mg daily, mtx 10mg po week, diltiazem 360mg ER, nystatin, tylenol-3, estradiol and HCTZ 25mg poqday.        She is on clindamycin 300mg po q8 hours , prednisone 10mg a day, MTX 4 pills once a week, folic acid, oxycodone 10/325mg po qhs. She saw Dr. Yash Santana about 2 weeks ago. At that time, she was put on clindamycin. She saw orthopedic who is considering amputation.  Denies fevers, pleurisy, sob, alopecia. Reports raynauds in hands and feet.    Interval history: She has not been in the hospital. She is off prednisone.  She is taking cellcept 1500mg po BID.She is on sildenafil TID. She is taking nifedipene  60mg po qhs..  She is taking plaquenil 200mg po BID. Denies any rashes, oral ulcers, hair loss, joint pain or swelling.    Past Medical History:   Diagnosis Date    Raynaud's disease     Rheumatoid arteritis     Systemic lupus erythematosus    ,  Review of Systems   Constitutional: Negative for activity change, appetite change, chills, diaphoresis, fatigue and fever.   HENT: Negative for congestion, ear discharge, ear pain, facial swelling, mouth sores, sinus pressure, sneezing, sore throat, tinnitus and trouble swallowing.    Eyes: Negative for photophobia, pain, discharge, redness, itching and visual disturbance.   Respiratory: Negative for apnea, chest tightness, shortness of breath, wheezing and stridor.    Cardiovascular: Negative for leg swelling.   Gastrointestinal: Negative for abdominal distention, abdominal pain, anal bleeding, blood in stool, constipation, diarrhea and nausea.   Endocrine: Negative for cold intolerance and heat intolerance.   Genitourinary: Negative for difficulty urinating and dysuria.   Musculoskeletal: Negative for arthralgias, back pain, gait problem, joint swelling, myalgias, neck pain and neck stiffness.   Skin:  Negative for color change, pallor, rash and wound.   Neurological: Negative for dizziness, seizures, light-headedness and numbness.   Hematological: Negative for adenopathy. Does not bruise/bleed easily.   Psychiatric/Behavioral: Negative for sleep disturbance. The patient is not nervous/anxious.              Objective:   There were no vitals taken for this visit.   BP: 142/98  Physical Exam   Constitutional: She is oriented to person, place, and time.   HENT:   Head: Normocephalic and atraumatic.   Right Ear: External ear normal.   Left Ear: External ear normal.   Nose: Nose normal.   Mouth/Throat: Oropharynx is clear and moist. No oropharyngeal exudate.   Eyes: Conjunctivae and EOM are normal. Pupils are equal, round, and reactive to light. Right eye exhibits no discharge. Left eye exhibits no discharge. No scleral icterus.   Neck: No JVD present. No thyromegaly present.   Cardiovascular: Normal rate, regular rhythm, normal heart sounds and intact distal pulses.  Exam reveals no gallop and no friction rub.    No murmur heard.  Pulmonary/Chest: Effort normal and breath sounds normal. No respiratory distress. She has no wheezes. She has no rales. She exhibits no tenderness.   Abdominal: Soft. Bowel sounds are normal. She exhibits no distension and no mass. There is no abdominal tenderness. There is no rebound and no guarding.   Lymphadenopathy:     She has no cervical adenopathy.   Neurological: She is alert and oriented to person, place, and time. No cranial nerve deficit. Gait normal. Coordination normal.   Skin: Skin is dry. No rash noted. No erythema. No pallor.     Psychiatric: Affect and judgment normal.   Musculoskeletal: Tenderness and edema present. No deformity.      Comments: Gangrenous digits in left foot;   Dark Discoloration in second through fourth toes on right    Lower shins wrapped      labs: 3/3/2021  Wbc-12.6  Hematocrit-37  Plate-389  Labs 2/3/21  cmp-wnl  No data to display     Assessment:      57 year old F with PMH of HTN, hypothyroidism, SLE, RA here for follow up of SLE. She was diagnosed with SLE in 1996  when she presented with +KENNY,+dsdna,  arthritis , rash, raynauds,pleurisy (pericarditis),  and thrombocytopenia. She was recently hospitalized for severe raynauds leading to gangrene in right foot and is s/p partial amputation 3/30/2021.  During that hospitalization, she was noted to have  abnormal CT chest concerning for ILD.    Given her history of CVA, first trimester pregnancy loss, and progressive gangrene, patient was admitted for anticoagulation for suspected APS but APS work up was negative.  She was admitted from 2/5 to 2/11/21 for  epoprostenol for 5 days and pulse dose steroids.    SLE:  +KENNY,+dsdna,  arthritis , rash, raynauds,pleurisy (pericarditis), thrombocytopenia, severe raynauds requiring amputation.  #raynauds: doing much better.  Continue nifedipine 60mg poq day (takes 30mg tablets)  Continue sildenafil 20mg po TID  Continue cellcept 1500mg po BID  Continue plaquenil twice a day  Continue aspirin 81 mg po qday  Consider benylsta injections if needed.  LABS today      #ILD: noted on CT chest. Denies symptoms  -cellcept as above  -following in ILD clinic    30 * minutes of total time spent on the encounter, which includes face to face time and non-face to face time preparing to see the patient (eg, review of tests), Obtaining and/or reviewing separately obtained history, Documenting clinical information in the electronic or other health record, Independently interpreting results (not separately reported) and communicating results to the patient/family/caregiver, or Care coordination (not separately reported).

## 2022-08-25 NOTE — PROGRESS NOTES
Answers for HPI/ROS submitted by the patient on 8/18/2022  fever: No  eye redness: No  mouth sores: No  headaches: No  shortness of breath: No  chest pain: No  trouble swallowing: No  diarrhea: No  constipation: No  unexpected weight change: No  genital sore: No  dysuria: No  During the last 3 days, have you had a skin rash?: No  Bruises or bleeds easily: No  cough: No

## 2022-08-26 RX ORDER — MYCOPHENOLATE MOFETIL 500 MG/1
1500 TABLET ORAL 2 TIMES DAILY
Qty: 180 TABLET | Refills: 4 | Status: SHIPPED | OUTPATIENT
Start: 2022-08-26 | End: 2023-02-07

## 2022-08-26 RX ORDER — NIFEDIPINE 30 MG/1
60 TABLET, FILM COATED, EXTENDED RELEASE ORAL DAILY
Qty: 60 TABLET | Refills: 11 | Status: SHIPPED | OUTPATIENT
Start: 2022-08-26 | End: 2023-10-05

## 2022-08-26 RX ORDER — HYDROXYCHLOROQUINE SULFATE 200 MG/1
200 TABLET, FILM COATED ORAL 2 TIMES DAILY
Qty: 180 TABLET | Refills: 3 | Status: SHIPPED | OUTPATIENT
Start: 2022-08-26 | End: 2022-12-09 | Stop reason: SDUPTHER

## 2022-09-19 ENCOUNTER — SPECIALTY PHARMACY (OUTPATIENT)
Dept: PHARMACY | Facility: CLINIC | Age: 57
End: 2022-09-19
Payer: MEDICAID

## 2022-09-19 NOTE — TELEPHONE ENCOUNTER
Specialty Pharmacy - Refill Coordination    Specialty Medication Orders Linked to Encounter      Flowsheet Row Most Recent Value   Medication #1 sildenafil (REVATIO) 20 mg Tab (Order#[id number], Rx#[id number])     New medications Flonase and Claritin- no significant DDI.  Appropriate to proceed with revatio refill.       Refill Questions - Documented Responses      Flowsheet Row Most Recent Value   Patient Availability and HIPAA Verification    Does patient want to proceed with activity? Yes   HIPAA/medical authority confirmed? Yes   Relationship to patient of person spoken to? Self   Refill Screening Questions    Changes to allergies? No   Changes to medications? Yes  [+ Claritin and Flonase]   New conditions since last clinic visit? Yes   Unplanned office visit, urgent care, ED, or hospital admission in the last 4 weeks? Yes  Sinus infection   How does patient/caregiver feel medication is working? Very good   Financial problems or insurance changes? No   How many doses of your specialty medications were missed in the last 4 weeks? 0   Would patient like to speak to a pharmacist? No   When does the patient need to receive the medication? 09/26/22   Refill Delivery Questions    How will the patient receive the medication? Mail   When does the patient need to receive the medication? 09/26/22   Shipping Address Home   Address in [facility] confirmed and updated if neccessary? Yes   Expected Copay ($) 16.67   Is the patient able to afford the medication copay? Yes   Payment Method CC on file   Days supply of Refill 30   Supplies needed? No supplies needed   Refill activity completed? Yes   Refill activity plan Refill scheduled   Shipment/Pickup Date: 09/21/22            Current Outpatient Medications   Medication Sig    amLODIPine (NORVASC) 10 MG tablet Take 1 tablet (10 mg total) by mouth once daily.    aspirin (ECOTRIN) 81 MG EC tablet Take 81 mg by mouth once daily.    blood sugar diagnostic Strp Checking  "blood glucose 4 times daily.    blood-glucose meter Misc Use as instructed.    collagenase (SANTYL) ointment Apply topically once daily. (Patient not taking: Reported on 2022)    diclofenac sodium (VOLTAREN) 1 % Gel Apply 1 Dose topically.    diclofenac sodium (VOLTAREN) 1 % Gel Apply 2 g topically 4 (four) times daily.    estradioL (ESTRACE) 1 MG tablet TAKE ONE TABLET BY MOUTH EVERY DAY    folic acid (FOLVITE) 1 MG tablet Take 1 tablet by mouth once daily.    gabapentin (NEURONTIN) 300 MG capsule SMARTSI Capsule(s) By Mouth Every Evening    hydrOXYchloroQUINE (PLAQUENIL) 200 mg tablet Take 1 tablet (200 mg total) by mouth 2 (two) times daily.    lancets 33 gauge Misc Test 4 (four) times daily before meals and nightly.    levothyroxine (SYNTHROID) 50 MCG tablet Take 1 tablet (50 mcg total) by mouth every morning.    mycophenolate (CELLCEPT) 500 mg Tab Take 3 tablets (1,500 mg total) by mouth 2 (two) times daily.    NIFEdipine (ADALAT CC) 30 MG TbSR Take 2 tablets (60 mg total) by mouth once daily.    oxyCODONE-acetaminophen (PERCOCET)  mg per tablet Take 1 tablet by mouth every 24 hours as needed for Pain.    pantoprazole (PROTONIX) 40 MG tablet Take 1 tablet (40 mg total) by mouth once daily.    pen needle, diabetic 32 gauge x 5/32" Ndle Inject 4 times daily.    predniSONE (DELTASONE) 20 MG tablet Take 60 mg by mouth once daily.    predniSONE (DELTASONE) 5 MG tablet Take 1 tablet (5 mg total) by mouth once daily.    pregabalin (LYRICA ORAL) Take 150 mg by mouth 2 (two) times a day.    progesterone (PROMETRIUM) 200 MG capsule TAKE ONE CAPSULE BY MOUTH EVERY DAY (Patient taking differently: Take 200 mg by mouth every evening.)    sildenafil (REVATIO) 20 mg Tab Take 1 tablet (20 mg total) by mouth 3 (three) times daily.    sulfamethoxazole-trimethoprim 800-160mg (BACTRIM DS) 800-160 mg Tab Take 1 tablet by mouth every Mon, Wed, Fri.   Last reviewed on 2022  9:21 AM by Neris Bland MA    Review of " patient's allergies indicates:  No Known Allergies Last reviewed on  8/26/2022 3:55 PM by [place] Johnson      Tasks added this encounter   10/19/2022 - Refill Call (Auto Added)   Tasks due within next 3 months   No tasks due.     Jackie Peralta, PharmD  [facility]  [address] [place] [id number]  Phone: [phone]  Fax: [phone]

## 2022-09-19 NOTE — TELEPHONE ENCOUNTER
Outgoing call to pt regarding revatio refill. Pt stated she recently went to urgent care for a sinus infection. But could not recall names at this time. Pt requested a call back after 4 pm as she will have the new medications with her. Routed to [facility].

## 2022-10-10 ENCOUNTER — TELEPHONE (OUTPATIENT)
Dept: TRANSPLANT | Facility: CLINIC | Age: 57
End: 2022-10-10
Payer: MEDICAID

## 2022-10-12 DIAGNOSIS — M32.13 DRUG-INDUCED SYSTEMIC LUPUS ERYTHEMATOSUS WITH LUNG INVOLVEMENT: Primary | ICD-10-CM

## 2022-10-12 DIAGNOSIS — J84.9 ILD (INTERSTITIAL LUNG DISEASE): ICD-10-CM

## 2022-10-12 DIAGNOSIS — M32.0 DRUG-INDUCED SYSTEMIC LUPUS ERYTHEMATOSUS WITH LUNG INVOLVEMENT: Primary | ICD-10-CM

## 2022-10-19 ENCOUNTER — SPECIALTY PHARMACY (OUTPATIENT)
Dept: PHARMACY | Facility: CLINIC | Age: 57
End: 2022-10-19
Payer: MEDICAID

## 2022-10-19 NOTE — TELEPHONE ENCOUNTER
Incoming call from patient stating that she has two weeks of Revatio on hand. She agreed to have [facility] call back in one week for refill.

## 2022-10-25 NOTE — TELEPHONE ENCOUNTER
Patient reported that she is currently being treated for a sinus infection, but she does not remember the name of the antibiotic she is taking. I informed her that [facility] can follow up with her later today to make sure there are no DDIs. Patient requested a call back around 4pm. Opening I-vent for timely follow up.     Patient stated that she will need her refill next week.

## 2022-10-28 NOTE — TELEPHONE ENCOUNTER
Specialty Pharmacy - Refill Coordination    Specialty Medication Orders Linked to Encounter      Flowsheet Row Most Recent Value   Medication #1 sildenafil (REVATIO) 20 mg Tab (Order#[id number], Rx#[id number])     Pt already completed Azithromycin for sinus infection.  Appropriate to proceed with Revatio refill. No clinical issues.        Refill Questions - Documented Responses      Flowsheet Row Most Recent Value   Patient Availability and HIPAA Verification    Does patient want to proceed with activity? Yes   HIPAA/medical authority confirmed? Yes   Relationship to patient of person spoken to? Self   Refill Screening Questions    Changes to allergies? No   Changes to medications? Yes  [completed course of Azithromycin]   New conditions since last clinic visit? Yes  [sinus infection]   Unplanned office visit, urgent care, ED, or hospital admission in the last 4 weeks? Yes   How does patient/caregiver feel medication is working? Good   Financial problems or insurance changes? No   How many doses of your specialty medications were missed in the last 4 weeks? 0   Would patient like to speak to a pharmacist? Yes   When does the patient need to receive the medication? 11/01/22   Refill Delivery Questions    How will the patient receive the medication? Mail   When does the patient need to receive the medication? 11/01/22   Shipping Address Home   Address in [facility] confirmed and updated if neccessary? Yes   Expected Copay ($) 16.67   Is the patient able to afford the medication copay? Yes   Payment Method CC on file   Days supply of Refill 30   Supplies needed? No supplies needed   Refill activity completed? Yes   Refill activity plan Refill scheduled   Shipment/Pickup Date: 10/31/22            Current Outpatient Medications   Medication Sig    amLODIPine (NORVASC) 10 MG tablet Take 1 tablet (10 mg total) by mouth once daily.    aspirin (ECOTRIN) 81 MG EC tablet Take 81 mg by mouth once daily.    blood sugar  "diagnostic Strp Checking blood glucose 4 times daily.    blood-glucose meter Misc Use as instructed.    collagenase (SANTYL) ointment Apply topically once daily. (Patient not taking: Reported on 2022)    diclofenac sodium (VOLTAREN) 1 % Gel Apply 1 Dose topically.    diclofenac sodium (VOLTAREN) 1 % Gel Apply 2 g topically 4 (four) times daily.    estradioL (ESTRACE) 1 MG tablet TAKE ONE TABLET BY MOUTH EVERY DAY    folic acid (FOLVITE) 1 MG tablet Take 1 tablet by mouth once daily.    gabapentin (NEURONTIN) 300 MG capsule SMARTSI Capsule(s) By Mouth Every Evening    hydrOXYchloroQUINE (PLAQUENIL) 200 mg tablet Take 1 tablet (200 mg total) by mouth 2 (two) times daily.    lancets 33 gauge Misc Test 4 (four) times daily before meals and nightly.    levothyroxine (SYNTHROID) 50 MCG tablet Take 1 tablet (50 mcg total) by mouth every morning.    mycophenolate (CELLCEPT) 500 mg Tab Take 3 tablets (1,500 mg total) by mouth 2 (two) times daily.    NIFEdipine (ADALAT CC) 30 MG TbSR Take 2 tablets (60 mg total) by mouth once daily.    oxyCODONE-acetaminophen (PERCOCET)  mg per tablet Take 1 tablet by mouth every 24 hours as needed for Pain.    pantoprazole (PROTONIX) 40 MG tablet Take 1 tablet (40 mg total) by mouth once daily.    pen needle, diabetic 32 gauge x 5/32" Ndle Inject 4 times daily.    predniSONE (DELTASONE) 20 MG tablet Take 60 mg by mouth once daily.    predniSONE (DELTASONE) 5 MG tablet Take 1 tablet (5 mg total) by mouth once daily.    pregabalin (LYRICA ORAL) Take 150 mg by mouth 2 (two) times a day.    progesterone (PROMETRIUM) 200 MG capsule TAKE ONE CAPSULE BY MOUTH EVERY DAY (Patient taking differently: Take 200 mg by mouth every evening.)    sildenafil (REVATIO) 20 mg Tab Take 1 tablet (20 mg total) by mouth 3 (three) times daily.    sulfamethoxazole-trimethoprim 800-160mg (BACTRIM DS) 800-160 mg Tab Take 1 tablet by mouth every Mon, Wed, Fri.   Last reviewed on 2022  9:21 AM by Neris " PEDRO Bland MA    Review of patient's allergies indicates:  No Known Allergies Last reviewed on  8/26/2022 3:55 PM by [place] Johnson    Interventions added this encounter   Open: [facility] Patient Intervention - Drug safety: sildenafil (REVATIO) 20 mg Tab     Tasks added this encounter   11/24/2022 - Refill Call (Auto Added)   Tasks due within next 3 months   No tasks due.     Jackie Eugene, PharmD  [facility]  [address] [place] [id number]  Phone: [phone]  Fax: [phone]

## 2022-11-09 ENCOUNTER — TELEPHONE (OUTPATIENT)
Dept: TRANSPLANT | Facility: CLINIC | Age: 57
End: 2022-11-09
Payer: COMMERCIAL

## 2022-11-10 ENCOUNTER — HOSPITAL ENCOUNTER (OUTPATIENT)
Dept: PULMONOLOGY | Facility: CLINIC | Age: 57
Discharge: HOME | End: 2022-11-10
Payer: COMMERCIAL

## 2022-11-10 ENCOUNTER — OFFICE VISIT (OUTPATIENT)
Dept: TRANSPLANT | Facility: CLINIC | Age: 57
End: 2022-11-10
Payer: COMMERCIAL

## 2022-11-10 VITALS
SYSTOLIC BLOOD PRESSURE: 163 MMHG | HEART RATE: 76 BPM | OXYGEN SATURATION: 100 % | BODY MASS INDEX: 25.79 KG/M2 | HEIGHT: 66 IN | TEMPERATURE: 99 F | DIASTOLIC BLOOD PRESSURE: 100 MMHG | WEIGHT: 160.5 LBS

## 2022-11-10 VITALS — WEIGHT: 160.5 LBS | HEIGHT: 66 IN | BODY MASS INDEX: 25.79 KG/M2

## 2022-11-10 DIAGNOSIS — M32.13 DRUG-INDUCED SYSTEMIC LUPUS ERYTHEMATOSUS WITH LUNG INVOLVEMENT: ICD-10-CM

## 2022-11-10 DIAGNOSIS — J84.9 ILD (INTERSTITIAL LUNG DISEASE): ICD-10-CM

## 2022-11-10 DIAGNOSIS — I73.01 RAYNAUD'S DISEASE WITH GANGRENE: ICD-10-CM

## 2022-11-10 DIAGNOSIS — M32.0 DRUG-INDUCED SYSTEMIC LUPUS ERYTHEMATOSUS WITH LUNG INVOLVEMENT: ICD-10-CM

## 2022-11-10 DIAGNOSIS — M32.19 OTHER SYSTEMIC LUPUS ERYTHEMATOSUS WITH OTHER ORGAN INVOLVEMENT: ICD-10-CM

## 2022-11-10 DIAGNOSIS — I10 HTN (HYPERTENSION), BENIGN: ICD-10-CM

## 2022-11-10 DIAGNOSIS — J84.9 ILD (INTERSTITIAL LUNG DISEASE): Primary | ICD-10-CM

## 2022-11-10 DIAGNOSIS — J30.9 ALLERGIC RHINITIS, UNSPECIFIED SEASONALITY, UNSPECIFIED TRIGGER: ICD-10-CM

## 2022-11-10 PROCEDURE — 99999 PR PBB SHADOW E&M-EST. PATIENT-LVL III: CPT | Mod: PBBFAC,TXP,, | Performed by: INTERNAL MEDICINE

## 2022-11-10 PROCEDURE — 3080F PR MOST RECENT DIASTOLIC BLOOD PRESSURE >= 90 MM HG: ICD-10-PCS | Mod: CPTII,NTX,S$GLB, | Performed by: INTERNAL MEDICINE

## 2022-11-10 PROCEDURE — 3077F PR MOST RECENT SYSTOLIC BLOOD PRESSURE >= 140 MM HG: ICD-10-PCS | Mod: CPTII,NTX,S$GLB, | Performed by: INTERNAL MEDICINE

## 2022-11-10 PROCEDURE — 99214 OFFICE O/P EST MOD 30 MIN: CPT | Mod: NTX,S$GLB,, | Performed by: INTERNAL MEDICINE

## 2022-11-10 PROCEDURE — 3008F PR BODY MASS INDEX (BMI) DOCUMENTED: ICD-10-PCS | Mod: CPTII,NTX,S$GLB, | Performed by: INTERNAL MEDICINE

## 2022-11-10 PROCEDURE — 94726 PLETHYSMOGRAPHY LUNG VOLUMES: CPT | Mod: NTX,S$GLB,, | Performed by: INTERNAL MEDICINE

## 2022-11-10 PROCEDURE — 94729 PR C02/MEMBANE DIFFUSE CAPACITY: ICD-10-PCS | Mod: NTX,S$GLB,, | Performed by: INTERNAL MEDICINE

## 2022-11-10 PROCEDURE — 94618 PULMONARY STRESS TESTING: ICD-10-PCS | Mod: NTX,S$GLB,, | Performed by: INTERNAL MEDICINE

## 2022-11-10 PROCEDURE — 94618 PULMONARY STRESS TESTING: CPT | Mod: NTX,S$GLB,, | Performed by: INTERNAL MEDICINE

## 2022-11-10 PROCEDURE — 1160F RVW MEDS BY RX/DR IN RCRD: CPT | Mod: CPTII,NTX,S$GLB, | Performed by: INTERNAL MEDICINE

## 2022-11-10 PROCEDURE — 94010 BREATHING CAPACITY TEST: ICD-10-PCS | Mod: NTX,S$GLB,, | Performed by: INTERNAL MEDICINE

## 2022-11-10 PROCEDURE — 3008F BODY MASS INDEX DOCD: CPT | Mod: CPTII,NTX,S$GLB, | Performed by: INTERNAL MEDICINE

## 2022-11-10 PROCEDURE — 1160F PR REVIEW ALL MEDS BY PRESCRIBER/CLIN PHARMACIST DOCUMENTED: ICD-10-PCS | Mod: CPTII,NTX,S$GLB, | Performed by: INTERNAL MEDICINE

## 2022-11-10 PROCEDURE — 1159F MED LIST DOCD IN RCRD: CPT | Mod: CPTII,NTX,S$GLB, | Performed by: INTERNAL MEDICINE

## 2022-11-10 PROCEDURE — 99214 PR OFFICE/OUTPT VISIT, EST, LEVL IV, 30-39 MIN: ICD-10-PCS | Mod: NTX,S$GLB,, | Performed by: INTERNAL MEDICINE

## 2022-11-10 PROCEDURE — 94010 BREATHING CAPACITY TEST: CPT | Mod: NTX,S$GLB,, | Performed by: INTERNAL MEDICINE

## 2022-11-10 PROCEDURE — 94726 PULM FUNCT TST PLETHYSMOGRAP: ICD-10-PCS | Mod: NTX,S$GLB,, | Performed by: INTERNAL MEDICINE

## 2022-11-10 PROCEDURE — 3080F DIAST BP >= 90 MM HG: CPT | Mod: CPTII,NTX,S$GLB, | Performed by: INTERNAL MEDICINE

## 2022-11-10 PROCEDURE — 94729 DIFFUSING CAPACITY: CPT | Mod: NTX,S$GLB,, | Performed by: INTERNAL MEDICINE

## 2022-11-10 PROCEDURE — 1159F PR MEDICATION LIST DOCUMENTED IN MEDICAL RECORD: ICD-10-PCS | Mod: CPTII,NTX,S$GLB, | Performed by: INTERNAL MEDICINE

## 2022-11-10 PROCEDURE — 99999 PR PBB SHADOW E&M-EST. PATIENT-LVL III: ICD-10-PCS | Mod: PBBFAC,TXP,, | Performed by: INTERNAL MEDICINE

## 2022-11-10 PROCEDURE — 3077F SYST BP >= 140 MM HG: CPT | Mod: CPTII,NTX,S$GLB, | Performed by: INTERNAL MEDICINE

## 2022-11-10 RX ORDER — AZELASTINE 1 MG/ML
1 SPRAY, METERED NASAL 2 TIMES DAILY
Qty: 30 ML | Refills: 11 | Status: SHIPPED | OUTPATIENT
Start: 2022-11-10 | End: 2024-01-19

## 2022-11-10 NOTE — LETTER
November 14, 2022        [place]  [address]  [address]  [place] [address]  Phone: [phone]  Fax: [phone]             [facility]  [address]  [facility] [address]  Phone: [phone]   Patient: Christen Bowman   MR Number: [id number]   YOB: 1965   Date of Visit: 11/10/2022       Dear Dr. [place] Johnson    Thank you for referring Christen Bowman to me for evaluation. Attached you will find relevant portions of my assessment and plan of care.    If you have questions, please do not hesitate to call me. I look forward to following Christen Bowman along with you.    Sincerely,    Tamara Katz, DO    Enclosure    If you would like to receive this communication electronically, please contact [email or web address] or [phone] to request [employer] access.    [employer] is a tool which provides read-only access to select patient information with whom you have a relationship. Its easy to use and provides real time access to review your patients record including encounter summaries, notes, results, and demographic information.    If you feel you have received this communication in error or would no longer like to receive these types of communications, please e-mail [email or web address]

## 2022-11-11 NOTE — PROCEDURES
Christen Bowman is a 57 y.o.  female patient, who presents for a 6 minute walk test ordered by DO Sterling.  The diagnosis is Interstitial Lung Disease.  The patient's BMI is 25.9 kg/m2.  Predicted distance (lower limit of normal) is 395.73 meters.      Test Results:    The test was completed without stopping.  The total time walked was 360 seconds.  During walking, the patient reported:  Lightheadedness, Leg pain. The patient used no assistive devices during testing.     11/10/2022---------Distance: 527.91 meters (1732 feet)     O2 Sat % Supplemental Oxygen Heart Rate Blood Pressure Dann Scale   Pre-exercise  (Resting) 100 % Room Air 86 bpm 154/87 mmHg 0   During Exercise 97 % Room Air 130 bpm 183/93 mmHg 3   Post-exercise  (Recovery) 100 % Room Air  94 bpm 163/83 mmHg      Recovery Time: 192 seconds    Performing nurse/tech: CINTHIA Parrish      PREVIOUS STUDY:   05/05/2022---------Distance: 487.68 meters (1600 feet)       O2 Sat % Supplemental Oxygen Heart Rate Blood Pressure Dann Scale   Pre-exercise  (Resting) 98 % Room Air 79 bpm 141/85 mmHg 0   During Exercise 98 % Room Air 108 bpm 192/96 mmHg 3   Post-exercise  (Recovery) 99 % Room Air  83 bpm 153/82 mmHg         CLINICAL INTERPRETATION:  Six minute walk distance is 527.91 meters (1732 feet) with moderate dyspnea.  During exercise, there was no significant desaturation while breathing room air.  Both blood pressure and heart rate increased significantly with walking.  Hypertension was present prior to exercise.  The patient reported non-pulmonary symptoms during exercise.  Since the previous study in May 2022, exercise capacity is unchanged.  Based upon age and body mass index, exercise capacity is normal.

## 2022-11-13 LAB
DLCO SINGLE BREATH LLN: 18.98
DLCO SINGLE BREATH PRE REF: 62.8 %
DLCO SINGLE BREATH REF: 24.72
DLCOC SBVA LLN: 3.29
DLCOC SBVA REF: 4.69
DLCOC SINGLE BREATH LLN: 18.98
DLCOC SINGLE BREATH REF: 24.72
DLCOCSBVAULN: 6.09
DLCOCSINGLEBREATHULN: 30.45
DLCOSINGLEBREATHULN: 30.45
DLCOVA LLN: 3.29
DLCOVA PRE REF: 94.1 %
DLCOVA PRE: 4.41 ML/(MIN*MMHG*L) (ref 3.29–6.09)
DLCOVA REF: 4.69
DLCOVAULN: 6.09
ERV LLN: -16449.1
ERV PRE REF: 96.5 %
ERV REF: 0.9
ERVULN: ABNORMAL
FEF 25 75 LLN: 1.04
FEF 25 75 PRE REF: 57.9 %
FEF 25 75 REF: 2.28
FEV05 LLN: 1.15
FEV05 REF: 2.01
FEV1 FVC LLN: 69
FEV1 FVC PRE REF: 90.5 %
FEV1 FVC REF: 80
FEV1 LLN: 1.79
FEV1 PRE REF: 77.2 %
FEV1 REF: 2.41
FRCPLETH LLN: 1.99
FRCPLETH PREREF: 76.9 %
FRCPLETH REF: 2.81
FRCPLETHULN: 3.63
FVC LLN: 2.27
FVC PRE REF: 84.8 %
FVC REF: 3.03
IVC PRE: 2.54 L (ref 2.27–3.82)
IVC SINGLE BREATH LLN: 2.27
IVC SINGLE BREATH PRE REF: 83.7 %
IVC SINGLE BREATH REF: 3.03
IVCSINGLEBREATHULN: 3.82
LLN IC: -16447.68
PEF LLN: 4.12
PEF PRE REF: 83.3 %
PEF REF: 6.25
PHYSICIAN COMMENT: ABNORMAL
PRE DLCO: 15.52 ML/(MIN*MMHG) (ref 18.98–30.45)
PRE ERV: 0.86 L (ref -16449.1–16450.9)
PRE FEF 25 75: 1.32 L/S (ref 1.04–3.99)
PRE FET 100: 6.19 SEC
PRE FEV05 REF: 71.3 %
PRE FEV1 FVC: 72.45 % (ref 68.82–89.57)
PRE FEV1: 1.86 L (ref 1.79–3.01)
PRE FEV5: 1.43 L (ref 1.15–2.86)
PRE FRC PL: 2.16 L (ref 1.99–3.63)
PRE FVC: 2.57 L (ref 2.27–3.82)
PRE IC: 1.7 L (ref -16447.68–16452.32)
PRE PEF: 5.21 L/S (ref 4.12–8.38)
PRE REF IC: 73.5 %
PRE RV: 1.3 L (ref 1.34–2.49)
PRE TLC: 3.87 L (ref 4.29–6.26)
PRE VTG: 2.23 L
RAW PRE REF: 86.5 %
RAW PRE: 2.65 CMH2O*S/L (ref 3.06–3.06)
RAW REF: 3.06
REF IC: 2.32
RV LLN: 1.34
RV PRE REF: 67.8 %
RV REF: 1.91
RVTLC LLN: 28
RVTLC PRE REF: 89.1 %
RVTLC PRE: 33.56 % (ref 28.07–47.25)
RVTLC REF: 38
RVTLCULN: 47
RVULN: 2.49
SGAW PRE REF: 141.4 %
SGAW PRE: 0.14 1/(CMH2O*S) (ref 0.1–0.1)
SGAW REF: 0.1
TLC LLN: 4.29
TLC PRE REF: 73.3 %
TLC REF: 5.27
TLC ULN: 6.26
ULN IC: ABNORMAL
VA PRE: 3.52 L (ref 5.12–5.12)
VA SINGLE BREATH LLN: 5.12
VA SINGLE BREATH PRE REF: 68.7 %
VA SINGLE BREATH REF: 5.12
VASINGLEBREATHULN: 5.12
VC LLN: 2.27
VC PRE REF: 84.8 %
VC PRE: 2.57 L (ref 2.27–3.82)
VC REF: 3.03
VC ULN: 3.82

## 2022-11-14 NOTE — PROGRESS NOTES
ADVANCED LUNG DISEASE CLINIC INITIAL EVALUATION                                                                                                                                           Reason for Visit:  SLE with lung involvement, ILD    Referring Physician: Laurel Johnson MD    History of Present Illness: Christen Bowman is a 57 y.o. female who is on 0L of oxygen.  She is on no assisted ventilation.  Her New York Heart Association Class is I and a Karnofsky score of 90% - Able to carry on normal activity: minor symptoms of disease. She is not diabetic.     Followed for SLE-ILD.  Currently on MMF and plaquenil.  Takes verapamil and sildenafil for Raynauds.  Does have some very mild dyspnea with heavy exertion.  No fevers, chills, cough, BLE, and no hx of PH.  Remains very active and takes all medications as directed.  Complains of some HAs and occasional episodes of dizziness over the last few weeks.  BP is uncontrolled and she has not seen her PCP regarding the issue.  No issues on exam.          Past Medical History:   Diagnosis Date    Essential hypertension 12/23/2019 2:02:07 PM    [facility] Historical - Cardiovascular: Hypertension-No Additional Notes    Essential hypertension 12/23/2019 2:02:07 PM    [facility] Historical - Cardiovascular: Hypertension-No Additional Notes    Raynaud's disease     Rheumatoid arteritis     Systemic lupus erythematosus        Past Surgical History:   Procedure Laterality Date    SPLENECTOMY      TONSILLECTOMY      TUBAL LIGATION         Allergies: Patient has no known allergies.    Current Outpatient Medications   Medication Sig    aspirin (ECOTRIN) 81 MG EC tablet Take 81 mg by mouth once daily.    azelastine (ASTELIN) 137 mcg (0.1 %) nasal spray 1 spray (137 mcg total) by Nasal route 2 (two) times daily.    diclofenac sodium (VOLTAREN) 1 % Gel apply TWO grams topically FOUR times a DAY    estradioL (ESTRACE) 1 MG tablet TAKE ONE TABLET BY MOUTH EVERY DAY     hydrOXYchloroQUINE (PLAQUENIL) 200 mg tablet Take 1 tablet (200 mg total) by mouth 2 (two) times daily.    levothyroxine (SYNTHROID) 50 MCG tablet Take 1 tablet (50 mcg total) by mouth every morning.    mycophenolate (CELLCEPT) 500 mg Tab Take 3 tablets (1,500 mg total) by mouth 2 (two) times daily.    NIFEdipine (ADALAT CC) 30 MG TbSR Take 2 tablets (60 mg total) by mouth once daily.    pregabalin (LYRICA ORAL) Take 150 mg by mouth 2 (two) times a day.    progesterone (PROMETRIUM) 200 MG capsule TAKE ONE CAPSULE BY MOUTH EVERY DAY (Patient taking differently: Take 200 mg by mouth every evening.)    sildenafil (REVATIO) 20 mg Tab Take 1 tablet (20 mg total) by mouth 3 (three) times daily.     No current facility-administered medications for this visit.       Immunization History   Administered Date(s) Administered    COVID-19, MRNA, LN-S, PF (Pfizer) (Gray Cap) 03/21/2022    COVID-19, MRNA, LN-S, PF (Pfizer) (Purple Cap) 08/19/2021, 09/09/2021    Influenza - Quadrivalent - PF *Preferred* (6 months and older) 09/22/2013, 10/17/2014, 02/11/2021    Pneumococcal Polysaccharide - 23 Valent 10/17/2014     Family History:    Family History   Problem Relation Age of Onset    Hypertension Mother     Lupus Mother     Cancer Mother     Hypertension Father     Anuerysm Father      Social History     Substance and Sexual Activity   Alcohol Use Not Currently      Social History     Substance and Sexual Activity   Drug Use Never      Social History     Socioeconomic History    Marital status:    Tobacco Use    Smoking status: Never     Passive exposure: Past    Smokeless tobacco: Never   Substance and Sexual Activity    Alcohol use: Not Currently    Drug use: Never     Review of Systems   Constitutional: Negative.  Negative for chills, diaphoresis, fever, malaise/fatigue and weight loss.   HENT:  Negative for congestion, ear pain, nosebleeds and sinus pain.    Eyes:  Negative for blurred vision, photophobia and pain.  "  Respiratory:  Negative for cough, hemoptysis, sputum production, shortness of breath and wheezing.    Cardiovascular:  Negative for chest pain, palpitations, claudication and leg swelling.   Gastrointestinal:  Negative for constipation, diarrhea, heartburn, nausea and vomiting.   Genitourinary:  Negative for frequency and urgency.   Musculoskeletal:  Negative for falls, joint pain and myalgias.   Skin:  Negative for rash.   Neurological:  Positive for tremors. Negative for dizziness, focal weakness, weakness and headaches.   Endo/Heme/Allergies:  Negative for environmental allergies and polydipsia. Does not bruise/bleed easily.   Psychiatric/Behavioral:  Negative for depression. The patient is not nervous/anxious.    Vitals  BP (!) 163/100 (BP Location: Right arm, Patient Position: Sitting, BP Method: Medium (Automatic))   Pulse 76   Temp 98.5 °F (36.9 °C) (Oral)   Ht 5' 6" (1.676 m)   Wt 72.8 kg (160 lb 7.9 oz)   SpO2 100%   BMI 25.90 kg/m²   Physical Exam  Vitals and nursing note reviewed.   Constitutional:       General: She is not in acute distress.     Appearance: Normal appearance. She is well-developed. She is not diaphoretic.   HENT:      Head: Normocephalic and atraumatic.      Nose: Nose normal.      Mouth/Throat:      Pharynx: No oropharyngeal exudate.   Eyes:      General: No scleral icterus.     Extraocular Movements: Extraocular movements intact.      Conjunctiva/sclera: Conjunctivae normal.      Pupils: Pupils are equal, round, and reactive to light.   Neck:      Thyroid: No thyromegaly.      Vascular: No JVD.      Trachea: No tracheal deviation.   Cardiovascular:      Rate and Rhythm: Normal rate and regular rhythm.      Heart sounds: Normal heart sounds. No murmur heard.    No friction rub. No gallop.   Pulmonary:      Effort: Pulmonary effort is normal. No respiratory distress.      Breath sounds: Normal breath sounds. No stridor. No wheezing, rhonchi or rales.   Chest:      Chest wall: No " tenderness.   Abdominal:      General: Bowel sounds are normal. There is no distension.      Palpations: Abdomen is soft.      Tenderness: There is no abdominal tenderness.   Musculoskeletal:         General: No deformity. Normal range of motion.      Cervical back: Normal range of motion and neck supple.   Skin:     General: Skin is warm and dry.      Capillary Refill: Capillary refill takes less than 2 seconds.      Coloration: Skin is not pale.      Findings: No erythema or rash.   Neurological:      Mental Status: She is alert and oriented to person, place, and time.      Cranial Nerves: No cranial nerve deficit.   Psychiatric:         Mood and Affect: Mood normal.         Judgment: Judgment normal.       Labs:      Pulmonary Function Tests 11/10/2022 5/5/2022   FVC 2.57 2.63   FEV1 1.86 1.93   TLC (liters) 3.87 4.12   DLCO (ml/mmHg sec) 15.52 15.5   FVC% 85 86.5   FEV1% 77 79.8   FEF 25-75 1.32 1.26   FEF 25-75% 58 54.6   TLC% 74 78.2   RV 1.3 1.49   RV% 68 77.9   DLCO% 62 15.5     6MW 11/10/2022 5/5/2022   6MWT Status completed without stopping completed without stopping   Patient Reported Lightheadedness;Leg pain Leg pain   Was O2 used? No No   6MW Distance walked (feet) 1732 1600   Distance walked (meters) 527.91 487.68   Did patient stop? No No   Oxygen Saturation 100 98   Supplemental Oxygen Room Air Room Air   Heart Rate 86 79   Blood Pressure 154/87 141/85   Dann Dyspnea Rating  nothing at all nothing at all   Oxygen Saturation 97 98   Supplemental Oxygen Room Air Room Air   Heart Rate 130 108   Blood Pressure 183/93 192/96   Dann Dyspnea Rating  moderate moderate   Recovery Time (seconds) 192 197   Oxygen Saturation 100 99   Supplemental Oxygen Room Air Room Air   Heart Rate 94 83       Imaging:  Results for orders placed during the hospital encounter of 02/05/21    X-Ray Chest PA And Lateral    Narrative  EXAMINATION:  XR CHEST PA AND LATERAL    CLINICAL HISTORY:  leukocytosis;    TECHNIQUE:  PA and  lateral views of the chest were performed.    COMPARISON:  None.    FINDINGS:  Mediastinal structures are midline.  Hilar contours are unremarkable.  Lung volumes are symmetric.  No definite consolidation.  Ill-defined reticular opacities project over the left lower lung zone.  No consolidation.  No pneumothorax or large pleural effusion.  No free air beneath the diaphragm.  No acute osseous abnormalities.  Visualized soft tissues are within normal limits.    Impression  1. Ill-defined reticular opacities projecting over the left lower lung zone, possibly related to scarring.      Electronically signed by: Chan Brown MD  Date:    02/06/2021  Time:    13:05    Results for orders placed during the hospital encounter of 02/05/21    CT Chest Without Contrast    Narrative  EXAMINATION:  CT CHEST WITHOUT CONTRAST    CLINICAL HISTORY:  HRCT please. CT with Mosaic attenuation, left sided reticular opacification with fibrotic appearance and thin-walled cystic changes.  Additionally there are subcentimeter nodular opacities;    TECHNIQUE:  Low dose axial images, sagittal and coronal reformations were obtained from the thoracic inlet to the lung bases. Contrast was not administered.    X-ray dose: DLP= 161.76 mGy-cm    COMPARISON:  CTA abdomen pelvis 02/08/2021    Chest radiograph 02/06/2021    FINDINGS:  Base of Neck:  No significant abnormality.    Thoracic soft tissues:  No significant abnormality.    Aorta: Left-sided aortic arch with normal caliber, contour, and course with no significant atherosclerotic calcifications.  Common origin of the brachiocephalic and left common carotid arteries.    Heart: Normal size.  No significant coronary artery calcific atherosclerosis.    Pericardium: No pericardial fluid or pericardial calcification.    Pulmonary vasculature: Pulmonary arteries distribute normally. There are four pulmonary veins that return to the left atrium.    Xenia/Mediastinum:  No lymph node enlargement on this  noncontrast CT. Hilar contours are unremarkable.    Airways:  Trachea is midline and proximal airways are patent without significant abnormality.    Pleura: No pleural thickening, pleural calcification, or pleural fluid.  No pneumothorax.    Lungs:    -Scattered bilateral pulmonary nodules, with the largest on the right a pleural based nodule in the apical segment of the right upper lobe measuring 0.6 cm (axial series 4, image 117) and the largest on the left in the anteromedial basal segment of the left lower lobe measuring 0.3 cm (axial series 4, image 295).    -Subpleural reticular opacities and traction bronchiectasis, most prominent in the basilar segments of the left lower lobe and to a lesser extent the basilar segments of the right lower lobe.  Honeycombing is present in the left lower lobe.    -Subcentimeter densely calcified granuloma in the posterior basal segment of the left lower lobe reflects remote infection.    Esophagus: Normal in course and caliber.    Upper abdomen: No acute intra-abdominal abnormality.  Spleen is surgically absent.    Bones:  Degenerative changes of the visualized osseous structures without acute fracture or lytic or sclerotic lesions.    Impression  1. There are subpleural reticular opacities and traction bronchiectasis, most prominent in the basilar segments of the left lower lobe and to a lesser extent in the basilar segments of the right lower lobe.  Honeycombing is present in the left lower lobe.  Findings suggest interstitial lung disease, likely UIP versus NSIP.  2. Scattered bilateral pulmonary nodules measuring up to 0.6 cm.  For multiple solid nodules with any 6 mm or greater, Fleischner Society guidelines recommend follow up with non-contrast chest CT at 3-6 months (doing 05/10/2021 and 08/10/2021) and 18-24 months (between 08/10/2022 and 02/10/2023) after discovery.  3. Subcentimeter densely calcified granuloma in the posterior basal segment of the left lower lobe  reflects remote infection.    Electronically signed by resident: Celso Butler  Date:    02/10/2021  Time:    11:20    Electronically signed by: Yoselyn Vasquez MD  Date:    02/10/2021  Time:    11:41      Cardiodiagnostics:  Results for orders placed during the hospital encounter of 02/05/21    Echo Color Flow Doppler? Yes    Interpretation Summary  · Tachycardia was present during the study  · The left ventricle is normal in size with concentric remodeling and hyperdynamic systolic function. The estimated ejection fraction is 75%  · Normal left ventricular diastolic function.  · Normal right ventricular size with normal right ventricular systolic function.  · The estimated PA systolic pressure is 21 mmHg.  · Normal central venous pressure (3 mmHg).        Assessment:  1. ILD (interstitial lung disease)    2. Other systemic lupus erythematosus with other organ involvement    3. Raynaud's disease with gangrene    4. HTN (hypertension), benign    5. Allergic rhinitis, unspecified seasonality, unspecified trigger      Plan:   Followed for SLE-ILD.  CT imaging consistent with NSIP and less likely UIP.  FVC and DLCO stable.  6MWT without desaturations on RA; TTE does not show evidence of PH (sildenafil for Raynaud's and not PH).  Overall stable from a respiratory standpoint.  No progression to warrant anti-fibrotic therapy.  Followed with Dr. Johnson for SLE.  Currently on MMF and plaquenil.  Continue with current therapy.  On Sildenafil and Verapamil for Raynaud's.  Continue with current therapy.  Has HTN which appears uncontrolled given BP today and reports of occasional HA and dizziness.  She is instructed to keep a home log and contact her PCP ASAP.  If she develops symptoms of HA and dizziness again, she is to go the the ED immediately.  She verbalizes understanding.    Continue with nasal steroid and antihistamine for allergic rhinitis.  Follow-up in 6 months with repeat PFTs.      Tamara Katz  BREANNE  Pulmonary/Critical Care and Lung Transplantation  [facility]

## 2022-11-25 ENCOUNTER — SPECIALTY PHARMACY (OUTPATIENT)
Dept: PHARMACY | Facility: CLINIC | Age: 57
End: 2022-11-25
Payer: COMMERCIAL

## 2022-11-25 NOTE — TELEPHONE ENCOUNTER
Specialty Pharmacy - Refill Coordination    Specialty Medication Orders Linked to Encounter      Flowsheet Row Most Recent Value   Medication #1 sildenafil (REVATIO) 20 mg Tab (Order#[id number], Rx#[id number])            Refill Questions - Documented Responses      Flowsheet Row Most Recent Value   Patient Availability and HIPAA Verification    Does patient want to proceed with activity? Yes   HIPAA/medical authority confirmed? Yes   Relationship to patient of person spoken to? Self   Refill Screening Questions    Changes to allergies? No   Changes to medications? No   New conditions since last clinic visit? No   Unplanned office visit, urgent care, ED, or hospital admission in the last 4 weeks? No   How does patient/caregiver feel medication is working? Good   Financial problems or insurance changes? No   How many doses of your specialty medications were missed in the last 4 weeks? 0   Would patient like to speak to a pharmacist? No   When does the patient need to receive the medication? 12/01/22   Refill Delivery Questions    How will the patient receive the medication? Mail   When does the patient need to receive the medication? 12/01/22   Shipping Address Home   Address in [facility] confirmed and updated if neccessary? Yes   Expected Copay ($) 16.67   Payment Method CC on file   Days supply of Refill 30   Supplies needed? No supplies needed   Refill activity completed? Yes   Refill activity plan Refill scheduled   Shipment/Pickup Date: 11/28/22            Current Outpatient Medications   Medication Sig    aspirin (ECOTRIN) 81 MG EC tablet Take 81 mg by mouth once daily.    azelastine (ASTELIN) 137 mcg (0.1 %) nasal spray 1 spray (137 mcg total) by Nasal route 2 (two) times daily.    diclofenac sodium (VOLTAREN) 1 % Gel apply TWO grams topically FOUR times a DAY    estradioL (ESTRACE) 1 MG tablet TAKE ONE TABLET BY MOUTH EVERY DAY    hydrOXYchloroQUINE (PLAQUENIL) 200 mg tablet Take 1 tablet (200 mg  total) by mouth 2 (two) times daily.    levothyroxine (SYNTHROID) 50 MCG tablet Take 1 tablet (50 mcg total) by mouth every morning.    mycophenolate (CELLCEPT) 500 mg Tab Take 3 tablets (1,500 mg total) by mouth 2 (two) times daily.    NIFEdipine (ADALAT CC) 30 MG TbSR Take 2 tablets (60 mg total) by mouth once daily.    pregabalin (LYRICA ORAL) Take 150 mg by mouth 2 (two) times a day.    progesterone (PROMETRIUM) 200 MG capsule TAKE ONE CAPSULE BY MOUTH EVERY DAY (Patient taking differently: Take 200 mg by mouth every evening.)    sildenafil (REVATIO) 20 mg Tab Take 1 tablet (20 mg total) by mouth 3 (three) times daily.   Last reviewed on 11/14/2022  8:28 AM by Tamara Katz, DO    Review of patient's allergies indicates:  No Known Allergies Last reviewed on  11/14/2022 8:28 AM by Tamara Katz      Tasks added this encounter   12/26/2022 - Refill Call (Auto Added)   Tasks due within next 3 months   No tasks due.     Jesse, Pharm[facility]  [address] [place], [place] [address]  [facility] [address]  Phone: [phone]  Fax: [phone]

## 2022-11-29 ENCOUNTER — PATIENT MESSAGE (OUTPATIENT)
Dept: RHEUMATOLOGY | Facility: CLINIC | Age: 57
End: 2022-11-29
Payer: COMMERCIAL

## 2022-12-09 ENCOUNTER — LAB VISIT (OUTPATIENT)
Dept: LAB | Facility: HOSPITAL | Age: 57
End: 2022-12-09
Attending: INTERNAL MEDICINE
Payer: COMMERCIAL

## 2022-12-09 ENCOUNTER — OFFICE VISIT (OUTPATIENT)
Dept: RHEUMATOLOGY | Facility: CLINIC | Age: 57
End: 2022-12-09
Payer: COMMERCIAL

## 2022-12-09 VITALS
HEIGHT: 66 IN | BODY MASS INDEX: 26.43 KG/M2 | DIASTOLIC BLOOD PRESSURE: 105 MMHG | SYSTOLIC BLOOD PRESSURE: 175 MMHG | HEART RATE: 68 BPM | WEIGHT: 164.44 LBS

## 2022-12-09 DIAGNOSIS — I73.01 RAYNAUD'S DISEASE WITH GANGRENE: Primary | ICD-10-CM

## 2022-12-09 DIAGNOSIS — I73.01 RAYNAUD'S DISEASE WITH GANGRENE: ICD-10-CM

## 2022-12-09 LAB
BACTERIA #/AREA URNS AUTO: ABNORMAL /HPF
BILIRUB UR QL STRIP: NEGATIVE
CLARITY UR REFRACT.AUTO: CLEAR
COLOR UR AUTO: YELLOW
CREAT UR-MCNC: 139 MG/DL (ref 15–325)
GLUCOSE UR QL STRIP: NEGATIVE
HGB UR QL STRIP: NEGATIVE
HYALINE CASTS UR QL AUTO: 2 /LPF
KETONES UR QL STRIP: NEGATIVE
LEUKOCYTE ESTERASE UR QL STRIP: ABNORMAL
MICROSCOPIC COMMENT: ABNORMAL
NITRITE UR QL STRIP: POSITIVE
PH UR STRIP: 6 [PH] (ref 5–8)
PROT UR QL STRIP: ABNORMAL
PROT UR-MCNC: <7 MG/DL (ref 0–15)
PROT/CREAT UR: NORMAL MG/G{CREAT} (ref 0–0.2)
SP GR UR STRIP: 1.02 (ref 1–1.03)
SQUAMOUS #/AREA URNS AUTO: 2 /HPF
URN SPEC COLLECT METH UR: ABNORMAL
WBC #/AREA URNS AUTO: 13 /HPF (ref 0–5)

## 2022-12-09 PROCEDURE — 99214 OFFICE O/P EST MOD 30 MIN: CPT | Mod: NTX,S$GLB,, | Performed by: INTERNAL MEDICINE

## 2022-12-09 PROCEDURE — 82570 ASSAY OF URINE CREATININE: CPT | Mod: TXP | Performed by: INTERNAL MEDICINE

## 2022-12-09 PROCEDURE — 81001 URINALYSIS AUTO W/SCOPE: CPT | Mod: TXP | Performed by: INTERNAL MEDICINE

## 2022-12-09 PROCEDURE — 99213 OFFICE O/P EST LOW 20 MIN: CPT | Mod: PBBFAC,NTX | Performed by: INTERNAL MEDICINE

## 2022-12-09 PROCEDURE — 99999 PR PBB SHADOW E&M-EST. PATIENT-LVL III: CPT | Mod: PBBFAC,TXP,, | Performed by: INTERNAL MEDICINE

## 2022-12-09 PROCEDURE — 99999 PR PBB SHADOW E&M-EST. PATIENT-LVL III: ICD-10-PCS | Mod: PBBFAC,TXP,, | Performed by: INTERNAL MEDICINE

## 2022-12-09 PROCEDURE — 99214 PR OFFICE/OUTPT VISIT, EST, LEVL IV, 30-39 MIN: ICD-10-PCS | Mod: NTX,S$GLB,, | Performed by: INTERNAL MEDICINE

## 2022-12-09 RX ORDER — HYDROXYCHLOROQUINE SULFATE 200 MG/1
200 TABLET, FILM COATED ORAL 2 TIMES DAILY
Qty: 180 TABLET | Refills: 3 | Status: SHIPPED | OUTPATIENT
Start: 2022-12-09 | End: 2023-11-29 | Stop reason: SDUPTHER

## 2022-12-09 RX ORDER — SILDENAFIL CITRATE 20 MG/1
20 TABLET ORAL 3 TIMES DAILY
Qty: 90 TABLET | Refills: 11 | Status: SHIPPED | OUTPATIENT
Start: 2022-12-09 | End: 2023-10-05 | Stop reason: SDUPTHER

## 2022-12-09 RX ORDER — SILDENAFIL CITRATE 20 MG/1
20 TABLET ORAL 3 TIMES DAILY
Qty: 90 TABLET | Refills: 11 | Status: SHIPPED | OUTPATIENT
Start: 2022-12-09 | End: 2022-12-09 | Stop reason: SDUPTHER

## 2022-12-09 NOTE — PROGRESS NOTES
Rapid3 Question Responses and Scores 12/9/2022   MDHAQ Score 0.4   Psychologic Score 2.2   Pain Score 0.5   When you awakened in the morning OVER THE LAST WEEK, did you feel stiff? Yes   If Yes, please indicate the number of hours until you are as limber as you will be for the day 1   Fatigue Score 0.5   Global Health Score 0.5   RAPID3 Score 0.78     Answers submitted by the patient for this visit:  Rheumatology Questionnaire (Submitted on 12/9/2022)  fever: No  eye redness: No  mouth sores: No  headaches: Yes  shortness of breath: No  chest pain: No  trouble swallowing: No  diarrhea: No  constipation: No  unexpected weight change: No  genital sore: No  dysuria: No  During the last 3 days, have you had a skin rash?: No  Bruises or bleeds easily: No  cough: No

## 2022-12-09 NOTE — PROGRESS NOTES
Subjective:       Patient ID: Christen Bowman is a 55 y.o. female.    Chief Complaint: No chief complaint on file.    HPI 55 year old F with PMH of HTN, hypothyroidism, SLE, RA here to establish care. She was diagnosed with SLE in 1996  when she presented with +KENNY, arthritis , rash, raynauds,pleurisy (pericarditis),  and thrombocytopenia.  She reports she came in to hospital due to chest pain and pain/swelling in hands and elbows.  She was told to have pericardial effusion.   She was hospitalized for thrombocytopenia around age 22 and was treated with high dose steroids and Iv IG/plasmapheresis. She required splenectomy in   Due to thrombocytopenia.  She had one first trimester pregnancy loss. She was initially put on plaquenil when she was diagnosed but it was stopped at some point.        About a year ago (1/2020), she developed aphasia/confusion and had CVA. She was treated with high doses of steroids and Iv IG but no anticoagulation.  Since the stroke, she is more forgetful.        She as recently hospitalized in outside hospital for arthritis, raynauds causing ulcerations with digital ischemia and bacteremia.   It appears on December 12,2020, she developed URI symptoms (covid test-negative). She was having sinus drainage. She went to urgent care.  She went home and the next day,she noticed raynauds in hands and feet.  Within a matter of hours, she developed the blisters in lower extremities.   She was noted to have platelets of 5000 and creat 1.6 (baseline 0.64). White count had increased to 30 thousand. During admission, she developed severe ulcerations in her lower legs and toes and gangrene developed in toes. She was treated with high doses of steroids. She was also treated with antibiotics for gram positive cocci in blood cultures. Apparently, she also had bone marrow biopsy which did not show any significant changes and  BALDO did not show endocarditis.  During hospitalization, patient developed worsening  ulcerations and gangrene of multiple toes. She had lower extremity US which did not show DVT and was not put on anticoagulation. She was discharged on prednisone 80mg daily, mtx 10mg po week, diltiazem 360mg ER, nystatin, tylenol-3, estradiol and HCTZ 25mg poqday.        She is on clindamycin 300mg po q8 hours , prednisone 10mg a day, MTX 4 pills once a week, folic acid, oxycodone 10/325mg po qhs. She saw Dr. Yash Santana about 2 weeks ago. At that time, she was put on clindamycin. She saw orthopedic who is considering amputation.  Denies fevers, pleurisy, sob, alopecia. Reports raynauds in hands and feet.    Interval history: She has not been in the hospital. She is off prednisone.  She is taking cellcept 1500mg po BID.She is on sildenafil TID. She is taking nifedipene  60mg po qhs..  She is taking plaquenil 200mg po BID. Denies any rashes, oral ulcers, hair loss, joint pain or swelling.    Past Medical History:   Diagnosis Date    Raynaud's disease     Rheumatoid arteritis     Systemic lupus erythematosus    ,  Review of Systems   Constitutional: Negative for activity change, appetite change, chills, diaphoresis, fatigue and fever.   HENT: Negative for congestion, ear discharge, ear pain, facial swelling, mouth sores, sinus pressure, sneezing, sore throat, tinnitus and trouble swallowing.    Eyes: Negative for photophobia, pain, discharge, redness, itching and visual disturbance.   Respiratory: Negative for apnea, chest tightness, shortness of breath, wheezing and stridor.    Cardiovascular: Negative for leg swelling.   Gastrointestinal: Negative for abdominal distention, abdominal pain, anal bleeding, blood in stool, constipation, diarrhea and nausea.   Endocrine: Negative for cold intolerance and heat intolerance.   Genitourinary: Negative for difficulty urinating and dysuria.   Musculoskeletal: Negative for arthralgias, back pain, gait problem, joint swelling, myalgias, neck pain and neck stiffness.   Skin:  Negative for color change, pallor, rash and wound.   Neurological: Negative for dizziness, seizures, light-headedness and numbness.   Hematological: Negative for adenopathy. Does not bruise/bleed easily.   Psychiatric/Behavioral: Negative for sleep disturbance. The patient is not nervous/anxious.              Objective:   There were no vitals taken for this visit.   BP: 142/98  Physical Exam   Constitutional: She is oriented to person, place, and time.   HENT:   Head: Normocephalic and atraumatic.   Right Ear: External ear normal.   Left Ear: External ear normal.   Nose: Nose normal.   Mouth/Throat: Oropharynx is clear and moist. No oropharyngeal exudate.   Eyes: Conjunctivae and EOM are normal. Pupils are equal, round, and reactive to light. Right eye exhibits no discharge. Left eye exhibits no discharge. No scleral icterus.   Neck: No JVD present. No thyromegaly present.   Cardiovascular: Normal rate, regular rhythm, normal heart sounds and intact distal pulses.  Exam reveals no gallop and no friction rub.    No murmur heard.  Pulmonary/Chest: Effort normal and breath sounds normal. No respiratory distress. She has no wheezes. She has no rales. She exhibits no tenderness.   Abdominal: Soft. Bowel sounds are normal. She exhibits no distension and no mass. There is no abdominal tenderness. There is no rebound and no guarding.   Lymphadenopathy:     She has no cervical adenopathy.   Neurological: She is alert and oriented to person, place, and time. No cranial nerve deficit. Gait normal. Coordination normal.   Skin: Skin is dry. No rash noted. No erythema. No pallor.     Psychiatric: Affect and judgment normal.   Musculoskeletal: Tenderness and edema present. No deformity.      Comments: Gangrenous digits in left foot;   Dark Discoloration in second through fourth toes on right    Lower shins wrapped      labs: 3/3/2021  Wbc-12.6  Hematocrit-37  Plate-389  Labs 2/3/21  cmp-wnl  No data to display     Assessment:      57 year old F with PMH of HTN, hypothyroidism, SLE, RA here for follow up of SLE. She was diagnosed with SLE in 1996  when she presented with +KENNY,+dsdna,  arthritis , rash, raynauds,pleurisy (pericarditis),  and thrombocytopenia. She was recently hospitalized for severe raynauds leading to gangrene in right foot and is s/p partial amputation 3/30/2021.  During that hospitalization, she was noted to have  abnormal CT chest concerning for ILD.    Given her history of CVA, first trimester pregnancy loss, and progressive gangrene, patient was admitted for anticoagulation for suspected APS but APS work up was negative.  She was admitted from 2/5 to 2/11/21 for  epoprostenol for 5 days and pulse dose steroids.    SLE:  +KENNY,+dsdna,  arthritis , rash, raynauds,pleurisy (pericarditis), thrombocytopenia, severe raynauds requiring amputation.  #raynauds: doing much better.  Continue nifedipine 60mg poq day (takes 30mg tablets)  Continue sildenafil 20mg po TID  Continue cellcept 1500mg po BID  Continue plaquenil twice a day  ( eye exam done around September 2022- per patient clear)  Continue aspirin 81 mg po qday  Consider benylsta injections if needed.  LABS today      #ILD: noted on CT chest. Denies symptoms  -cellcept as above  -following in ILD clinic    30 * minutes of total time spent on the encounter, which includes face to face time and non-face to face time preparing to see the patient (eg, review of tests), Obtaining and/or reviewing separately obtained history, Documenting clinical information in the electronic or other health record, Independently interpreting results (not separately reported) and communicating results to the patient/family/caregiver, or Care coordination (not separately reported).

## 2022-12-13 ENCOUNTER — PATIENT MESSAGE (OUTPATIENT)
Dept: RHEUMATOLOGY | Facility: CLINIC | Age: 57
End: 2022-12-13
Payer: COMMERCIAL

## 2022-12-28 ENCOUNTER — SPECIALTY PHARMACY (OUTPATIENT)
Dept: PHARMACY | Facility: CLINIC | Age: 57
End: 2022-12-28
Payer: COMMERCIAL

## 2022-12-28 NOTE — TELEPHONE ENCOUNTER
Specialty Pharmacy - Refill Coordination    Specialty Medication Orders Linked to Encounter      Flowsheet Row Most Recent Value   Medication #1 sildenafil (REVATIO) 20 mg Tab (Order#[id number], Rx#[id number])            Refill Questions - Documented Responses      Flowsheet Row Most Recent Value   Patient Availability and HIPAA Verification    Does patient want to proceed with activity? Yes   HIPAA/medical authority confirmed? Yes   Relationship to patient of person spoken to? Self   Refill Screening Questions    Changes to allergies? No   Changes to medications? No   New conditions since last clinic visit? No   Unplanned office visit, urgent care, ED, or hospital admission in the last 4 weeks? No   How does patient/caregiver feel medication is working? Excellent   Financial problems or insurance changes? No   How many doses of your specialty medications were missed in the last 4 weeks? 0   Would patient like to speak to a pharmacist? No   When does the patient need to receive the medication? 01/02/23   Refill Delivery Questions    How will the patient receive the medication? [facility]   When does the patient need to receive the medication? 01/02/23   Shipping Address Home   Address in [facility] confirmed and updated if neccessary? Yes   Expected Copay ($) 16.67   Is the patient able to afford the medication copay? Yes   Payment Method CC on file   Days supply of Refill 30   Supplies needed? No supplies needed   Refill activity completed? Yes   Refill activity plan Refill scheduled   Shipment/Pickup Date: 12/29/22            Current Outpatient Medications   Medication Sig    aspirin (ECOTRIN) 81 MG EC tablet Take 81 mg by mouth once daily.    azelastine (ASTELIN) 137 mcg (0.1 %) nasal spray 1 spray (137 mcg total) by Nasal route 2 (two) times daily.    diclofenac sodium (VOLTAREN) 1 % Gel apply TWO grams topically FOUR times a DAY    estradioL (ESTRACE) 1 MG tablet TAKE ONE TABLET BY MOUTH EVERY DAY     hydrOXYchloroQUINE (PLAQUENIL) 200 mg tablet Take 1 tablet (200 mg total) by mouth 2 (two) times daily.    levothyroxine (SYNTHROID) 50 MCG tablet Take 1 tablet (50 mcg total) by mouth every morning.    mycophenolate (CELLCEPT) 500 mg Tab Take 3 tablets (1,500 mg total) by mouth 2 (two) times daily.    NIFEdipine (ADALAT CC) 30 MG TbSR Take 2 tablets (60 mg total) by mouth once daily.    pregabalin (LYRICA ORAL) Take 150 mg by mouth 2 (two) times a day.    progesterone (PROMETRIUM) 200 MG capsule TAKE ONE CAPSULE BY MOUTH EVERY DAY    sildenafil (REVATIO) 20 mg Tab Take 1 tablet (20 mg total) by mouth 3 (three) times daily.   Last reviewed on 12/9/2022  8:52 AM by Pramod Llanes MA    Review of patient's allergies indicates:  No Known Allergies Last reviewed on  12/9/2022 8:51 AM by Pramod Llanes      Tasks added this encounter   1/25/2023 - Refill Call (Auto Added)   Tasks due within next 3 months   No tasks due.     Lars Hester, PharmD  [facility]  [address] [place] [id number]  Phone: [phone]  Fax: [phone]

## 2023-01-17 ENCOUNTER — SPECIALTY PHARMACY (OUTPATIENT)
Dept: PHARMACY | Facility: CLINIC | Age: 58
End: 2023-01-17
Payer: COMMERCIAL

## 2023-01-17 NOTE — TELEPHONE ENCOUNTER
Incoming call from [facility] in [place], [place] requesting transfer of Sildenafil prescription. Transferred to Cecilia DAVIDSON Routing to assigned [facility] to close out patient.

## 2023-04-24 DIAGNOSIS — J84.9 ILD (INTERSTITIAL LUNG DISEASE): Primary | ICD-10-CM

## 2023-04-25 ENCOUNTER — HOSPITAL ENCOUNTER (OUTPATIENT)
Dept: RADIOLOGY | Facility: HOSPITAL | Age: 58
Discharge: HOME | End: 2023-04-25
Attending: INTERNAL MEDICINE
Payer: COMMERCIAL

## 2023-04-25 ENCOUNTER — OFFICE VISIT (OUTPATIENT)
Dept: RHEUMATOLOGY | Facility: CLINIC | Age: 58
End: 2023-04-25
Payer: MEDICARE

## 2023-04-25 VITALS
WEIGHT: 166 LBS | DIASTOLIC BLOOD PRESSURE: 82 MMHG | BODY MASS INDEX: 26.68 KG/M2 | HEIGHT: 66 IN | SYSTOLIC BLOOD PRESSURE: 142 MMHG | HEART RATE: 88 BPM

## 2023-04-25 DIAGNOSIS — R07.9 CHEST PAIN, UNSPECIFIED TYPE: ICD-10-CM

## 2023-04-25 DIAGNOSIS — M25.50 POLYARTHRALGIA: ICD-10-CM

## 2023-04-25 DIAGNOSIS — Z79.899 LONG-TERM USE OF PLAQUENIL: Primary | ICD-10-CM

## 2023-04-25 DIAGNOSIS — M32.9 SYSTEMIC LUPUS ERYTHEMATOSUS, UNSPECIFIED SLE TYPE, UNSPECIFIED ORGAN INVOLVEMENT STATUS: Primary | ICD-10-CM

## 2023-04-25 PROCEDURE — 73070 XR ELBOW 2 VIEW BILATERAL: ICD-10-PCS | Mod: 26,NTX,, | Performed by: RADIOLOGY

## 2023-04-25 PROCEDURE — 73521 XR HIPS BILATERAL 2 VIEW INCL AP PELVIS: ICD-10-PCS | Mod: 26,NTX,, | Performed by: RADIOLOGY

## 2023-04-25 PROCEDURE — 71046 X-RAY EXAM CHEST 2 VIEWS: CPT | Mod: 26,NTX,, | Performed by: RADIOLOGY

## 2023-04-25 PROCEDURE — 3008F BODY MASS INDEX DOCD: CPT | Mod: CPTII,NTX,S$GLB, | Performed by: INTERNAL MEDICINE

## 2023-04-25 PROCEDURE — 99215 PR OFFICE/OUTPT VISIT, EST, LEVL V, 40-54 MIN: ICD-10-PCS | Mod: NTX,S$GLB,, | Performed by: INTERNAL MEDICINE

## 2023-04-25 PROCEDURE — 71046 XR CHEST PA AND LATERAL: ICD-10-PCS | Mod: 26,NTX,, | Performed by: RADIOLOGY

## 2023-04-25 PROCEDURE — 73562 X-RAY EXAM OF KNEE 3: CPT | Mod: TC,50,NTX

## 2023-04-25 PROCEDURE — 3079F DIAST BP 80-89 MM HG: CPT | Mod: CPTII,NTX,S$GLB, | Performed by: INTERNAL MEDICINE

## 2023-04-25 PROCEDURE — 73562 X-RAY EXAM OF KNEE 3: CPT | Mod: 26,TXP,, | Performed by: RADIOLOGY

## 2023-04-25 PROCEDURE — 73070 X-RAY EXAM OF ELBOW: CPT | Mod: TC,50,TXP

## 2023-04-25 PROCEDURE — 73070 X-RAY EXAM OF ELBOW: CPT | Mod: 26,NTX,, | Performed by: RADIOLOGY

## 2023-04-25 PROCEDURE — 73521 X-RAY EXAM HIPS BI 2 VIEWS: CPT | Mod: TC,NTX

## 2023-04-25 PROCEDURE — 3077F PR MOST RECENT SYSTOLIC BLOOD PRESSURE >= 140 MM HG: ICD-10-PCS | Mod: CPTII,NTX,S$GLB, | Performed by: INTERNAL MEDICINE

## 2023-04-25 PROCEDURE — 99999 PR PBB SHADOW E&M-EST. PATIENT-LVL III: ICD-10-PCS | Mod: PBBFAC,TXP,, | Performed by: INTERNAL MEDICINE

## 2023-04-25 PROCEDURE — 1159F PR MEDICATION LIST DOCUMENTED IN MEDICAL RECORD: ICD-10-PCS | Mod: CPTII,NTX,S$GLB, | Performed by: INTERNAL MEDICINE

## 2023-04-25 PROCEDURE — 71046 X-RAY EXAM CHEST 2 VIEWS: CPT | Mod: TC,NTX

## 2023-04-25 PROCEDURE — 3079F PR MOST RECENT DIASTOLIC BLOOD PRESSURE 80-89 MM HG: ICD-10-PCS | Mod: CPTII,NTX,S$GLB, | Performed by: INTERNAL MEDICINE

## 2023-04-25 PROCEDURE — 73521 X-RAY EXAM HIPS BI 2 VIEWS: CPT | Mod: 26,NTX,, | Performed by: RADIOLOGY

## 2023-04-25 PROCEDURE — 73562 XR KNEE 3 VIEW BILATERAL: ICD-10-PCS | Mod: 26,TXP,, | Performed by: RADIOLOGY

## 2023-04-25 PROCEDURE — 3077F SYST BP >= 140 MM HG: CPT | Mod: CPTII,NTX,S$GLB, | Performed by: INTERNAL MEDICINE

## 2023-04-25 PROCEDURE — 99999 PR PBB SHADOW E&M-EST. PATIENT-LVL III: CPT | Mod: PBBFAC,TXP,, | Performed by: INTERNAL MEDICINE

## 2023-04-25 PROCEDURE — 99215 OFFICE O/P EST HI 40 MIN: CPT | Mod: NTX,S$GLB,, | Performed by: INTERNAL MEDICINE

## 2023-04-25 PROCEDURE — 3008F PR BODY MASS INDEX (BMI) DOCUMENTED: ICD-10-PCS | Mod: CPTII,NTX,S$GLB, | Performed by: INTERNAL MEDICINE

## 2023-04-25 PROCEDURE — 1159F MED LIST DOCD IN RCRD: CPT | Mod: CPTII,NTX,S$GLB, | Performed by: INTERNAL MEDICINE

## 2023-04-25 NOTE — PROGRESS NOTES
Rapid3 Question Responses and Scores 4/25/2023   MDHAQ Score 0.8   Psychologic Score 3.3   Pain Score 7.5   When you awakened in the morning OVER THE LAST WEEK, did you feel stiff? Yes   If Yes, please indicate the number of hours until you are as limber as you will be for the day 1   Fatigue Score 3   Global Health Score 4.5   RAPID3 Score 4.89     Answers submitted by the patient for this visit:  Rheumatology Questionnaire (Submitted on 4/25/2023)  fever: No  eye redness: No  mouth sores: No  headaches: No  shortness of breath: No  chest pain: Yes  trouble swallowing: No  diarrhea: No  constipation: No  unexpected weight change: No  genital sore: No  dysuria: No  During the last 3 days, have you had a skin rash?: No  Bruises or bleeds easily: No  cough: No

## 2023-04-25 NOTE — PROGRESS NOTES
Subjective:       Patient ID: Christen Bowman is a 55 y.o. female.    Chief Complaint: No chief complaint on file.    HPI 55 year old F with PMH of HTN, hypothyroidism, SLE, RA here to establish care. She was diagnosed with SLE in 1996  when she presented with +KENNY, arthritis , rash, raynauds,pleurisy (pericarditis),  and thrombocytopenia.  She reports she came in to hospital due to chest pain and pain/swelling in hands and elbows.  She was told to have pericardial effusion.   She was hospitalized for thrombocytopenia around age 22 and was treated with high dose steroids and Iv IG/plasmapheresis. She required splenectomy in   Due to thrombocytopenia.  She had one first trimester pregnancy loss. She was initially put on plaquenil when she was diagnosed but it was stopped at some point.        About a year ago (1/2020), she developed aphasia/confusion and had CVA. She was treated with high doses of steroids and Iv IG but no anticoagulation.  Since the stroke, she is more forgetful.        She as recently hospitalized in outside hospital for arthritis, raynauds causing ulcerations with digital ischemia and bacteremia.   It appears on December 12,2020, she developed URI symptoms (covid test-negative). She was having sinus drainage. She went to urgent care.  She went home and the next day,she noticed raynauds in hands and feet.  Within a matter of hours, she developed the blisters in lower extremities.   She was noted to have platelets of 5000 and creat 1.6 (baseline 0.64). White count had increased to 30 thousand. During admission, she developed severe ulcerations in her lower legs and toes and gangrene developed in toes. She was treated with high doses of steroids. She was also treated with antibiotics for gram positive cocci in blood cultures. Apparently, she also had bone marrow biopsy which did not show any significant changes and  BALDO did not show endocarditis.  During hospitalization, patient developed worsening  ulcerations and gangrene of multiple toes. She had lower extremity US which did not show DVT and was not put on anticoagulation. She was discharged on prednisone 80mg daily, mtx 10mg po week, diltiazem 360mg ER, nystatin, tylenol-3, estradiol and HCTZ 25mg poqday.        She is on clindamycin 300mg po q8 hours , prednisone 10mg a day, MTX 4 pills once a week, folic acid, oxycodone 10/325mg po qhs. She saw Dr. Yash Santana about 2 weeks ago. At that time, she was put on clindamycin. She saw orthopedic who is considering amputation.  Denies fevers, pleurisy, sob, alopecia. Reports raynauds in hands and feet.    Interval history: She has not been in the hospital. She is off prednisone.  She is taking cellcept 1500mg po BID.She is on sildenafil TID. She is taking nifedipene  60mg po qhs..  She is taking plaquenil 200mg po BID. Denies any rashes, oral ulcers, hair loss, joint pain or swelling.    Past Medical History:   Diagnosis Date    Raynaud's disease     Rheumatoid arteritis     Systemic lupus erythematosus    ,  Review of Systems   Constitutional: Negative for activity change, appetite change, chills, diaphoresis, fatigue and fever.   HENT: Negative for congestion, ear discharge, ear pain, facial swelling, mouth sores, sinus pressure, sneezing, sore throat, tinnitus and trouble swallowing.    Eyes: Negative for photophobia, pain, discharge, redness, itching and visual disturbance.   Respiratory: Negative for apnea, chest tightness, shortness of breath, wheezing and stridor.    Cardiovascular: Negative for leg swelling.   Gastrointestinal: Negative for abdominal distention, abdominal pain, anal bleeding, blood in stool, constipation, diarrhea and nausea.   Endocrine: Negative for cold intolerance and heat intolerance.   Genitourinary: Negative for difficulty urinating and dysuria.   Musculoskeletal: Negative for arthralgias, back pain, gait problem, joint swelling, myalgias, neck pain and neck stiffness.   Skin:  Negative for color change, pallor, rash and wound.   Neurological: Negative for dizziness, seizures, light-headedness and numbness.   Hematological: Negative for adenopathy. Does not bruise/bleed easily.   Psychiatric/Behavioral: Negative for sleep disturbance. The patient is not nervous/anxious.              Objective:   There were no vitals taken for this visit.   BP: 142/98  Physical Exam   Constitutional: She is oriented to person, place, and time.   HENT:   Head: Normocephalic and atraumatic.   Right Ear: External ear normal.   Left Ear: External ear normal.   Nose: Nose normal.   Mouth/Throat: Oropharynx is clear and moist. No oropharyngeal exudate.   Eyes: Conjunctivae and EOM are normal. Pupils are equal, round, and reactive to light. Right eye exhibits no discharge. Left eye exhibits no discharge. No scleral icterus.   Neck: No JVD present. No thyromegaly present.   Cardiovascular: Normal rate, regular rhythm, normal heart sounds and intact distal pulses.  Exam reveals no gallop and no friction rub.    No murmur heard.  Pulmonary/Chest: Effort normal and breath sounds normal. No respiratory distress. She has no wheezes. She has no rales. She exhibits no tenderness.   Abdominal: Soft. Bowel sounds are normal. She exhibits no distension and no mass. There is no abdominal tenderness. There is no rebound and no guarding.   Lymphadenopathy:     She has no cervical adenopathy.   Neurological: She is alert and oriented to person, place, and time. No cranial nerve deficit. Gait normal. Coordination normal.   Skin: Skin is dry. No rash noted. No erythema. No pallor.     Psychiatric: Affect and judgment normal.   Musculoskeletal: Tenderness and edema present. No deformity.      Comments: Gangrenous digits in left foot;   Dark Discoloration in second through fourth toes on right    Lower shins wrapped      labs: 3/3/2021  Wbc-12.6  Hematocrit-37  Plate-389  Labs 2/3/21  cmp-wnl  No data to display     Assessment:      57 year old F with PMH of HTN, hypothyroidism, SLE, RA here for follow up of SLE. She was diagnosed with SLE in 1996  when she presented with +KENNY,+dsdna,  arthritis , rash, raynauds,pleurisy (pericarditis),  and thrombocytopenia. She was recently hospitalized for severe raynauds leading to gangrene in right foot and is s/p partial amputation 3/30/2021.  During that hospitalization, she was noted to have  abnormal CT chest concerning for ILD.    Given her history of CVA, first trimester pregnancy loss, and progressive gangrene, patient was admitted for anticoagulation for suspected APS but APS work up was negative.  She was admitted from 2/5 to 2/11/21 for  epoprostenol for 5 days and pulse dose steroids.   She reports increasing arthralgias. Will get labs and xrays.  Consider referral to orthopedic for trochanteric bursitis of both hips.    SLE:  +KENNY,+dsdna,  arthritis , rash, raynauds,pleurisy (pericarditis), thrombocytopenia, severe raynauds requiring amputation.  #raynauds: doing much better.  Continue nifedipine 60mg poq day (takes 30mg tablets)  Continue sildenafil 20mg po TID  Continue cellcept 1500mg po BID  Continue plaquenil twice a day  ( eye exam done around March 2023- per patient clear)  Continue aspirin 81 mg po qday  Consider benylsta injections if needed.  LABS today      #ILD: noted on CT chest. Denies symptoms  -cellcept as above  -following in ILD clinic    40 * minutes of total time spent on the encounter, which includes face to face time and non-face to face time preparing to see the patient (eg, review of tests), Obtaining and/or reviewing separately obtained history, Documenting clinical information in the electronic or other health record, Independently interpreting results (not separately reported) and communicating results to the patient/family/caregiver, or Care coordination (not separately reported).

## 2023-04-26 ENCOUNTER — PATIENT MESSAGE (OUTPATIENT)
Dept: RHEUMATOLOGY | Facility: CLINIC | Age: 58
End: 2023-04-26
Payer: MEDICARE

## 2023-04-27 ENCOUNTER — PATIENT MESSAGE (OUTPATIENT)
Dept: RHEUMATOLOGY | Facility: CLINIC | Age: 58
End: 2023-04-27
Payer: MEDICARE

## 2023-05-22 ENCOUNTER — TELEPHONE (OUTPATIENT)
Dept: TRANSPLANT | Facility: CLINIC | Age: 58
End: 2023-05-22
Payer: MEDICARE

## 2023-05-23 ENCOUNTER — HOSPITAL ENCOUNTER (OUTPATIENT)
Dept: PULMONOLOGY | Facility: CLINIC | Age: 58
Discharge: HOME | End: 2023-05-23
Payer: MEDICARE

## 2023-05-23 ENCOUNTER — OFFICE VISIT (OUTPATIENT)
Dept: TRANSPLANT | Facility: CLINIC | Age: 58
End: 2023-05-23
Payer: COMMERCIAL

## 2023-05-23 VITALS
HEART RATE: 68 BPM | WEIGHT: 162.94 LBS | HEIGHT: 66 IN | BODY MASS INDEX: 26.19 KG/M2 | DIASTOLIC BLOOD PRESSURE: 83 MMHG | SYSTOLIC BLOOD PRESSURE: 139 MMHG

## 2023-05-23 DIAGNOSIS — J84.9 ILD (INTERSTITIAL LUNG DISEASE): ICD-10-CM

## 2023-05-23 DIAGNOSIS — M32.19 OTHER SYSTEMIC LUPUS ERYTHEMATOSUS WITH OTHER ORGAN INVOLVEMENT: ICD-10-CM

## 2023-05-23 DIAGNOSIS — I73.01 RAYNAUD'S DISEASE WITH GANGRENE: ICD-10-CM

## 2023-05-23 DIAGNOSIS — I10 HTN (HYPERTENSION), BENIGN: ICD-10-CM

## 2023-05-23 DIAGNOSIS — J30.9 ALLERGIC RHINITIS, UNSPECIFIED SEASONALITY, UNSPECIFIED TRIGGER: ICD-10-CM

## 2023-05-23 DIAGNOSIS — J84.9 ILD (INTERSTITIAL LUNG DISEASE): Primary | ICD-10-CM

## 2023-05-23 PROCEDURE — 3075F SYST BP GE 130 - 139MM HG: CPT | Mod: CPTII,NTX,S$GLB, | Performed by: NURSE PRACTITIONER

## 2023-05-23 PROCEDURE — 1159F PR MEDICATION LIST DOCUMENTED IN MEDICAL RECORD: ICD-10-PCS | Mod: CPTII,NTX,S$GLB, | Performed by: NURSE PRACTITIONER

## 2023-05-23 PROCEDURE — 94010 BREATHING CAPACITY TEST: CPT | Mod: NTX,S$GLB,, | Performed by: INTERNAL MEDICINE

## 2023-05-23 PROCEDURE — 1159F MED LIST DOCD IN RCRD: CPT | Mod: CPTII,NTX,S$GLB, | Performed by: NURSE PRACTITIONER

## 2023-05-23 PROCEDURE — 94726 PULM FUNCT TST PLETHYSMOGRAP: ICD-10-PCS | Mod: NTX,S$GLB,, | Performed by: INTERNAL MEDICINE

## 2023-05-23 PROCEDURE — 99214 OFFICE O/P EST MOD 30 MIN: CPT | Mod: 25,NTX,S$GLB, | Performed by: NURSE PRACTITIONER

## 2023-05-23 PROCEDURE — 99999 PR PBB SHADOW E&M-EST. PATIENT-LVL IV: ICD-10-PCS | Mod: PBBFAC,TXP,, | Performed by: NURSE PRACTITIONER

## 2023-05-23 PROCEDURE — 94729 PR C02/MEMBANE DIFFUSE CAPACITY: ICD-10-PCS | Mod: NTX,S$GLB,, | Performed by: INTERNAL MEDICINE

## 2023-05-23 PROCEDURE — 94010 BREATHING CAPACITY TEST: ICD-10-PCS | Mod: NTX,S$GLB,, | Performed by: INTERNAL MEDICINE

## 2023-05-23 PROCEDURE — 3075F PR MOST RECENT SYSTOLIC BLOOD PRESS GE 130-139MM HG: ICD-10-PCS | Mod: CPTII,NTX,S$GLB, | Performed by: NURSE PRACTITIONER

## 2023-05-23 PROCEDURE — 1160F PR REVIEW ALL MEDS BY PRESCRIBER/CLIN PHARMACIST DOCUMENTED: ICD-10-PCS | Mod: CPTII,NTX,S$GLB, | Performed by: NURSE PRACTITIONER

## 2023-05-23 PROCEDURE — 3008F BODY MASS INDEX DOCD: CPT | Mod: CPTII,NTX,S$GLB, | Performed by: NURSE PRACTITIONER

## 2023-05-23 PROCEDURE — 3079F PR MOST RECENT DIASTOLIC BLOOD PRESSURE 80-89 MM HG: ICD-10-PCS | Mod: CPTII,NTX,S$GLB, | Performed by: NURSE PRACTITIONER

## 2023-05-23 PROCEDURE — 99214 PR OFFICE/OUTPT VISIT, EST, LEVL IV, 30-39 MIN: ICD-10-PCS | Mod: 25,NTX,S$GLB, | Performed by: NURSE PRACTITIONER

## 2023-05-23 PROCEDURE — 99999 PR PBB SHADOW E&M-EST. PATIENT-LVL IV: CPT | Mod: PBBFAC,TXP,, | Performed by: NURSE PRACTITIONER

## 2023-05-23 PROCEDURE — 94729 DIFFUSING CAPACITY: CPT | Mod: NTX,S$GLB,, | Performed by: INTERNAL MEDICINE

## 2023-05-23 PROCEDURE — 1160F RVW MEDS BY RX/DR IN RCRD: CPT | Mod: CPTII,NTX,S$GLB, | Performed by: NURSE PRACTITIONER

## 2023-05-23 PROCEDURE — 3008F PR BODY MASS INDEX (BMI) DOCUMENTED: ICD-10-PCS | Mod: CPTII,NTX,S$GLB, | Performed by: NURSE PRACTITIONER

## 2023-05-23 PROCEDURE — 94726 PLETHYSMOGRAPHY LUNG VOLUMES: CPT | Mod: NTX,S$GLB,, | Performed by: INTERNAL MEDICINE

## 2023-05-23 PROCEDURE — 3079F DIAST BP 80-89 MM HG: CPT | Mod: CPTII,NTX,S$GLB, | Performed by: NURSE PRACTITIONER

## 2023-05-23 NOTE — PROGRESS NOTES
ADVANCED LUNG DISEASE CLINIC INITIAL EVALUATION                                                                                                                                           Reason for Visit:  SLE with lung involvement, ILD    Referring Physician: Laurel Johnson MD    History of Present Illness: Christen Bowman is a 58 y.o. female who is on 0L of oxygen.  She is on no assisted ventilation.  Her New York Heart Association Class is I and a Karnofsky score of 90% - Able to carry on normal activity: minor symptoms of disease. She is not diabetic.     Followed for SLE-ILD.  Currently on MMF and plaquenil.  Takes verapamil and sildenafil for Raynauds.  Denies dyspnea or cough.  No fevers, chills, cough, BLE edema, and no hx of PH.  Remains very active and takes all medications as directed.  Overall is doing well.  No issues on exam.          Past Medical History:   Diagnosis Date    Essential hypertension 12/23/2019 2:02:07 PM    [facility] Historical - Cardiovascular: Hypertension-No Additional Notes    Essential hypertension 12/23/2019 2:02:07 PM    [facility] Historical - Cardiovascular: Hypertension-No Additional Notes    Raynaud's disease     Rheumatoid arteritis     Systemic lupus erythematosus        Past Surgical History:   Procedure Laterality Date    SPLENECTOMY      TONSILLECTOMY      TUBAL LIGATION         Allergies: Patient has no known allergies.    Current Outpatient Medications   Medication Sig    aspirin (ECOTRIN) 81 MG EC tablet Take 81 mg by mouth once daily.    azelastine (ASTELIN) 137 mcg (0.1 %) nasal spray 1 spray (137 mcg total) by Nasal route 2 (two) times daily.    diclofenac sodium (VOLTAREN) 1 % Gel apply TWO grams topically FOUR times a DAY    estradioL (ESTRACE) 1 MG tablet TAKE ONE TABLET BY MOUTH EVERY DAY    hydrOXYchloroQUINE (PLAQUENIL) 200 mg tablet Take 1 tablet (200 mg total) by mouth 2 (two) times daily.    levothyroxine (SYNTHROID) 50 MCG tablet Take 1 tablet  (50 mcg total) by mouth every morning.    mycophenolate (CELLCEPT) 500 mg Tab TAKE THREE TABLETS BY MOUTH TWICE DAILY    NIFEdipine (ADALAT CC) 30 MG TbSR Take 2 tablets (60 mg total) by mouth once daily.    pregabalin (LYRICA ORAL) Take 150 mg by mouth 2 (two) times a day.    progesterone (PROMETRIUM) 200 MG capsule TAKE ONE CAPSULE BY MOUTH EVERY DAY    sildenafil (REVATIO) 20 mg Tab Take 1 tablet (20 mg total) by mouth 3 (three) times daily.     No current facility-administered medications for this visit.       Immunization History   Administered Date(s) Administered    COVID-19, MRNA, LN-S, PF (Pfizer) (Gray Cap) 03/21/2022    COVID-19, MRNA, LN-S, PF (Pfizer) (Purple Cap) 08/19/2021, 09/09/2021    Influenza - Quadrivalent - PF *Preferred* (6 months and older) 09/22/2013, 10/17/2014, 02/11/2021    Pneumococcal Polysaccharide - 23 Valent 10/17/2014     Family History:    Family History   Problem Relation Age of Onset    Hypertension Mother     Lupus Mother     Cancer Mother     Hypertension Father     Anuerysm Father      Social History     Substance and Sexual Activity   Alcohol Use Not Currently      Social History     Substance and Sexual Activity   Drug Use Never      Social History     Socioeconomic History    Marital status:    Tobacco Use    Smoking status: Never     Passive exposure: Never    Smokeless tobacco: Never   Substance and Sexual Activity    Alcohol use: Not Currently    Drug use: Never     Review of Systems   Constitutional: Negative.  Negative for chills, diaphoresis, fever, malaise/fatigue and weight loss.   HENT:  Negative for congestion, ear pain, nosebleeds and sinus pain.    Eyes:  Negative for blurred vision, photophobia and pain.   Respiratory:  Negative for cough, hemoptysis, sputum production, shortness of breath and wheezing.    Cardiovascular:  Negative for chest pain, palpitations, claudication and leg swelling.   Gastrointestinal:  Negative for constipation, diarrhea,  "heartburn, nausea and vomiting.   Genitourinary:  Negative for frequency and urgency.   Musculoskeletal:  Negative for falls, joint pain and myalgias.   Skin:  Negative for rash.   Neurological:  Positive for tremors. Negative for dizziness, focal weakness, weakness and headaches.   Endo/Heme/Allergies:  Negative for environmental allergies and polydipsia. Does not bruise/bleed easily.   Psychiatric/Behavioral:  Negative for depression. The patient is not nervous/anxious.    Vitals  /83 (BP Location: Left arm, Patient Position: Sitting, BP Method: Medium (Automatic))   Pulse 68   Ht 5' 6" (1.676 m)   Wt 73.9 kg (162 lb 14.7 oz)   BMI 26.30 kg/m²   Physical Exam  Vitals and nursing note reviewed.   Constitutional:       General: She is not in acute distress.     Appearance: Normal appearance. She is well-developed. She is not diaphoretic.   HENT:      Head: Normocephalic and atraumatic.      Mouth/Throat:      Pharynx: No oropharyngeal exudate.   Eyes:      General: No scleral icterus.     Extraocular Movements: Extraocular movements intact.      Conjunctiva/sclera: Conjunctivae normal.   Neck:      Thyroid: No thyromegaly.      Vascular: No JVD.      Trachea: No tracheal deviation.   Cardiovascular:      Rate and Rhythm: Normal rate and regular rhythm.      Heart sounds: Normal heart sounds. No murmur heard.    No friction rub. No gallop.   Pulmonary:      Effort: Pulmonary effort is normal. No respiratory distress.      Breath sounds: Normal breath sounds. No stridor. No wheezing, rhonchi or rales.   Chest:      Chest wall: No tenderness.   Abdominal:      General: Bowel sounds are normal. There is no distension.      Palpations: Abdomen is soft.      Tenderness: There is no abdominal tenderness.   Musculoskeletal:         General: No deformity. Normal range of motion.      Cervical back: Normal range of motion and neck supple.   Skin:     General: Skin is warm and dry.      Capillary Refill: Capillary " refill takes less than 2 seconds.      Coloration: Skin is not pale.      Findings: No erythema or rash.   Neurological:      Mental Status: She is alert and oriented to person, place, and time.      Cranial Nerves: No cranial nerve deficit.   Psychiatric:         Mood and Affect: Mood normal.         Judgment: Judgment normal.       Labs:      Pulmonary Function Tests 5/23/2023 11/10/2022 5/5/2022   FVC 2.55 2.57 2.63   FEV1 1.85 1.86 1.93   TLC (liters) 3.77 3.87 4.12   DLCO (ml/mmHg sec) 15.74 15.52 15.5   FVC% 84.5 85 86.5   FEV1% 77 77 79.8   FEF 25-75 1.28 1.32 1.26   FEF 25-75% 56.9 58 54.6   TLC% 71.5 74 78.2   RV 1.23 1.3 1.49   RV% 63.5 68 77.9   DLCO% 64 62 15.5     6MW 11/10/2022 5/5/2022   6MWT Status completed without stopping completed without stopping   Patient Reported Lightheadedness;Leg pain Leg pain   Was O2 used? No No   6MW Distance walked (feet) 1732 1600   Distance walked (meters) 527.91 487.68   Did patient stop? No No   Oxygen Saturation 100 98   Supplemental Oxygen Room Air Room Air   Heart Rate 86 79   Blood Pressure 154/87 141/85   Dann Dyspnea Rating  nothing at all nothing at all   Oxygen Saturation 97 98   Supplemental Oxygen Room Air Room Air   Heart Rate 130 108   Blood Pressure 183/93 192/96   Dann Dyspnea Rating  moderate moderate   Recovery Time (seconds) 192 197   Oxygen Saturation 100 99   Supplemental Oxygen Room Air Room Air   Heart Rate 94 83       Imaging:  Results for orders placed during the hospital encounter of 04/25/23    X-Ray Chest PA And Lateral    Narrative  EXAMINATION:  XR CHEST PA AND LATERAL    CLINICAL HISTORY:  r/o rheumatoid nodules; Chest pain, unspecified    TECHNIQUE:  PA and lateral views of the chest were performed.    COMPARISON:  02/06/2021    FINDINGS:  Cardiac size is normal.  Lungs are clear no infiltrate or pulmonary nodules can be seen few fibrotic streaks are noted but no pleural effusion is noted.    Impression  See above      Electronically  signed by: Stanley Monge MD  Date:    04/25/2023  Time:    10:43    Time:    13:05    Results for orders placed during the hospital encounter of 02/05/21    CT Chest Without Contrast    Narrative  EXAMINATION:  CT CHEST WITHOUT CONTRAST    CLINICAL HISTORY:  HRCT please. CT with Mosaic attenuation, left sided reticular opacification with fibrotic appearance and thin-walled cystic changes.  Additionally there are subcentimeter nodular opacities;    TECHNIQUE:  Low dose axial images, sagittal and coronal reformations were obtained from the thoracic inlet to the lung bases. Contrast was not administered.    X-ray dose: DLP= 161.76 mGy-cm    COMPARISON:  CTA abdomen pelvis 02/08/2021    Chest radiograph 02/06/2021    FINDINGS:  Base of Neck:  No significant abnormality.    Thoracic soft tissues:  No significant abnormality.    Aorta: Left-sided aortic arch with normal caliber, contour, and course with no significant atherosclerotic calcifications.  Common origin of the brachiocephalic and left common carotid arteries.    Heart: Normal size.  No significant coronary artery calcific atherosclerosis.    Pericardium: No pericardial fluid or pericardial calcification.    Pulmonary vasculature: Pulmonary arteries distribute normally. There are four pulmonary veins that return to the left atrium.    Xenia/Mediastinum:  No lymph node enlargement on this noncontrast CT. Hilar contours are unremarkable.    Airways:  Trachea is midline and proximal airways are patent without significant abnormality.    Pleura: No pleural thickening, pleural calcification, or pleural fluid.  No pneumothorax.    Lungs:    -Scattered bilateral pulmonary nodules, with the largest on the right a pleural based nodule in the apical segment of the right upper lobe measuring 0.6 cm (axial series 4, image 117) and the largest on the left in the anteromedial basal segment of the left lower lobe measuring 0.3 cm (axial series 4, image 295).    -Subpleural  reticular opacities and traction bronchiectasis, most prominent in the basilar segments of the left lower lobe and to a lesser extent the basilar segments of the right lower lobe.  Honeycombing is present in the left lower lobe.    -Subcentimeter densely calcified granuloma in the posterior basal segment of the left lower lobe reflects remote infection.    Esophagus: Normal in course and caliber.    Upper abdomen: No acute intra-abdominal abnormality.  Spleen is surgically absent.    Bones:  Degenerative changes of the visualized osseous structures without acute fracture or lytic or sclerotic lesions.    Impression  1. There are subpleural reticular opacities and traction bronchiectasis, most prominent in the basilar segments of the left lower lobe and to a lesser extent in the basilar segments of the right lower lobe.  Honeycombing is present in the left lower lobe.  Findings suggest interstitial lung disease, likely UIP versus NSIP.  2. Scattered bilateral pulmonary nodules measuring up to 0.6 cm.  For multiple solid nodules with any 6 mm or greater, Fleischner Society guidelines recommend follow up with non-contrast chest CT at 3-6 months (doing 05/10/2021 and 08/10/2021) and 18-24 months (between 08/10/2022 and 02/10/2023) after discovery.  3. Subcentimeter densely calcified granuloma in the posterior basal segment of the left lower lobe reflects remote infection.    Electronically signed by resident: Celso Butler  Date:    02/10/2021  Time:    11:20    Electronically signed by: Yoselyn Vasquez MD  Date:    02/10/2021  Time:    11:41      Cardiodiagnostics:  Results for orders placed during the hospital encounter of 02/05/21    Echo Color Flow Doppler? Yes    Interpretation Summary  · Tachycardia was present during the study  · The left ventricle is normal in size with concentric remodeling and hyperdynamic systolic function. The estimated ejection fraction is 75%  · Normal left ventricular diastolic function.  ·  Normal right ventricular size with normal right ventricular systolic function.  · The estimated PA systolic pressure is 21 mmHg.  · Normal central venous pressure (3 mmHg).          Assessment:  1. ILD (interstitial lung disease)    2. Other systemic lupus erythematosus with other organ involvement    3. Raynaud's disease with gangrene    4. HTN (hypertension), benign    5. Allergic rhinitis, unspecified seasonality, unspecified trigger      Plan:   Followed for SLE-ILD.  CT imaging consistent with NSIP and less likely UIP.  FVC and DLCO stable. TTE does not show evidence of PH (sildenafil for Raynaud's and not PH).  Overall stable from a respiratory standpoint.  No progression to warrant anti-fibrotic therapy.    Followed with Dr. Jhonson for SLE.  Currently on MMF and plaquenil.  Continue with current therapy.    On Sildenafil and Verapamil for Raynaud's.  Continue with current therapy.    Continue current therapy and follow ups with PCP.    Continue with nasal steroid and antihistamine for allergic rhinitis.    Follow-up in 6 months with repeat PFTs.    Luis Higgins NP  Lung Transplant/Advanced Lung Disease

## 2023-05-23 NOTE — LETTER
May 23, 2023        Laurel Johnson  [address]  [address]  [place] [address]  Phone: [phone]  Fax: [phone]             [facility] 1st Fl  [address]  [facility] [address]  Phone: [phone]   Patient: Christen Bowman   MR Number: [id number]   YOB: 1965   Date of Visit: 5/23/2023       Dear Dr. Laurel Johnson    Thank you for referring Christen Bowman to me for evaluation. Attached you will find relevant portions of my assessment and plan of care.    If you have questions, please do not hesitate to call me. I look forward to following Christen Bowman along with you.    Sincerely,    Luis Higgins NP    Enclosure    If you would like to receive this communication electronically, please contact [email or web address] or [phone] to request [employer] access.    [employer] is a tool which provides read-only access to select patient information with whom you have a relationship. Its easy to use and provides real time access to review your patients record including encounter summaries, notes, results, and demographic information.    If you feel you have received this communication in error or would no longer like to receive these types of communications, please e-mail [email or web address]

## 2023-05-26 LAB
DLCO ADJ PRE: 15.42 ML/(MIN*MMHG) (ref 18.84–30.3)
DLCO SINGLE BREATH LLN: 18.84
DLCO SINGLE BREATH PRE REF: 64 %
DLCO SINGLE BREATH REF: 24.57
DLCOC SBVA LLN: 3.26
DLCOC SBVA PRE REF: 98 %
DLCOC SBVA REF: 4.66
DLCOC SINGLE BREATH LLN: 18.84
DLCOC SINGLE BREATH PRE REF: 62.7 %
DLCOC SINGLE BREATH REF: 24.57
DLCOCSBVAULN: 6.05
DLCOCSINGLEBREATHULN: 30.3
DLCOSINGLEBREATHULN: 30.3
DLCOVA LLN: 3.26
DLCOVA PRE REF: 100 %
DLCOVA PRE: 4.66 ML/(MIN*MMHG*L) (ref 3.26–6.05)
DLCOVA REF: 4.66
DLCOVAULN: 6.05
DLVAADJ PRE: 4.56 ML/(MIN*MMHG*L) (ref 3.26–6.05)
ERV LLN: -16449.12
ERV PRE REF: 99.4 %
ERV REF: 0.88
ERVULN: ABNORMAL
FEF 25 75 LLN: 1.02
FEF 25 75 PRE REF: 56.9 %
FEF 25 75 REF: 2.26
FEV05 LLN: 1.14
FEV05 REF: 2
FEV1 FVC LLN: 69
FEV1 FVC PRE REF: 90.7 %
FEV1 FVC REF: 80
FEV1 LLN: 1.78
FEV1 PRE REF: 77 %
FEV1 REF: 2.4
FRCPLETH LLN: 1.99
FRCPLETH PREREF: 74.8 %
FRCPLETH REF: 2.81
FRCPLETHULN: 3.63
FVC LLN: 2.26
FVC PRE REF: 84.5 %
FVC REF: 3.01
IVC PRE: 2.4 L (ref 2.26–3.81)
IVC SINGLE BREATH LLN: 2.26
IVC SINGLE BREATH PRE REF: 79.7 %
IVC SINGLE BREATH REF: 3.01
IVCSINGLEBREATHULN: 3.81
LLN IC: -16447.69
PEF LLN: 4.06
PEF PRE REF: 83.7 %
PEF REF: 6.19
PHYSICIAN COMMENT: ABNORMAL
PRE DLCO: 15.74 ML/(MIN*MMHG) (ref 18.84–30.3)
PRE ERV: 0.88 L (ref -16449.12–16450.88)
PRE FEF 25 75: 1.28 L/S (ref 1.02–3.97)
PRE FET 100: 7.4 SEC
PRE FEV05 REF: 72.8 %
PRE FEV1 FVC: 72.49 % (ref 68.71–89.56)
PRE FEV1: 1.85 L (ref 1.78–3)
PRE FEV5: 1.45 L (ref 1.14–2.85)
PRE FRC PL: 2.1 L (ref 1.99–3.63)
PRE FVC: 2.55 L (ref 2.26–3.81)
PRE IC: 1.67 L (ref -16447.69–16452.31)
PRE PEF: 5.18 L/S (ref 4.06–8.32)
PRE REF IC: 72.5 %
PRE RV: 1.23 L (ref 1.35–2.51)
PRE TLC: 3.77 L (ref 4.29–6.26)
RAW PRE REF: 93.9 %
RAW PRE: 2.87 CMH2O*S/L (ref 3.06–3.06)
RAW REF: 3.06
REF IC: 2.31
RV LLN: 1.35
RV PRE REF: 63.5 %
RV REF: 1.93
RVTLC LLN: 28
RVTLC PRE REF: 85.5 %
RVTLC PRE: 32.51 % (ref 28.41–47.59)
RVTLC REF: 38
RVTLCULN: 48
RVULN: 2.51
SGAW PRE REF: 129 %
SGAW PRE: 0.13 1/(CMH2O*S) (ref 0.1–0.1)
SGAW REF: 0.1
TLC LLN: 4.29
TLC PRE REF: 71.5 %
TLC REF: 5.27
TLC ULN: 6.26
ULN IC: ABNORMAL
VA PRE: 3.38 L (ref 5.12–5.12)
VA SINGLE BREATH LLN: 5.12
VA SINGLE BREATH PRE REF: 65.9 %
VA SINGLE BREATH REF: 5.12
VASINGLEBREATHULN: 5.12
VC LLN: 2.26
VC PRE REF: 84.5 %
VC PRE: 2.55 L (ref 2.26–3.81)
VC REF: 3.01
VC ULN: 3.81

## 2023-07-05 ENCOUNTER — PATIENT MESSAGE (OUTPATIENT)
Dept: RHEUMATOLOGY | Facility: CLINIC | Age: 58
End: 2023-07-05
Payer: MEDICARE

## 2023-07-06 DIAGNOSIS — R42 DIZZINESS: Primary | ICD-10-CM

## 2023-07-07 ENCOUNTER — PATIENT MESSAGE (OUTPATIENT)
Dept: RHEUMATOLOGY | Facility: CLINIC | Age: 58
End: 2023-07-07
Payer: MEDICARE

## 2023-07-07 ENCOUNTER — PATIENT MESSAGE (OUTPATIENT)
Dept: INFECTIOUS DISEASES | Facility: CLINIC | Age: 58
End: 2023-07-07
Payer: MEDICARE

## 2023-07-23 NOTE — PROGRESS NOTES
Subjective:   Patient ID:  Christen Bowman is a 58 y.o. female who presents for evaluation of CVD/Dizziness    HPI:  Here for CVD/Dizziness more lightheaded says BP runs in the 110-120/upper 80s.The patient has no chest pain, SOB, TIA, palpitations, syncope or pre-syncope.Patient does not exercise a lot but active         Review of Systems   Constitutional: Negative for chills, decreased appetite, diaphoresis, fever, malaise/fatigue, night sweats, weight gain and weight loss.   HENT:  Negative for congestion, hoarse voice, nosebleeds, sore throat and tinnitus.    Eyes:  Negative for blurred vision, double vision, vision loss in left eye, vision loss in right eye, visual disturbance and visual halos.   Cardiovascular:  Negative for chest pain, claudication, cyanosis, dyspnea on exertion, irregular heartbeat, leg swelling, near-syncope, orthopnea, palpitations, paroxysmal nocturnal dyspnea and syncope.   Respiratory:  Negative for cough, hemoptysis, shortness of breath, sleep disturbances due to breathing, snoring, sputum production and wheezing.    Endocrine: Negative for cold intolerance, heat intolerance, polydipsia, polyphagia and polyuria.   Hematologic/Lymphatic: Negative for adenopathy and bleeding problem. Does not bruise/bleed easily.   Skin:  Negative for color change, dry skin, flushing, itching, nail changes, poor wound healing, rash, skin cancer, suspicious lesions and unusual hair distribution.   Musculoskeletal:  Negative for arthritis, back pain, falls, gout, joint pain, joint swelling, muscle cramps, muscle weakness, myalgias and stiffness.   Gastrointestinal:  Negative for abdominal pain, anorexia, change in bowel habit, constipation, diarrhea, dysphagia, heartburn, hematemesis, hematochezia, melena and vomiting.   Genitourinary:  Negative for decreased libido, dysuria, hematuria, hesitancy and urgency.   Neurological:  Positive for dizziness. Negative for excessive daytime sleepiness, focal  "weakness, headaches, light-headedness, loss of balance, numbness, paresthesias, seizures, sensory change, tremors, vertigo and weakness.   Psychiatric/Behavioral:  Negative for altered mental status, depression, hallucinations, memory loss, substance abuse and suicidal ideas. The patient does not have insomnia and is not nervous/anxious.    Allergic/Immunologic: Negative for environmental allergies and hives.     Objective: /69   Ht 5' 7" (1.702 m)   Wt 74.4 kg (164 lb 0.4 oz)   SpO2 99%   BMI 25.69 kg/m²      Physical Exam  Constitutional:       Appearance: She is well-developed.   HENT:      Head: Normocephalic.   Eyes:      Pupils: Pupils are equal, round, and reactive to light.   Neck:      Thyroid: No thyromegaly.      Vascular: Normal carotid pulses. No carotid bruit, hepatojugular reflux or JVD.   Cardiovascular:      Rate and Rhythm: Normal rate and regular rhythm.      Pulses: Intact distal pulses.      Heart sounds: Normal heart sounds. No murmur heard.    No friction rub. No gallop.   Pulmonary:      Effort: Pulmonary effort is normal. No tachypnea or respiratory distress.      Breath sounds: Normal breath sounds. No wheezing or rales.   Chest:      Chest wall: No tenderness.   Abdominal:      General: Bowel sounds are normal. There is no distension.      Palpations: Abdomen is soft. There is no mass.      Tenderness: There is no abdominal tenderness. There is no guarding or rebound.   Musculoskeletal:         General: No tenderness. Normal range of motion.      Cervical back: Normal range of motion.   Lymphadenopathy:      Cervical: No cervical adenopathy.   Skin:     General: Skin is warm.      Findings: No erythema or rash.   Neurological:      Mental Status: She is alert and oriented to person, place, and time.      Cranial Nerves: No cranial nerve deficit.      Coordination: Coordination normal.   Psychiatric:         Behavior: Behavior normal.         Thought Content: Thought content " normal.         Judgment: Judgment normal.       Assessment:     1. HTN (hypertension), benign    2. Other systemic lupus erythematosus with other organ involvement    3. Hypothyroidism, unspecified type    4. Raynaud's disease with gangrene    5. Gangrene of toe of left foot    6. Hyperglycemia    7. ILD (interstitial lung disease)    8. Dizziness    9. Encounter for screening for cardiovascular disorders    10. Tachycardia        Plan:   Discussed diet , achieving and maintaining ideal body weight, and exercise.   We reviewed meds in detail.  Reassured-Discussed goals, options, plan.  Omega-3 > 800 mg/d combined EPA/DHA.  Goal BP< 130/80.  No lipids for 3 years here   Could do CAC and Lpa  After CAC, Stress CFD or CFD  Discussed increased hydration  Christen was seen today for dizziness.    Diagnoses and all orders for this visit:    HTN (hypertension), benign  -     Lipid Panel; Future; Expected date: 07/26/2023  -     Comprehensive Metabolic Panel; Future; Expected date: 07/26/2023  -     CBC Auto Differential; Future; Expected date: 07/26/2023  -     EKG 12-lead; Future; Expected date: 07/26/2023  -     Lipoprotein A (LPA); Future; Expected date: 07/26/2023  -     CT Cardiac Scoring; Future; Expected date: 07/26/2023    Other systemic lupus erythematosus with other organ involvement  -     Lipid Panel; Future; Expected date: 07/26/2023  -     Comprehensive Metabolic Panel; Future; Expected date: 07/26/2023    Hypothyroidism, unspecified type  -     TSH; Future; Expected date: 07/26/2023  -     T4, Free; Future; Expected date: 07/26/2023    Raynaud's disease with gangrene    Gangrene of toe of left foot    Hyperglycemia    ILD (interstitial lung disease)    Dizziness  -     Ambulatory referral/consult to Cardiology  -     Comprehensive Metabolic Panel; Future; Expected date: 07/26/2023    Encounter for screening for cardiovascular disorders  -     CT Cardiac Scoring; Future; Expected date:  07/26/2023    Tachycardia  -     CBC Auto Differential; Future; Expected date: 07/26/2023  -     EKG 12-lead; Future; Expected date: 07/26/2023  -     CT Cardiac Scoring; Future; Expected date: 07/26/2023            Follow up for ECG and CAC cash pay today; fasting labs soon; after CAC Ex CFD or CFD.

## 2023-07-25 ENCOUNTER — HOSPITAL ENCOUNTER (OUTPATIENT)
Dept: CARDIOLOGY | Facility: CLINIC | Age: 58
Discharge: HOME | End: 2023-07-25
Payer: COMMERCIAL

## 2023-07-25 ENCOUNTER — OFFICE VISIT (OUTPATIENT)
Dept: CARDIOLOGY | Facility: CLINIC | Age: 58
End: 2023-07-25
Payer: MEDICARE

## 2023-07-25 VITALS
HEIGHT: 67 IN | OXYGEN SATURATION: 99 % | BODY MASS INDEX: 25.74 KG/M2 | DIASTOLIC BLOOD PRESSURE: 69 MMHG | SYSTOLIC BLOOD PRESSURE: 117 MMHG | WEIGHT: 164 LBS

## 2023-07-25 DIAGNOSIS — I73.01 RAYNAUD'S DISEASE WITH GANGRENE: ICD-10-CM

## 2023-07-25 DIAGNOSIS — I10 HTN (HYPERTENSION), BENIGN: ICD-10-CM

## 2023-07-25 DIAGNOSIS — R00.0 TACHYCARDIA: ICD-10-CM

## 2023-07-25 DIAGNOSIS — R73.9 HYPERGLYCEMIA: ICD-10-CM

## 2023-07-25 DIAGNOSIS — Z13.6 ENCOUNTER FOR SCREENING FOR CARDIOVASCULAR DISORDERS: ICD-10-CM

## 2023-07-25 DIAGNOSIS — E03.9 HYPOTHYROIDISM, UNSPECIFIED TYPE: ICD-10-CM

## 2023-07-25 DIAGNOSIS — I10 HTN (HYPERTENSION), BENIGN: Primary | ICD-10-CM

## 2023-07-25 DIAGNOSIS — I96 GANGRENE OF TOE OF LEFT FOOT: ICD-10-CM

## 2023-07-25 DIAGNOSIS — M32.19 OTHER SYSTEMIC LUPUS ERYTHEMATOSUS WITH OTHER ORGAN INVOLVEMENT: ICD-10-CM

## 2023-07-25 DIAGNOSIS — R42 DIZZINESS: ICD-10-CM

## 2023-07-25 DIAGNOSIS — J84.9 ILD (INTERSTITIAL LUNG DISEASE): ICD-10-CM

## 2023-07-25 PROCEDURE — 3074F PR MOST RECENT SYSTOLIC BLOOD PRESSURE < 130 MM HG: ICD-10-PCS | Mod: CPTII,NTX,S$GLB, | Performed by: INTERNAL MEDICINE

## 2023-07-25 PROCEDURE — 3008F PR BODY MASS INDEX (BMI) DOCUMENTED: ICD-10-PCS | Mod: CPTII,NTX,S$GLB, | Performed by: INTERNAL MEDICINE

## 2023-07-25 PROCEDURE — 3078F DIAST BP <80 MM HG: CPT | Mod: CPTII,NTX,S$GLB, | Performed by: INTERNAL MEDICINE

## 2023-07-25 PROCEDURE — 3078F PR MOST RECENT DIASTOLIC BLOOD PRESSURE < 80 MM HG: ICD-10-PCS | Mod: CPTII,NTX,S$GLB, | Performed by: INTERNAL MEDICINE

## 2023-07-25 PROCEDURE — 3008F BODY MASS INDEX DOCD: CPT | Mod: CPTII,NTX,S$GLB, | Performed by: INTERNAL MEDICINE

## 2023-07-25 PROCEDURE — 99999 PR PBB SHADOW E&M-EST. PATIENT-LVL V: ICD-10-PCS | Mod: PBBFAC,TXP,, | Performed by: INTERNAL MEDICINE

## 2023-07-25 PROCEDURE — 99204 PR OFFICE/OUTPT VISIT, NEW, LEVL IV, 45-59 MIN: ICD-10-PCS | Mod: NTX,S$GLB,, | Performed by: INTERNAL MEDICINE

## 2023-07-25 PROCEDURE — 99204 OFFICE O/P NEW MOD 45 MIN: CPT | Mod: NTX,S$GLB,, | Performed by: INTERNAL MEDICINE

## 2023-07-25 PROCEDURE — 99999 PR PBB SHADOW E&M-EST. PATIENT-LVL V: CPT | Mod: PBBFAC,TXP,, | Performed by: INTERNAL MEDICINE

## 2023-07-25 PROCEDURE — 93000 EKG 12-LEAD: ICD-10-PCS | Mod: NTX,S$GLB,, | Performed by: INTERNAL MEDICINE

## 2023-07-25 PROCEDURE — 93000 ELECTROCARDIOGRAM COMPLETE: CPT | Mod: NTX,S$GLB,, | Performed by: INTERNAL MEDICINE

## 2023-07-25 PROCEDURE — 1159F MED LIST DOCD IN RCRD: CPT | Mod: CPTII,NTX,S$GLB, | Performed by: INTERNAL MEDICINE

## 2023-07-25 PROCEDURE — 3074F SYST BP LT 130 MM HG: CPT | Mod: CPTII,NTX,S$GLB, | Performed by: INTERNAL MEDICINE

## 2023-07-25 PROCEDURE — 1159F PR MEDICATION LIST DOCUMENTED IN MEDICAL RECORD: ICD-10-PCS | Mod: CPTII,NTX,S$GLB, | Performed by: INTERNAL MEDICINE

## 2023-07-25 NOTE — PATIENT INSTRUCTIONS
Discussed diet , achieving and maintaining ideal body weight, and exercise.   We reviewed meds in detail.  Reassured-Discussed goals, options, plan.  Omega-3 > 800 mg/d combined EPA/DHA.  Goal BP< 130/80.  No lipids for 3 years here   Could do CAC and Lpa  After CAC, Stress CFD or CFD  Discussed increased hydration

## 2023-08-01 RX ORDER — MYCOPHENOLATE MOFETIL 500 MG/1
TABLET ORAL
Qty: 180 TABLET | Refills: 4 | Status: SHIPPED | OUTPATIENT
Start: 2023-08-01 | End: 2023-10-05 | Stop reason: SDUPTHER

## 2023-08-23 ENCOUNTER — HOSPITAL ENCOUNTER (OUTPATIENT)
Dept: RADIOLOGY | Facility: HOSPITAL | Age: 58
Discharge: HOME | End: 2023-08-23
Attending: INTERNAL MEDICINE
Payer: MEDICARE

## 2023-08-23 ENCOUNTER — OFFICE VISIT (OUTPATIENT)
Dept: NEUROLOGY | Facility: CLINIC | Age: 58
End: 2023-08-23
Payer: MEDICARE

## 2023-08-23 VITALS
HEART RATE: 83 BPM | WEIGHT: 162.5 LBS | DIASTOLIC BLOOD PRESSURE: 86 MMHG | HEIGHT: 68 IN | SYSTOLIC BLOOD PRESSURE: 133 MMHG | BODY MASS INDEX: 24.63 KG/M2

## 2023-08-23 DIAGNOSIS — R00.0 TACHYCARDIA: ICD-10-CM

## 2023-08-23 DIAGNOSIS — Z13.6 ENCOUNTER FOR SCREENING FOR CARDIOVASCULAR DISORDERS: ICD-10-CM

## 2023-08-23 DIAGNOSIS — I10 HTN (HYPERTENSION), BENIGN: ICD-10-CM

## 2023-08-23 DIAGNOSIS — R42 DIZZINESS: ICD-10-CM

## 2023-08-23 PROCEDURE — 75571 CT CALCIUM SCORING CARDIAC: ICD-10-PCS | Mod: 26,NTX,, | Performed by: RADIOLOGY

## 2023-08-23 PROCEDURE — 75571 CT HRT W/O DYE W/CA TEST: CPT | Mod: 26,NTX,, | Performed by: RADIOLOGY

## 2023-08-23 PROCEDURE — 75571 CT HRT W/O DYE W/CA TEST: CPT | Mod: TC,TXP

## 2023-08-23 PROCEDURE — 99213 OFFICE O/P EST LOW 20 MIN: CPT | Mod: PBBFAC,TXP | Performed by: PSYCHIATRY & NEUROLOGY

## 2023-08-23 PROCEDURE — 99999 PR PBB SHADOW E&M-EST. PATIENT-LVL III: CPT | Mod: PBBFAC,TXP,, | Performed by: PSYCHIATRY & NEUROLOGY

## 2023-08-23 PROCEDURE — 99204 OFFICE O/P NEW MOD 45 MIN: CPT | Mod: NTX,S$GLB,, | Performed by: PSYCHIATRY & NEUROLOGY

## 2023-08-23 PROCEDURE — 99999 PR PBB SHADOW E&M-EST. PATIENT-LVL III: ICD-10-PCS | Mod: PBBFAC,TXP,, | Performed by: PSYCHIATRY & NEUROLOGY

## 2023-08-23 PROCEDURE — 99204 PR OFFICE/OUTPT VISIT, NEW, LEVL IV, 45-59 MIN: ICD-10-PCS | Mod: NTX,S$GLB,, | Performed by: PSYCHIATRY & NEUROLOGY

## 2023-08-23 NOTE — PROGRESS NOTES
Your results look fine and do not require any change in treatment. Perfect 0.    Please contact me if you have any additional concerns.    Sincerely,    Nikita Mcmahan

## 2023-08-23 NOTE — PROGRESS NOTES
Christen Bowman is a 58 y.o. year old female that  presents with chief complain of dizziness.     HPI:  Mrs Bowman has SLE, RA, Raynaud's disease, HTN, stroke without residual deficits, chronic tremors both hands, and recent onset of dizziness.  She tells me that she began experiencing intermittent feeling of lightheadedness/dizziness 3 or 4 weeks ago, almost immediately after sustaining a fall in which she hit her head. In the beginning she also had mild headaches and the dizziness was occurring off and on every day.  Said that the dizziness occurs both sitting and standing, is not trigger by head movements, can last several hours, sometimes with concomitant nausea, but no associated vomiting, diaphoresis, chest pain, hearing impairment, pulsatile tinnitus, double vision, difficulty swallowing, imbalance, focal weakness, numbness, slurred speech, language or visual disturbances.  No recent change in medications.  Of note, she indicated that her dizziness is gradually improvement and currently happens only 2 times x week and is not disabling.        Past Medical History:   Diagnosis Date    Essential hypertension 12/23/2019 2:02:07 PM    [facility] Historical - Cardiovascular: Hypertension-No Additional Notes    Essential hypertension 12/23/2019 2:02:07 PM    [facility] Historical - Cardiovascular: Hypertension-No Additional Notes    Raynaud's disease     Rheumatoid arteritis     Systemic lupus erythematosus      Social History     Socioeconomic History    Marital status:    Tobacco Use    Smoking status: Never     Passive exposure: Never    Smokeless tobacco: Never   Substance and Sexual Activity    Alcohol use: Not Currently    Drug use: Never     Social Determinants of Health     Financial Resource Strain: Medium Risk (7/18/2023)    Overall Financial Resource Strain (CARDIA)     Difficulty of Paying Living Expenses: Somewhat hard   Food Insecurity: Food Insecurity Present (7/18/2023)    Hunger  Vital Sign     Worried About Running Out of Food in the Last Year: Sometimes true     Ran Out of Food in the Last Year: Never true   Transportation Needs: No Transportation Needs (7/18/2023)    PRAPARE - Transportation     Lack of Transportation (Medical): No     Lack of Transportation (Non-Medical): No   Physical Activity: Insufficiently Active (7/18/2023)    Exercise Vital Sign     Days of Exercise per Week: 2 days     Minutes of Exercise per Session: 30 min   Stress: No Stress Concern Present (7/18/2023)    [employer] - Occupational Stress Questionnaire     Feeling of Stress : Only a little   Social Connections: Unknown (7/18/2023)    Social Connection and Isolation Panel [NHANES]     Frequency of Communication with Friends and Family: More than three times a week     Frequency of Social Gatherings with Friends and Family: Once a week     Active Member of Clubs or Organizations: Yes     Attends Club or Organization Meetings: More than 4 times per year     Marital Status:    Housing Stability: Low Risk  (7/18/2023)    Housing Stability Vital Sign     Unable to Pay for Housing in the Last Year: No     Number of Places Lived in the Last Year: 1     Unstable Housing in the Last Year: No     Past Surgical History:   Procedure Laterality Date    SPLENECTOMY      TONSILLECTOMY      TUBAL LIGATION       Family History   Problem Relation Age of Onset    Hypertension Mother     Lupus Mother     Cancer Mother     Hypertension Father     Anuerysm Father            Review of Systems  General ROS: negative for chills, fever or weight loss  Psychological ROS: negative for hallucination, depression or suicidal ideation  Ophthalmic ROS: negative for blurry vision, photophobia or eye pain  ENT ROS: negative for epistaxis, sore throat or rhinorrhea  Respiratory ROS: no cough, shortness of breath, or wheezing  Cardiovascular ROS: no chest pain or dyspnea on exertion  Gastrointestinal ROS: no  "abdominal pain, change in bowel habits, or black/ bloody stools  Genito-Urinary ROS: no dysuria, trouble voiding, or hematuria  Musculoskeletal ROS: negative for gait disturbance or muscular weakness  Neurological ROS: no syncope or seizures; no ataxia  Dermatological ROS: negative for pruritis, rash and jaundice      Physical Exam:  /86   Pulse 83   Ht 5' 8" (1.727 m)   Wt 73.7 kg (162 lb 7.7 oz)   BMI 24.70 kg/m²   General appearance: alert, cooperative, no distress  Constitutional:Oriented to person, place, and time.appears well-developed and well-nourished.   HEENT: Normocephalic, atraumatic, neck symmetrical, no nasal discharge   Eyes: conjunctivae/corneas clear, PERRL, EOM's intact  Lungs: clear to auscultation bilaterally, no dullness to percussion bilaterally  Heart: regular rate and rhythm without rub; no displacement of the PMI   Abdomen: soft, non-tender; bowel sounds normoactive; no organomegaly  Extremities: extremities symmetric; no clubbing, cyanosis, or edema  Integument: Skin color, texture, turgor normal; no rashes; hair distrubution normal  Neurologic:   Mental status: alert and awake, oriented x 4, comprehension, naming, and repetition intact. No right to left confusion. Performs serial 7's without difficulty .No dysarthria.  CN 2-12: pupils 4 mm bilaterally, reactive to light. Fundi without papilledema. Visual fields full to confrontation. EOM full without nystagmus. Face sensation normal in all distributions. Face symmetric. Hearing grossly intact. Palate elevates well. Tongue midline without atrophy or fasciculations.  Motor: 5/5 all over  Sensory: intact in all modalities.  DTR's: 2+ all over.  Plantars: no tested.  Coordination: finger to nose and heel-knee-shin intact.  Gait: no ataxia or bradykinesia     LABS:    Complete Blood Count  Lab Results   Component Value Date    RBC 5.15 08/23/2023    HGB 15.2 08/23/2023    HCT 47.8 08/23/2023    MCV 93 08/23/2023    MCH 29.5 08/23/2023 "    MCHC 31.8 (L) 08/23/2023    RDW 14.7 (H) 08/23/2023     08/23/2023    MPV 10.9 08/23/2023    GRAN 6.1 08/23/2023    GRAN 71.7 08/23/2023    LYMPH 1.7 08/23/2023    LYMPH 19.3 08/23/2023    MONO 0.4 08/23/2023    MONO 4.7 08/23/2023    EOS 0.2 08/23/2023    BASO 0.11 08/23/2023    EOSINOPHIL 2.6 08/23/2023    BASOPHIL 1.3 08/23/2023    DIFFMETHOD Automated 08/23/2023       Comprehensive Metabolic Panel  Lab Results   Component Value Date    GLU 73 07/25/2023    BUN 12 07/25/2023    CREATININE 1.1 07/25/2023     07/25/2023    K 4.2 07/25/2023     07/25/2023    PROT 7.8 07/25/2023    ALBUMIN 4.0 07/25/2023    BILITOT 0.3 07/25/2023    AST 19 07/25/2023    ALKPHOS 117 07/25/2023    CO2 28 07/25/2023    ALT 12 07/25/2023    ANIONGAP 10 07/25/2023    EGFRNONAA >60.0 04/28/2022    ESTGFRAFRICA >60.0 04/28/2022       TSH  Lab Results   Component Value Date    TSH 0.92 05/12/2020         Assessment: 57 y/o with SLE, RA, Raynaud's disease, HTN, stroke without residual deficits, chronic tremors both hands, presents for evaluation of recent onset of isolated dizziness/lightheadedness that developed following a fall in which she struck her head.  Neuro exam is unremarkable and she endorses progressive improvement of her symptoms.  Suspect dizziness in the context of mild post concussive syndrome.   Will defer neuro testing at this moment and see how her dizziness evolve over the next couple of weeks.  Advised to call me with any questions, concerns, or new developments.          ICD-10-CM ICD-9-CM    1. Dizziness  R42 780.4 Ambulatory referral/consult to Neurology        The encounter diagnosis was Dizziness.      Plan:  1) Dizziness: as above  2) SLE  3) RA  4) Raynaud's disease  5) HTN  6) Stroke without residual deficits  7) Chronic tremors both hands            No orders of the defined types were placed in this encounter.          Jose Mensah MD

## 2023-10-02 ENCOUNTER — PATIENT MESSAGE (OUTPATIENT)
Dept: ADMINISTRATIVE | Facility: OTHER | Age: 58
End: 2023-10-02
Payer: MEDICARE

## 2023-10-04 DIAGNOSIS — J84.9 ILD (INTERSTITIAL LUNG DISEASE): Primary | ICD-10-CM

## 2023-10-05 DIAGNOSIS — I73.01 RAYNAUD'S DISEASE WITH GANGRENE: ICD-10-CM

## 2023-10-05 RX ORDER — SILDENAFIL CITRATE 20 MG/1
20 TABLET ORAL 3 TIMES DAILY
Qty: 270 TABLET | Refills: 3 | Status: SHIPPED | OUTPATIENT
Start: 2023-10-05 | End: 2023-11-29 | Stop reason: SDUPTHER

## 2023-10-05 RX ORDER — MYCOPHENOLATE MOFETIL 500 MG/1
1500 TABLET ORAL 2 TIMES DAILY
Qty: 180 TABLET | Refills: 1 | Status: SHIPPED | OUTPATIENT
Start: 2023-10-05 | End: 2023-11-29 | Stop reason: SDUPTHER

## 2023-10-05 RX ORDER — NIFEDIPINE 30 MG/1
60 TABLET, FILM COATED, EXTENDED RELEASE ORAL
Qty: 180 TABLET | Refills: 3 | Status: SHIPPED | OUTPATIENT
Start: 2023-10-05 | End: 2023-11-29 | Stop reason: SDUPTHER

## 2023-10-27 ENCOUNTER — LAB VISIT (OUTPATIENT)
Dept: LAB | Facility: HOSPITAL | Age: 58
End: 2023-10-27
Attending: INTERNAL MEDICINE
Payer: COMMERCIAL

## 2023-10-27 DIAGNOSIS — Z79.899 LONG-TERM USE OF PLAQUENIL: ICD-10-CM

## 2023-10-27 LAB
BILIRUB UR QL STRIP: NEGATIVE
CLARITY UR REFRACT.AUTO: CLEAR
COLOR UR AUTO: YELLOW
CREAT UR-MCNC: 63 MG/DL (ref 15–325)
GLUCOSE UR QL STRIP: NEGATIVE
HGB UR QL STRIP: NEGATIVE
KETONES UR QL STRIP: NEGATIVE
LEUKOCYTE ESTERASE UR QL STRIP: ABNORMAL
MICROSCOPIC COMMENT: NORMAL
NITRITE UR QL STRIP: NEGATIVE
PH UR STRIP: 7 [PH] (ref 5–8)
PROT UR QL STRIP: NEGATIVE
PROT UR-MCNC: <7 MG/DL (ref 0–15)
PROT/CREAT UR: NORMAL MG/G{CREAT} (ref 0–0.2)
RBC #/AREA URNS AUTO: 1 /HPF (ref 0–4)
SP GR UR STRIP: 1.01 (ref 1–1.03)
SQUAMOUS #/AREA URNS AUTO: 2 /HPF
URN SPEC COLLECT METH UR: ABNORMAL
WBC #/AREA URNS AUTO: 3 /HPF (ref 0–5)

## 2023-10-27 PROCEDURE — 82570 ASSAY OF URINE CREATININE: CPT | Mod: TXP | Performed by: INTERNAL MEDICINE

## 2023-10-27 PROCEDURE — 81001 URINALYSIS AUTO W/SCOPE: CPT | Mod: NTX | Performed by: INTERNAL MEDICINE

## 2023-11-12 ENCOUNTER — PATIENT MESSAGE (OUTPATIENT)
Dept: RHEUMATOLOGY | Facility: CLINIC | Age: 58
End: 2023-11-12
Payer: MEDICARE

## 2023-11-24 ENCOUNTER — TELEPHONE (OUTPATIENT)
Dept: TRANSPLANT | Facility: CLINIC | Age: 58
End: 2023-11-24
Payer: MEDICARE

## 2023-11-24 NOTE — TELEPHONE ENCOUNTER
Spoke to patient regarding appointments. Rescheduled to 11/30 as requested. Patient stated she did not want to come on that day, but rather the same day as rheumatology. Accommodated request.  ----- Message from Edis Burgess sent at 11/24/2023  4:15 PM CST -----  Type:  Patient Returning Call    Who Called:pt  Who Left Message for Patient:  Does the patient know what this is regarding?:appt  Would the patient rather a call back or a response via [facility]? Call  Best Call Back Number:[phone]  Additional Information: pt states she had an appt on 11/30 and she accidentally cancelled and would like to see if provider is able to put it back on her chart.

## 2023-11-29 DIAGNOSIS — J84.9 ILD (INTERSTITIAL LUNG DISEASE): ICD-10-CM

## 2023-11-29 DIAGNOSIS — M32.9 SYSTEMIC LUPUS ERYTHEMATOSUS, UNSPECIFIED SLE TYPE, UNSPECIFIED ORGAN INVOLVEMENT STATUS: Primary | ICD-10-CM

## 2023-11-29 DIAGNOSIS — I73.01 RAYNAUD'S DISEASE WITH GANGRENE: ICD-10-CM

## 2023-11-29 RX ORDER — MYCOPHENOLATE MOFETIL 500 MG/1
1500 TABLET ORAL 2 TIMES DAILY
Qty: 180 TABLET | Refills: 0 | Status: SHIPPED | OUTPATIENT
Start: 2023-11-29 | End: 2024-01-19 | Stop reason: SDUPTHER

## 2023-11-29 RX ORDER — NIFEDIPINE 30 MG/1
60 TABLET, FILM COATED, EXTENDED RELEASE ORAL DAILY
Qty: 180 TABLET | Refills: 0 | Status: SHIPPED | OUTPATIENT
Start: 2023-11-29 | End: 2024-01-19 | Stop reason: SDUPTHER

## 2023-11-29 RX ORDER — SILDENAFIL CITRATE 20 MG/1
20 TABLET ORAL 3 TIMES DAILY
Qty: 270 TABLET | Refills: 0 | Status: SHIPPED | OUTPATIENT
Start: 2023-11-29 | End: 2023-11-29 | Stop reason: SDUPTHER

## 2023-11-29 RX ORDER — DICLOFENAC SODIUM 10 MG/G
GEL TOPICAL 4 TIMES DAILY
Qty: 100 G | Refills: 0 | Status: SHIPPED | OUTPATIENT
Start: 2023-11-29 | End: 2024-01-19 | Stop reason: SDUPTHER

## 2023-11-29 RX ORDER — SILDENAFIL CITRATE 20 MG/1
20 TABLET ORAL 3 TIMES DAILY
Qty: 270 TABLET | Refills: 0 | Status: SHIPPED | OUTPATIENT
Start: 2023-11-29 | End: 2024-01-19 | Stop reason: SDUPTHER

## 2023-11-29 RX ORDER — HYDROXYCHLOROQUINE SULFATE 200 MG/1
200 TABLET, FILM COATED ORAL 2 TIMES DAILY
Qty: 180 TABLET | Refills: 0 | Status: SHIPPED | OUTPATIENT
Start: 2023-11-29 | End: 2024-01-19 | Stop reason: SDUPTHER

## 2024-01-19 ENCOUNTER — OFFICE VISIT (OUTPATIENT)
Dept: TRANSPLANT | Facility: CLINIC | Age: 59
End: 2024-01-19
Payer: MEDICARE

## 2024-01-19 ENCOUNTER — LAB VISIT (OUTPATIENT)
Dept: LAB | Facility: HOSPITAL | Age: 59
End: 2024-01-19
Attending: INTERNAL MEDICINE
Payer: MEDICARE

## 2024-01-19 ENCOUNTER — PATIENT MESSAGE (OUTPATIENT)
Dept: RHEUMATOLOGY | Facility: CLINIC | Age: 59
End: 2024-01-19

## 2024-01-19 ENCOUNTER — HOSPITAL ENCOUNTER (OUTPATIENT)
Dept: PULMONOLOGY | Facility: CLINIC | Age: 59
Discharge: HOME | End: 2024-01-19
Payer: MEDICARE

## 2024-01-19 ENCOUNTER — TELEPHONE (OUTPATIENT)
Dept: CARDIOLOGY | Facility: CLINIC | Age: 59
End: 2024-01-19
Payer: MEDICARE

## 2024-01-19 ENCOUNTER — OFFICE VISIT (OUTPATIENT)
Dept: RHEUMATOLOGY | Facility: CLINIC | Age: 59
End: 2024-01-19
Payer: COMMERCIAL

## 2024-01-19 VITALS
BODY MASS INDEX: 25.15 KG/M2 | WEIGHT: 156.5 LBS | SYSTOLIC BLOOD PRESSURE: 157 MMHG | OXYGEN SATURATION: 100 % | HEIGHT: 66 IN | TEMPERATURE: 98 F | DIASTOLIC BLOOD PRESSURE: 87 MMHG | HEART RATE: 73 BPM

## 2024-01-19 VITALS
HEIGHT: 68 IN | HEART RATE: 82 BPM | SYSTOLIC BLOOD PRESSURE: 116 MMHG | BODY MASS INDEX: 23.72 KG/M2 | WEIGHT: 156.5 LBS | DIASTOLIC BLOOD PRESSURE: 75 MMHG

## 2024-01-19 DIAGNOSIS — R00.2 PALPITATIONS: Primary | ICD-10-CM

## 2024-01-19 DIAGNOSIS — M32.19 OTHER SYSTEMIC LUPUS ERYTHEMATOSUS WITH OTHER ORGAN INVOLVEMENT: ICD-10-CM

## 2024-01-19 DIAGNOSIS — J84.9 ILD (INTERSTITIAL LUNG DISEASE): ICD-10-CM

## 2024-01-19 DIAGNOSIS — M32.9 SYSTEMIC LUPUS ERYTHEMATOSUS, UNSPECIFIED SLE TYPE, UNSPECIFIED ORGAN INVOLVEMENT STATUS: ICD-10-CM

## 2024-01-19 DIAGNOSIS — J84.9 ILD (INTERSTITIAL LUNG DISEASE): Primary | ICD-10-CM

## 2024-01-19 DIAGNOSIS — J30.9 ALLERGIC RHINITIS, UNSPECIFIED SEASONALITY, UNSPECIFIED TRIGGER: ICD-10-CM

## 2024-01-19 DIAGNOSIS — I73.01 RAYNAUD'S DISEASE WITH GANGRENE: ICD-10-CM

## 2024-01-19 DIAGNOSIS — R06.02 SHORTNESS OF BREATH: Primary | ICD-10-CM

## 2024-01-19 DIAGNOSIS — Z79.899 LONG-TERM USE OF PLAQUENIL: ICD-10-CM

## 2024-01-19 DIAGNOSIS — J84.9 INTERSTITIAL PULMONARY DISEASE, UNSPECIFIED: ICD-10-CM

## 2024-01-19 LAB
ALBUMIN SERPL BCP-MCNC: 3.5 G/DL (ref 3.5–5.2)
ALP SERPL-CCNC: 95 U/L (ref 55–135)
ALT SERPL W/O P-5'-P-CCNC: 11 U/L (ref 10–44)
ANION GAP SERPL CALC-SCNC: 6 MMOL/L (ref 8–16)
AST SERPL-CCNC: 19 U/L (ref 10–40)
BASOPHILS # BLD AUTO: 0.1 K/UL (ref 0–0.2)
BASOPHILS NFR BLD: 1.3 % (ref 0–1.9)
BILIRUB SERPL-MCNC: 0.4 MG/DL (ref 0.1–1)
BUN SERPL-MCNC: 10 MG/DL (ref 6–20)
C3 SERPL-MCNC: 79 MG/DL (ref 50–180)
C4 SERPL-MCNC: 18 MG/DL (ref 11–44)
CALCIUM SERPL-MCNC: 9 MG/DL (ref 8.7–10.5)
CHLORIDE SERPL-SCNC: 107 MMOL/L (ref 95–110)
CO2 SERPL-SCNC: 26 MMOL/L (ref 23–29)
CREAT SERPL-MCNC: 0.7 MG/DL (ref 0.5–1.4)
CRP SERPL-MCNC: 2.1 MG/L (ref 0–8.2)
DIFFERENTIAL METHOD BLD: ABNORMAL
DSDNA AB SER-ACNC: NORMAL [IU]/ML
EOSINOPHIL # BLD AUTO: 0.3 K/UL (ref 0–0.5)
EOSINOPHIL NFR BLD: 4.2 % (ref 0–8)
ERYTHROCYTE [DISTWIDTH] IN BLOOD BY AUTOMATED COUNT: 16.1 % (ref 11.5–14.5)
ERYTHROCYTE [SEDIMENTATION RATE] IN BLOOD BY PHOTOMETRIC METHOD: 6 MM/HR (ref 0–36)
EST. GFR  (NO RACE VARIABLE): >60 ML/MIN/1.73 M^2
GLUCOSE SERPL-MCNC: 90 MG/DL (ref 70–110)
HCT VFR BLD AUTO: 42.4 % (ref 37–48.5)
HGB BLD-MCNC: 13.6 G/DL (ref 12–16)
IMM GRANULOCYTES # BLD AUTO: 0.03 K/UL (ref 0–0.04)
IMM GRANULOCYTES NFR BLD AUTO: 0.4 % (ref 0–0.5)
LYMPHOCYTES # BLD AUTO: 3.4 K/UL (ref 1–4.8)
LYMPHOCYTES NFR BLD: 44.6 % (ref 18–48)
MCH RBC QN AUTO: 29.5 PG (ref 27–31)
MCHC RBC AUTO-ENTMCNC: 32.1 G/DL (ref 32–36)
MCV RBC AUTO: 92 FL (ref 82–98)
MONOCYTES # BLD AUTO: 1.2 K/UL (ref 0.3–1)
MONOCYTES NFR BLD: 16.1 % (ref 4–15)
NEUTROPHILS # BLD AUTO: 2.5 K/UL (ref 1.8–7.7)
NEUTROPHILS NFR BLD: 33.4 % (ref 38–73)
NRBC BLD-RTO: 0 /100 WBC
PLATELET # BLD AUTO: 575 K/UL (ref 150–450)
PMV BLD AUTO: 10.2 FL (ref 9.2–12.9)
POTASSIUM SERPL-SCNC: 3.9 MMOL/L (ref 3.5–5.1)
PROT SERPL-MCNC: 7.2 G/DL (ref 6–8.4)
RBC # BLD AUTO: 4.61 M/UL (ref 4–5.4)
SODIUM SERPL-SCNC: 139 MMOL/L (ref 136–145)
WBC # BLD AUTO: 7.58 K/UL (ref 3.9–12.7)

## 2024-01-19 PROCEDURE — 99999 PR PBB SHADOW E&M-EST. PATIENT-LVL III: CPT | Mod: PBBFAC,TXP,, | Performed by: INTERNAL MEDICINE

## 2024-01-19 PROCEDURE — 99214 OFFICE O/P EST MOD 30 MIN: CPT | Mod: 25,NTX,S$GLB, | Performed by: NURSE PRACTITIONER

## 2024-01-19 PROCEDURE — 3008F BODY MASS INDEX DOCD: CPT | Mod: CPTII,NTX,S$GLB, | Performed by: NURSE PRACTITIONER

## 2024-01-19 PROCEDURE — 94729 DIFFUSING CAPACITY: CPT | Mod: NTX,S$GLB,, | Performed by: INTERNAL MEDICINE

## 2024-01-19 PROCEDURE — 1160F RVW MEDS BY RX/DR IN RCRD: CPT | Mod: CPTII,NTX,S$GLB, | Performed by: NURSE PRACTITIONER

## 2024-01-19 PROCEDURE — 3078F DIAST BP <80 MM HG: CPT | Mod: CPTII,NTX,S$GLB, | Performed by: INTERNAL MEDICINE

## 2024-01-19 PROCEDURE — 94726 PLETHYSMOGRAPHY LUNG VOLUMES: CPT | Mod: NTX,S$GLB,, | Performed by: INTERNAL MEDICINE

## 2024-01-19 PROCEDURE — 99215 OFFICE O/P EST HI 40 MIN: CPT | Mod: NTX,S$GLB,, | Performed by: INTERNAL MEDICINE

## 2024-01-19 PROCEDURE — 1159F MED LIST DOCD IN RCRD: CPT | Mod: CPTII,NTX,S$GLB, | Performed by: INTERNAL MEDICINE

## 2024-01-19 PROCEDURE — 85652 RBC SED RATE AUTOMATED: CPT | Mod: TXP | Performed by: INTERNAL MEDICINE

## 2024-01-19 PROCEDURE — 86160 COMPLEMENT ANTIGEN: CPT | Mod: 59,NTX | Performed by: INTERNAL MEDICINE

## 2024-01-19 PROCEDURE — 3074F SYST BP LT 130 MM HG: CPT | Mod: CPTII,NTX,S$GLB, | Performed by: INTERNAL MEDICINE

## 2024-01-19 PROCEDURE — 86160 COMPLEMENT ANTIGEN: CPT | Mod: NTX | Performed by: INTERNAL MEDICINE

## 2024-01-19 PROCEDURE — 36415 COLL VENOUS BLD VENIPUNCTURE: CPT | Mod: TXP | Performed by: INTERNAL MEDICINE

## 2024-01-19 PROCEDURE — 99999 PR PBB SHADOW E&M-EST. PATIENT-LVL IV: CPT | Mod: PBBFAC,TXP,, | Performed by: NURSE PRACTITIONER

## 2024-01-19 PROCEDURE — 80053 COMPREHEN METABOLIC PANEL: CPT | Mod: TXP | Performed by: INTERNAL MEDICINE

## 2024-01-19 PROCEDURE — 86140 C-REACTIVE PROTEIN: CPT | Mod: NTX | Performed by: INTERNAL MEDICINE

## 2024-01-19 PROCEDURE — 3079F DIAST BP 80-89 MM HG: CPT | Mod: CPTII,NTX,S$GLB, | Performed by: NURSE PRACTITIONER

## 2024-01-19 PROCEDURE — 1159F MED LIST DOCD IN RCRD: CPT | Mod: CPTII,NTX,S$GLB, | Performed by: NURSE PRACTITIONER

## 2024-01-19 PROCEDURE — 94010 BREATHING CAPACITY TEST: CPT | Mod: NTX,S$GLB,, | Performed by: INTERNAL MEDICINE

## 2024-01-19 PROCEDURE — 86225 DNA ANTIBODY NATIVE: CPT | Mod: NTX | Performed by: INTERNAL MEDICINE

## 2024-01-19 PROCEDURE — 3077F SYST BP >= 140 MM HG: CPT | Mod: CPTII,NTX,S$GLB, | Performed by: NURSE PRACTITIONER

## 2024-01-19 PROCEDURE — 85025 COMPLETE CBC W/AUTO DIFF WBC: CPT | Mod: TXP | Performed by: INTERNAL MEDICINE

## 2024-01-19 PROCEDURE — 3008F BODY MASS INDEX DOCD: CPT | Mod: CPTII,NTX,S$GLB, | Performed by: INTERNAL MEDICINE

## 2024-01-19 RX ORDER — DICLOFENAC SODIUM 10 MG/G
GEL TOPICAL 4 TIMES DAILY
Qty: 100 G | Refills: 11 | Status: SHIPPED | OUTPATIENT
Start: 2024-01-19

## 2024-01-19 RX ORDER — MYCOPHENOLATE MOFETIL 500 MG/1
1500 TABLET ORAL 2 TIMES DAILY
Qty: 180 TABLET | Refills: 4 | Status: SHIPPED | OUTPATIENT
Start: 2024-01-19

## 2024-01-19 RX ORDER — NIFEDIPINE 30 MG/1
60 TABLET, FILM COATED, EXTENDED RELEASE ORAL DAILY
Qty: 180 TABLET | Refills: 4 | Status: SHIPPED | OUTPATIENT
Start: 2024-01-19

## 2024-01-19 RX ORDER — SILDENAFIL CITRATE 20 MG/1
20 TABLET ORAL 3 TIMES DAILY
Qty: 270 TABLET | Refills: 4 | Status: ACTIVE | OUTPATIENT
Start: 2024-01-19 | End: 2025-01-18

## 2024-01-19 RX ORDER — HYDROXYCHLOROQUINE SULFATE 200 MG/1
200 TABLET, FILM COATED ORAL 2 TIMES DAILY
Qty: 180 TABLET | Refills: 4 | Status: SHIPPED | OUTPATIENT
Start: 2024-01-19

## 2024-01-19 NOTE — PROGRESS NOTES
1/17/2024     2:37 PM   Rapid3 Question Responses and Scores   MDHAQ Score 0.8   Psychologic Score 4.4   Pain Score 6   When you awakened in the morning OVER THE LAST WEEK, did you feel stiff? No   Fatigue Score 10   Global Health Score 8.5   RAPID3 Score 5.72     Answers submitted by the patient for this visit:  Rheumatology Questionnaire (Submitted on 1/17/2024)  fever: No  eye redness: No  mouth sores: No  headaches: Yes  shortness of breath: Yes  chest pain: No  trouble swallowing: No  diarrhea: No  constipation: No  unexpected weight change: No  genital sore: No  dysuria: No  Bruises or bleeds easily: No  cough: Yes

## 2024-01-19 NOTE — LETTER
January 19, 2024        Laurel Johnson  [address]  [address]  [place] [address]  Phone: [phone]  Fax: [phone]             [facility] 1st [facility]  [address]  [facility] [address]  Phone: [phone]   Patient: Christen Bowman   MR Number: [id number]   YOB: 1965   Date of Visit: 1/19/2024       Dear Dr. Laurel Johnson    Thank you for referring Christen Bowman to me for evaluation. Attached you will find relevant portions of my assessment and plan of care.    If you have questions, please do not hesitate to call me. I look forward to following Christen Bowman along with you.    Sincerely,    Luis Higgins NP    Enclosure    If you would like to receive this communication electronically, please contact [email or web address] or [phone] to request [email or web address] access.    [email or web address] is a tool which provides read-only access to select patient information with whom you have a relationship. Its easy to use and provides real time access to review your patients record including encounter summaries, notes, results, and demographic information.    If you feel you have received this communication in error or would no longer like to receive these types of communications, please e-mail [email or web address]

## 2024-01-19 NOTE — PROGRESS NOTES
Subjective:       Patient ID: Christen Bowman is a 55 y.o. female.    Chief Complaint: No chief complaint on file.    HPI 55 year old F with PMH of HTN, hypothyroidism, SLE, RA here to establish care. She was diagnosed with SLE in 1996  when she presented with +KENNY, arthritis , rash, raynauds,pleurisy (pericarditis),  and thrombocytopenia.  She reports she came in to hospital due to chest pain and pain/swelling in hands and elbows.  She was told to have pericardial effusion.   She was hospitalized for thrombocytopenia around age 22 and was treated with high dose steroids and Iv IG/plasmapheresis. She required splenectomy in   Due to thrombocytopenia.  She had one first trimester pregnancy loss. She was initially put on plaquenil when she was diagnosed but it was stopped at some point.        About a year ago (1/2020), she developed aphasia/confusion and had CVA. She was treated with high doses of steroids and Iv IG but no anticoagulation.  Since the stroke, she is more forgetful.        She as recently hospitalized in outside hospital for arthritis, raynauds causing ulcerations with digital ischemia and bacteremia.   It appears on December 12,2020, she developed URI symptoms (covid test-negative). She was having sinus drainage. She went to urgent care.  She went home and the next day,she noticed raynauds in hands and feet.  Within a matter of hours, she developed the blisters in lower extremities.   She was noted to have platelets of 5000 and creat 1.6 (baseline 0.64). White count had increased to 30 thousand. During admission, she developed severe ulcerations in her lower legs and toes and gangrene developed in toes. She was treated with high doses of steroids. She was also treated with antibiotics for gram positive cocci in blood cultures. Apparently, she also had bone marrow biopsy which did not show any significant changes and  BALDO did not show endocarditis.  During hospitalization, patient developed worsening  ulcerations and gangrene of multiple toes. She had lower extremity US which did not show DVT and was not put on anticoagulation. She was discharged on prednisone 80mg daily, mtx 10mg po week, diltiazem 360mg ER, nystatin, tylenol-3, estradiol and HCTZ 25mg poqday.        She is on clindamycin 300mg po q8 hours , prednisone 10mg a day, MTX 4 pills once a week, folic acid, oxycodone 10/325mg po qhs. She saw Dr. Yash Santana about 2 weeks ago. At that time, she was put on clindamycin. She saw orthopedic who is considering amputation.  Denies fevers, pleurisy, sob, alopecia. Reports raynauds in hands and feet.    Interval history: She had flu beginning of Jan.  She has episodes of heart palpitations associated with shortness of breath.Denies pleurisy.   She has not been in the hospital. She is off prednisone.  She is taking cellcept 1500mg po BID.She is on sildenafil TID. She is taking nifedipene  60mg po qhs..  She is taking plaquenil 200mg po BID. Denies any rashes, oral ulcers, hair loss, joint pain or swelling.    Past Medical History:   Diagnosis Date    Raynaud's disease     Rheumatoid arteritis     Systemic lupus erythematosus    ,  Review of Systems   Constitutional: Negative for activity change, appetite change, chills, diaphoresis, fatigue and fever.   HENT: Negative for congestion, ear discharge, ear pain, facial swelling, mouth sores, sinus pressure, sneezing, sore throat, tinnitus and trouble swallowing.    Eyes: Negative for photophobia, pain, discharge, redness, itching and visual disturbance.   Respiratory: Negative for apnea, chest tightness, shortness of breath, wheezing and stridor.    Cardiovascular: Negative for leg swelling.   Gastrointestinal: Negative for abdominal distention, abdominal pain, anal bleeding, blood in stool, constipation, diarrhea and nausea.   Endocrine: Negative for cold intolerance and heat intolerance.   Genitourinary: Negative for difficulty urinating and dysuria.    Musculoskeletal: Negative for arthralgias, back pain, gait problem, joint swelling, myalgias, neck pain and neck stiffness.   Skin: Negative for color change, pallor, rash and wound.   Neurological: Negative for dizziness, seizures, light-headedness and numbness.   Hematological: Negative for adenopathy. Does not bruise/bleed easily.   Psychiatric/Behavioral: Negative for sleep disturbance. The patient is not nervous/anxious.              Objective:   There were no vitals taken for this visit.   BP: 142/98  Physical Exam   Constitutional: She is oriented to person, place, and time.   HENT:   Head: Normocephalic and atraumatic.   Right Ear: External ear normal.   Left Ear: External ear normal.   Nose: Nose normal.   Mouth/Throat: Oropharynx is clear and moist. No oropharyngeal exudate.   Eyes: Conjunctivae and EOM are normal. Pupils are equal, round, and reactive to light. Right eye exhibits no discharge. Left eye exhibits no discharge. No scleral icterus.   Neck: No JVD present. No thyromegaly present.   Cardiovascular: Normal rate, regular rhythm, normal heart sounds and intact distal pulses.  Exam reveals no gallop and no friction rub.    No murmur heard.  Pulmonary/Chest: Effort normal and breath sounds normal. No respiratory distress. She has no wheezes. She has no rales. She exhibits no tenderness.   Abdominal: Soft. Bowel sounds are normal. She exhibits no distension and no mass. There is no abdominal tenderness. There is no rebound and no guarding.   Lymphadenopathy:     She has no cervical adenopathy.   Neurological: She is alert and oriented to person, place, and time. No cranial nerve deficit. Gait normal. Coordination normal.   Skin: Skin is dry. No rash noted. No erythema. No pallor.     Psychiatric: Affect and judgment normal.   Musculoskeletal: Tenderness and edema present. No deformity.      Comments: Gangrenous digits in left foot;   Dark Discoloration in second through fourth toes on  right    Lower shins wrapped      labs: 3/3/2021  Wbc-12.6  Hematocrit-37  Plate-389  Labs 2/3/21  [id number]  No data to display     Assessment:     58 year old F with PMH of HTN, hypothyroidism, SLE, RA here for follow up of SLE. She was diagnosed with SLE in 1996  when she presented with +KENNY,+dsdna,  arthritis , rash, raynauds,pleurisy (pericarditis),  and thrombocytopenia. She was recently hospitalized for severe raynauds leading to gangrene in right foot and is s/p partial amputation 3/30/2021.  During that hospitalization, she was noted to have  abnormal CT chest concerning for ILD.    Given her history of CVA, first trimester pregnancy loss, and progressive gangrene, patient was admitted for anticoagulation for suspected APS but APS work up was negative.  She was admitted from 2/5 to 2/11/21 for  epoprostenol for 5 days and pulse dose steroids.     Her main issue now is episodes of shortness of breath, dizziness, and palpitations.  I emailed her cardiologist and will also get echo.  She has appt with pulmonary today.    SLE:  +KENNY,+dsdna,  arthritis , rash, raynauds,pleurisy (pericarditis), thrombocytopenia, severe raynauds requiring amputation.  #raynauds: doing much better.  Continue nifedipine 60mg poq day (takes 30mg tablets)  Continue sildenafil 20mg po TID  Continue cellcept 1500mg po BID  Continue plaquenil twice a day  ( eye exam done around March 2023- per patient clear)  Continue aspirin 81 mg po qday  Consider benylsta injections if needed.  LABS today and before next visit   echo    #ILD: noted on CT chest. Denies symptoms  -cellcept as above  -following in ILD clinic    40 * minutes of total time spent on the encounter, which includes face to face time and non-face to face time preparing to see the patient (eg, review of tests), Obtaining and/or reviewing separately obtained history, Documenting clinical information in the electronic or other health record, Independently interpreting results (not  separately reported) and communicating results to the patient/family/caregiver, or Care coordination (not separately reported).

## 2024-01-19 NOTE — PROGRESS NOTES
ADVANCED LUNG DISEASE CLINIC FOLLOW UP VISIT                                                                                                                                            Reason for Visit:  SLE with lung involvement, ILD    Referring Physician: Luis Higgins NP    History of Present Illness: Christen Bowman is a 58 y.o. female who is on 0L of oxygen.  She is on no assisted ventilation.  Her New [place] Heart Association Class is I and a Karnofsky score of 90% - Able to carry on normal activity: minor symptoms of disease. She is not diabetic.     Followed for SLE-ILD.  Currently on MMF and plaquenil.  Takes verapamil and sildenafil for Raynauds.  Was diagnosed with the flu at the beginning of the year and prescribed Tamiflu.  Doesn't feel back to her baseline, but is improving.  Denies dyspnea or fevers.  Still has an occasional cough.  Remains very active and takes all medications as directed. Follow with rheumatology and cardiology.            Past Medical History:   Diagnosis Date    Essential hypertension 12/23/2019 2:02:07 PM    [facility] Historical - Cardiovascular: Hypertension-No Additional Notes    Essential hypertension 12/23/2019 2:02:07 PM    [facility] Historical - Cardiovascular: Hypertension-No Additional Notes    Raynaud's disease     Rheumatoid arteritis     Systemic lupus erythematosus        Past Surgical History:   Procedure Laterality Date    SPLENECTOMY      TONSILLECTOMY      TUBAL LIGATION         Allergies: Patient has no known allergies.    Current Outpatient Medications   Medication Sig    aspirin (ECOTRIN) 81 MG EC tablet Take 81 mg by mouth once daily.    azelastine (ASTELIN) 137 mcg (0.1 %) nasal spray 1 spray (137 mcg total) by Nasal route 2 (two) times daily.    diclofenac sodium (VOLTAREN) 1 % Gel Apply topically 4 (four) times daily.    estradioL (ESTRACE) 1 MG tablet TAKE ONE TABLET BY MOUTH EVERY DAY    hydroxychloroquine (PLAQUENIL) 200 mg tablet Take 1 tablet  (200 mg total) by mouth 2 (two) times daily.    levothyroxine (SYNTHROID) 50 MCG tablet Take 1 tablet (50 mcg total) by mouth every morning.    mycophenolate (CELLCEPT) 500 mg Tab Take 3 tablets (1,500 mg total) by mouth 2 (two) times daily.    NIFEdipine (ADALAT CC) 30 MG TbSR Take 2 tablets (60 mg total) by mouth once daily.    pregabalin (LYRICA ORAL) Take 150 mg by mouth 2 (two) times a day.    progesterone (PROMETRIUM) 200 MG capsule TAKE ONE CAPSULE BY MOUTH EVERY DAY    sildenafil (REVATIO) 20 mg Tab Take 1 tablet (20 mg total) by mouth 3 (three) times daily.     No current facility-administered medications for this visit.       Immunization History   Administered Date(s) Administered    COVID-19, MRNA, LN-S, PF (Pfizer) (Gray Cap) 03/21/2022    COVID-19, MRNA, LN-S, PF (Pfizer) (Purple Cap) 08/19/2021, 09/09/2021    Influenza - Quadrivalent - PF *Preferred* (6 months and older) 09/22/2013, 10/17/2014, 02/11/2021    Pneumococcal Polysaccharide - 23 Valent 10/17/2014     Family History:    Family History   Problem Relation Age of Onset    Hypertension Mother     Lupus Mother     Cancer Mother     Hypertension Father     Anuerysm Father      Social History     Substance and Sexual Activity   Alcohol Use Not Currently      Social History     Substance and Sexual Activity   Drug Use Yes    Types: Marijuana    Comment: prescribed uses once or twice a month both as a vape and edibles. for her hip pain      Social History     Socioeconomic History    Marital status:    Tobacco Use    Smoking status: Never     Passive exposure: Never    Smokeless tobacco: Never   Substance and Sexual Activity    Alcohol use: Not Currently    Drug use: Yes     Types: Marijuana     Comment: prescribed uses once or twice a month both as a vape and edibles. for her hip pain     Social Determinants of Health     Financial Resource Strain: High Risk (1/17/2024)    Overall Financial Resource Strain (CARDIA)     Difficulty of Paying  Living Expenses: Hard   Food Insecurity: Food Insecurity Present (1/17/2024)    Hunger Vital Sign     Worried About Running Out of Food in the Last Year: Often true     Ran Out of Food in the Last Year: Sometimes true   Transportation Needs: No Transportation Needs (1/17/2024)    PRAPARE - Transportation     Lack of Transportation (Medical): No     Lack of Transportation (Non-Medical): No   Physical Activity: Insufficiently Active (1/17/2024)    Exercise Vital Sign     Days of Exercise per Week: 2 days     Minutes of Exercise per Session: 30 min   Stress: Stress Concern Present (1/17/2024)    [place] [employer] - Occupational Stress Questionnaire     Feeling of Stress : To some extent   Social Connections: Unknown (1/17/2024)    Social Connection and Isolation Panel [NHANES]     Frequency of Communication with Friends and Family: More than three times a week     Frequency of Social Gatherings with Friends and Family: Once a week     Active Member of Clubs or Organizations: Yes     Attends Club or Organization Meetings: More than 4 times per year     Marital Status:    Housing Stability: Unknown (1/17/2024)    Housing Stability Vital Sign     Unable to Pay for Housing in the Last Year: Patient declined     Number of Places Lived in the Last Year: 1     Unstable Housing in the Last Year: No     Review of Systems   Constitutional: Negative.  Negative for chills, diaphoresis, fever, malaise/fatigue and weight loss.   HENT:  Negative for congestion, ear pain, nosebleeds and sinus pain.    Eyes:  Negative for blurred vision, photophobia and pain.   Respiratory:  Negative for cough, hemoptysis, sputum production, shortness of breath and wheezing.    Cardiovascular:  Negative for chest pain, palpitations, claudication and leg swelling.   Gastrointestinal:  Negative for constipation, diarrhea, heartburn, nausea and vomiting.   Genitourinary:  Negative for frequency and urgency.    Musculoskeletal:  Negative for falls, joint pain and myalgias.   Skin:  Negative for rash.   Neurological:  Positive for tremors. Negative for dizziness, focal weakness, weakness and headaches.   Endo/Heme/Allergies:  Negative for environmental allergies and polydipsia. Does not bruise/bleed easily.   Psychiatric/Behavioral:  Negative for depression. The patient is not nervous/anxious.      Vitals  There were no vitals taken for this visit.  Physical Exam  Vitals and nursing note reviewed.   Constitutional:       General: She is not in acute distress.     Appearance: Normal appearance. She is well-developed. She is not diaphoretic.   HENT:      Head: Normocephalic and atraumatic.   Eyes:      General: No scleral icterus.     Extraocular Movements: Extraocular movements intact.      Conjunctiva/sclera: Conjunctivae normal.   Neck:      Thyroid: No thyromegaly.      Vascular: No JVD.      Trachea: No tracheal deviation.   Cardiovascular:      Rate and Rhythm: Normal rate and regular rhythm.      Heart sounds: Normal heart sounds. No murmur heard.     No friction rub. No gallop.   Pulmonary:      Effort: Pulmonary effort is normal. No respiratory distress.      Breath sounds: Normal breath sounds. No stridor. No wheezing, rhonchi or rales.   Chest:      Chest wall: No tenderness.   Abdominal:      General: Bowel sounds are normal. There is no distension.      Palpations: Abdomen is soft.      Tenderness: There is no abdominal tenderness.   Musculoskeletal:         General: No deformity. Normal range of motion.      Cervical back: Normal range of motion and neck supple.   Skin:     General: Skin is warm and dry.      Capillary Refill: Capillary refill takes less than 2 seconds.      Coloration: Skin is not pale.      Findings: No erythema or rash.   Neurological:      Mental Status: She is alert and oriented to person, place, and time.      Cranial Nerves: No cranial nerve deficit.   Psychiatric:         Mood and  Affect: Mood normal.         Judgment: Judgment normal.         Labs:          1/19/2024    10:07 AM 5/23/2023    11:05 AM 11/10/2022     8:32 AM 5/5/2022    11:00 AM   Pulmonary Function Tests   FVC 2.55 liters 2.55 liters 2.57 liters 2.63 liters   FEV1 1.75 liters 1.85 liters 1.86 liters 1.93 liters   TLC (liters) 4.1 liters 3.77 liters 3.87 liters 4.12 liters   DLCO (ml/mmHg sec) 13.54 ml/mmHg sec 15.74 ml/mmHg sec 15.52 ml/mmHg sec 15.5 ml/mmHg sec   FVC% 85 84.5 85 86.5   FEV1% 73.7 77 77 79.8   FEF 25-75 1.1 1.28 1.32 1.26   FEF 25-75% 49.2 56.9 58 54.6   TLC% 77.8 71.5 74 78.2   RV 1.56 1.23 1.3 1.49   RV% 80.6 63.5 68 77.9   DLCO% 55.1 64 62 15.5         11/10/2022     7:58 AM 5/5/2022    10:54 AM   6MW   6MWT Status completed without stopping completed without stopping   Patient Reported Lightheadedness;Leg pain Leg pain   Was O2 used? No No   6MW Distance walked (feet) 1732 feet 1600 feet   Distance walked (meters) 527.91 meters 487.68 meters   Did patient stop? No No   Oxygen Saturation 100 % 98 %   Supplemental Oxygen Room Air Room Air   Heart Rate 86 bpm 79 bpm   Blood Pressure 154/87 141/85   Dann Dyspnea Rating  nothing at all nothing at all   Oxygen Saturation 97 % 98 %   Supplemental Oxygen Room Air Room Air   Heart Rate 130 bpm 108 bpm   Blood Pressure 183/93 192/96   Dann Dyspnea Rating  moderate moderate   Recovery Time (seconds) 192 seconds 197 seconds   Oxygen Saturation 100 % 99 %   Supplemental Oxygen Room Air Room Air   Heart Rate 94 bpm 83 bpm       Imaging:  Results for orders placed during the hospital encounter of 04/25/23    X-Ray Chest PA And Lateral    Narrative  EXAMINATION:  XR CHEST PA AND LATERAL    CLINICAL HISTORY:  r/o rheumatoid nodules; Chest pain, unspecified    TECHNIQUE:  PA and lateral views of the chest were performed.    COMPARISON:  02/06/2021    FINDINGS:  Cardiac size is normal.  Lungs are clear no infiltrate or pulmonary nodules can be seen few fibrotic streaks are  noted but no pleural effusion is noted.    Impression  See above      Electronically signed by: Stanley Monge MD  Date:    04/25/2023  Time:    10:43    Time:    13:05    Results for orders placed during the hospital encounter of 02/05/21    CT Chest Without Contrast    Narrative  EXAMINATION:  CT CHEST WITHOUT CONTRAST    CLINICAL HISTORY:  HRCT please. CT with Mosaic attenuation, left sided reticular opacification with fibrotic appearance and thin-walled cystic changes.  Additionally there are subcentimeter nodular opacities;    TECHNIQUE:  Low dose axial images, sagittal and coronal reformations were obtained from the thoracic inlet to the lung bases. Contrast was not administered.    X-ray dose: DLP= 161.76 mGy-cm    COMPARISON:  CTA abdomen pelvis 02/08/2021    Chest radiograph 02/06/2021    FINDINGS:  Base of Neck:  No significant abnormality.    Thoracic soft tissues:  No significant abnormality.    Aorta: Left-sided aortic arch with normal caliber, contour, and course with no significant atherosclerotic calcifications.  Common origin of the brachiocephalic and left common carotid arteries.    Heart: Normal size.  No significant coronary artery calcific atherosclerosis.    Pericardium: No pericardial fluid or pericardial calcification.    Pulmonary vasculature: Pulmonary arteries distribute normally. There are four pulmonary veins that return to the left atrium.    Xenia/Mediastinum:  No lymph node enlargement on this noncontrast CT. Hilar contours are unremarkable.    Airways:  Trachea is midline and proximal airways are patent without significant abnormality.    Pleura: No pleural thickening, pleural calcification, or pleural fluid.  No pneumothorax.    Lungs:    -Scattered bilateral pulmonary nodules, with the largest on the right a pleural based nodule in the apical segment of the right upper lobe measuring 0.6 cm (axial series 4, image 117) and the largest on the left in the anteromedial basal segment  of the left lower lobe measuring 0.3 cm (axial series 4, image 295).    -Subpleural reticular opacities and traction bronchiectasis, most prominent in the basilar segments of the left lower lobe and to a lesser extent the basilar segments of the right lower lobe.  Honeycombing is present in the left lower lobe.    -Subcentimeter densely calcified granuloma in the posterior basal segment of the left lower lobe reflects remote infection.    Esophagus: Normal in course and caliber.    Upper abdomen: No acute intra-abdominal abnormality.  Spleen is surgically absent.    Bones:  Degenerative changes of the visualized osseous structures without acute fracture or lytic or sclerotic lesions.    Impression  1. There are subpleural reticular opacities and traction bronchiectasis, most prominent in the basilar segments of the left lower lobe and to a lesser extent in the basilar segments of the right lower lobe.  Honeycombing is present in the left lower lobe.  Findings suggest interstitial lung disease, likely UIP versus NSIP.  2. Scattered bilateral pulmonary nodules measuring up to 0.6 cm.  For multiple solid nodules with any 6 mm or greater, Fleischner Society guidelines recommend follow up with non-contrast chest CT at 3-6 months (doing 05/10/2021 and 08/10/2021) and 18-24 months (between 08/10/2022 and 02/10/2023) after discovery.  3. Subcentimeter densely calcified granuloma in the posterior basal segment of the left lower lobe reflects remote infection.    Electronically signed by resident: Celso Butler  Date:    02/10/2021  Time:    11:20    Electronically signed by: Yoselyn Vasquez MD  Date:    02/10/2021  Time:    11:41      Cardiodiagnostics:  Results for orders placed during the hospital encounter of 02/05/21    Echo Color Flow Doppler? Yes    Interpretation Summary  · Tachycardia was present during the study  · The left ventricle is normal in size with concentric remodeling and hyperdynamic systolic function. The  estimated ejection fraction is 75%  · Normal left ventricular diastolic function.  · Normal right ventricular size with normal right ventricular systolic function.  · The estimated PA systolic pressure is 21 mmHg.  · Normal central venous pressure (3 mmHg).          Assessment:  1. ILD (interstitial lung disease)    2. Other systemic lupus erythematosus with other organ involvement    3. Raynaud's disease with gangrene    4. Interstitial pulmonary disease, unspecified    5. Allergic rhinitis, unspecified seasonality, unspecified trigger      Plan:   Followed for SLE-ILD.  CT imaging consistent with NSIP and less likely UIP.  FVC and DLCO stable. TTE does not show evidence of PH (sildenafil for Raynaud's and not PH).  Overall stable from a respiratory standpoint. Still not fully back to baseline from the flu.  No progression to warrant anti-fibrotic therapy.  Will check repeat chest CT.  Echo ordered by Alex--will follow up on results.     Followed with Dr. Johnson for SLE.  Currently on MMF and plaquenil.  Continue with current therapy.    On Sildenafil and Verapamil for Raynaud's.  Continue with current therapy.    Continue current therapy and follow ups with PCP and cardiology.    Continue with nasal steroid and antihistamine for allergic rhinitis.    Follow-up in 3 months with repeat PFTs.    Luis Higgins NP  Lung Transplant/Advanced Lung Disease

## 2024-01-22 ENCOUNTER — PATIENT MESSAGE (OUTPATIENT)
Dept: RHEUMATOLOGY | Facility: CLINIC | Age: 59
End: 2024-01-22
Payer: MEDICARE

## 2024-01-23 LAB
DLCO ADJ PRE: 13.46 ML/(MIN*MMHG) (ref 18.84–30.3)
DLCO SINGLE BREATH LLN: 18.84
DLCO SINGLE BREATH PRE REF: 55.1 %
DLCO SINGLE BREATH REF: 24.57
DLCOC SBVA LLN: 3.26
DLCOC SBVA PRE REF: 80.4 %
DLCOC SBVA REF: 4.66
DLCOC SINGLE BREATH LLN: 18.84
DLCOC SINGLE BREATH PRE REF: 54.8 %
DLCOC SINGLE BREATH REF: 24.57
DLCOCSBVAULN: 6.05
DLCOCSINGLEBREATHULN: 30.3
DLCOSINGLEBREATHULN: 30.3
DLCOVA LLN: 3.26
DLCOVA PRE REF: 80.9 %
DLCOVA PRE: 3.77 ML/(MIN*MMHG*L) (ref 3.26–6.05)
DLCOVA REF: 4.66
DLCOVAULN: 6.05
DLVAADJ PRE: 3.74 ML/(MIN*MMHG*L) (ref 3.26–6.05)
ERV LLN: -16449.12
ERV PRE REF: 73.1 %
ERV REF: 0.88
ERVULN: ABNORMAL
FEF 25 75 LLN: 1
FEF 25 75 PRE REF: 49.2 %
FEF 25 75 REF: 2.23
FEV05 LLN: 1.14
FEV05 REF: 2
FEV1 FVC LLN: 69
FEV1 FVC PRE REF: 86.2 %
FEV1 FVC REF: 80
FEV1 LLN: 1.76
FEV1 PRE REF: 73.7 %
FEV1 REF: 2.38
FRCPLETH LLN: 1.99
FRCPLETH PREREF: 78.2 %
FRCPLETH REF: 2.81
FRCPLETHULN: 3.63
FVC LLN: 2.24
FVC PRE REF: 85 %
FVC REF: 3
IVC PRE: 2.39 L (ref 2.24–3.79)
IVC SINGLE BREATH LLN: 2.24
IVC SINGLE BREATH PRE REF: 79.8 %
IVC SINGLE BREATH REF: 3
IVCSINGLEBREATHULN: 3.79
LLN IC: -16447.69
PEF LLN: 4.06
PEF PRE REF: 74.1 %
PEF REF: 6.19
PHYSICIAN COMMENT: ABNORMAL
PRE DLCO: 13.54 ML/(MIN*MMHG) (ref 18.84–30.3)
PRE ERV: 0.64 L (ref -16449.12–16450.88)
PRE FEF 25 75: 1.1 L/S (ref 1–3.94)
PRE FET 100: 6.1 SEC
PRE FEV05 REF: 66.6 %
PRE FEV1 FVC: 68.89 % (ref 68.57–89.54)
PRE FEV1: 1.75 L (ref 1.76–2.98)
PRE FEV5: 1.33 L (ref 1.14–2.85)
PRE FRC PL: 2.2 L (ref 1.99–3.63)
PRE FVC: 2.55 L (ref 2.24–3.79)
PRE IC: 1.9 L (ref -16447.69–16452.31)
PRE PEF: 4.59 L/S (ref 4.06–8.32)
PRE REF IC: 82.5 %
PRE RV: 1.56 L (ref 1.35–2.51)
PRE TLC: 4.1 L (ref 4.29–6.26)
RAW PRE REF: 99.5 %
RAW PRE: 3.04 CMH2O*S/L (ref 3.06–3.06)
RAW REF: 3.06
REF IC: 2.31
RV LLN: 1.35
RV PRE REF: 80.6 %
RV REF: 1.93
RVTLC LLN: 28
RVTLC PRE REF: 99.8 %
RVTLC PRE: 37.93 % (ref 28.41–47.59)
RVTLC REF: 38
RVTLCULN: 48
RVULN: 2.51
SGAW PRE REF: 117.5 %
SGAW PRE: 0.12 1/(CMH2O*S) (ref 0.1–0.1)
SGAW REF: 0.1
TLC LLN: 4.29
TLC PRE REF: 77.8 %
TLC REF: 5.27
TLC ULN: 6.26
ULN IC: ABNORMAL
VA PRE: 3.6 L (ref 5.12–5.12)
VA SINGLE BREATH LLN: 5.12
VA SINGLE BREATH PRE REF: 70.2 %
VA SINGLE BREATH REF: 5.12
VASINGLEBREATHULN: 5.12
VC LLN: 2.24
VC PRE REF: 85 %
VC PRE: 2.55 L (ref 2.24–3.79)
VC REF: 3
VC ULN: 3.79

## 2024-01-26 ENCOUNTER — HOSPITAL ENCOUNTER (OUTPATIENT)
Dept: RADIOLOGY | Facility: HOSPITAL | Age: 59
Discharge: HOME | End: 2024-01-26
Attending: NURSE PRACTITIONER
Payer: COMMERCIAL

## 2024-01-26 ENCOUNTER — HOSPITAL ENCOUNTER (OUTPATIENT)
Dept: CARDIOLOGY | Facility: HOSPITAL | Age: 59
Discharge: HOME | End: 2024-01-26
Attending: INTERNAL MEDICINE
Payer: COMMERCIAL

## 2024-01-26 DIAGNOSIS — J84.9 ILD (INTERSTITIAL LUNG DISEASE): ICD-10-CM

## 2024-01-26 DIAGNOSIS — R06.02 SHORTNESS OF BREATH: ICD-10-CM

## 2024-01-26 DIAGNOSIS — J84.9 INTERSTITIAL PULMONARY DISEASE, UNSPECIFIED: ICD-10-CM

## 2024-01-26 LAB
AORTIC VALVE CUSP SEPERATION: 1.55 CM
ASCENDING AORTA: 2.2 CM
AV INDEX (PROSTH): 0.81
AV MEAN GRADIENT: 3 MMHG
AV PEAK GRADIENT: 6 MMHG
AV VALVE AREA BY VELOCITY RATIO: 1.64 CM²
AV VALVE AREA: 1.68 CM²
AV VELOCITY RATIO: 0.78
CV ECHO LV RWT: 0.62 CM
DOP CALC AO PEAK VEL: 1.25 M/S
DOP CALC AO VTI: 26.3 CM
DOP CALC LVOT AREA: 2.1 CM2
DOP CALC LVOT DIAMETER: 1.63 CM
DOP CALC LVOT PEAK VEL: 0.98 M/S
DOP CALC LVOT STROKE VOLUME: 44.22 CM3
DOP CALCLVOT PEAK VEL VTI: 21.2 CM
E WAVE DECELERATION TIME: 170 MSEC
ECHO LV POSTERIOR WALL: 1.09 CM (ref 0.6–1.1)
FRACTIONAL SHORTENING: 32 % (ref 28–44)
INFERIOR VENA CAVA SIZE SNIFF: 0.6 CM
INTERVENTRICULAR SEPTUM: 1.23 CM (ref 0.6–1.1)
IVC DIAMETER: 1.5 CM
LEFT ATRIUM SIZE: 2.95 CM
LEFT ATRIUM VOLUME MOD: 25.7 CM3
LEFT INTERNAL DIMENSION IN SYSTOLE: 2.41 CM (ref 2.1–4)
LEFT VENTRICLE DIASTOLIC VOLUME: 51.6 ML
LEFT VENTRICLE SYSTOLIC VOLUME: 20.4 ML
LEFT VENTRICULAR INTERNAL DIMENSION IN DIASTOLE: 3.52 CM (ref 3.5–6)
LEFT VENTRICULAR MASS: 130.03 G
LVOT MG: 2 MMHG
LVOT MV: 0.64 CM/S
MV PEAK A VEL: 0.6 M/S
PISA TR MAX VEL: 1.74 M/S
RA MAJOR: 4.5 CM
RA PRESSURE ESTIMATED: 8 MMHG
RIGHT ATRIAL AREA: 14.2 CM2
RIGHT ATRIUM VOLUME AREA LENGTH APICAL 4 CHAMBER: 37.3 ML
RIGHT VENTRICULAR END-DIASTOLIC DIMENSION: 3.22 CM
RV TB RVSP: 10 MMHG
TDI LATERAL: 0.13 M/S
TDI SEPTAL: 0.11 M/S
TDI: 0.12 M/S
TR MAX PG: 12 MMHG
TRICUSPID ANNULAR PLANE SYSTOLIC EXCURSION: 1.91 CM
TV REST PULMONARY ARTERY PRESSURE: 20 MMHG

## 2024-01-26 PROCEDURE — 93306 TTE W/DOPPLER COMPLETE: CPT | Mod: NTX

## 2024-01-26 PROCEDURE — 71250 CT THORAX DX C-: CPT | Mod: TC,NTX

## 2024-01-29 ENCOUNTER — PATIENT MESSAGE (OUTPATIENT)
Dept: CARDIOLOGY | Facility: CLINIC | Age: 59
End: 2024-01-29
Payer: MEDICARE

## 2024-01-29 ENCOUNTER — TELEPHONE (OUTPATIENT)
Dept: CARDIOLOGY | Facility: CLINIC | Age: 59
End: 2024-01-29
Payer: MEDICARE

## 2024-01-29 DIAGNOSIS — R00.2 PALPITATIONS: ICD-10-CM

## 2024-01-29 DIAGNOSIS — R94.31 NONSPECIFIC ABNORMAL ELECTROCARDIOGRAM (ECG) (EKG): ICD-10-CM

## 2024-01-29 DIAGNOSIS — I10 HTN (HYPERTENSION), BENIGN: Primary | ICD-10-CM

## 2024-01-29 DIAGNOSIS — R06.00 DYSPNEA, UNSPECIFIED TYPE: ICD-10-CM

## 2024-01-29 DIAGNOSIS — R07.9 CHEST PAIN, UNSPECIFIED TYPE: ICD-10-CM

## 2024-01-30 ENCOUNTER — CLINICAL SUPPORT (OUTPATIENT)
Dept: CARDIOLOGY | Facility: HOSPITAL | Age: 59
End: 2024-01-30
Attending: INTERNAL MEDICINE
Payer: COMMERCIAL

## 2024-01-30 DIAGNOSIS — R00.2 PALPITATIONS: ICD-10-CM

## 2024-01-30 PROCEDURE — 93272 ECG/REVIEW INTERPRET ONLY: CPT | Mod: NTX,,, | Performed by: INTERNAL MEDICINE

## 2024-01-30 PROCEDURE — 93271 ECG/MONITORING AND ANALYSIS: CPT | Mod: NTX

## 2024-03-15 NOTE — PROGRESS NOTES
Your results look fine and do not require any change in treatment.     Please contact me if you have any additional concerns.    Sincerely,    Nikita Mcmahan

## 2024-04-30 ENCOUNTER — HOSPITAL ENCOUNTER (OUTPATIENT)
Dept: PULMONOLOGY | Facility: CLINIC | Age: 59
Discharge: HOME | End: 2024-04-30
Payer: MEDICARE

## 2024-04-30 ENCOUNTER — OFFICE VISIT (OUTPATIENT)
Dept: TRANSPLANT | Facility: CLINIC | Age: 59
End: 2024-04-30
Payer: MEDICARE

## 2024-04-30 VITALS
TEMPERATURE: 99 F | BODY MASS INDEX: 25.44 KG/M2 | HEART RATE: 67 BPM | DIASTOLIC BLOOD PRESSURE: 96 MMHG | SYSTOLIC BLOOD PRESSURE: 170 MMHG | HEIGHT: 66 IN | WEIGHT: 158.31 LBS

## 2024-04-30 DIAGNOSIS — J84.9 ILD (INTERSTITIAL LUNG DISEASE): ICD-10-CM

## 2024-04-30 DIAGNOSIS — M32.19 OTHER SYSTEMIC LUPUS ERYTHEMATOSUS WITH OTHER ORGAN INVOLVEMENT: ICD-10-CM

## 2024-04-30 DIAGNOSIS — J30.9 ALLERGIC RHINITIS, UNSPECIFIED SEASONALITY, UNSPECIFIED TRIGGER: ICD-10-CM

## 2024-04-30 DIAGNOSIS — I73.01 RAYNAUD'S DISEASE WITH GANGRENE: ICD-10-CM

## 2024-04-30 DIAGNOSIS — J84.9 ILD (INTERSTITIAL LUNG DISEASE): Primary | ICD-10-CM

## 2024-04-30 DIAGNOSIS — J84.9 INTERSTITIAL PULMONARY DISEASE, UNSPECIFIED: ICD-10-CM

## 2024-04-30 LAB
DLCO SINGLE BREATH LLN: 18.69
DLCO SINGLE BREATH PRE REF: 67.9 %
DLCO SINGLE BREATH REF: 24.43
DLCOC SBVA LLN: 3.24
DLCOC SBVA REF: 4.63
DLCOC SINGLE BREATH LLN: 18.69
DLCOC SINGLE BREATH REF: 24.43
DLCOCSBVAULN: 6.02
DLCOCSINGLEBREATHULN: 30.16
DLCOSINGLEBREATHULN: 30.16
DLCOVA LLN: 3.24
DLCOVA PRE REF: 105.3 %
DLCOVA PRE: 4.88 ML/(MIN*MMHG*L) (ref 3.24–6.02)
DLCOVA REF: 4.63
DLCOVAULN: 6.02
ERV LLN: -16449.13
ERV PRE REF: 123.8 %
ERV REF: 0.87
ERVULN: ABNORMAL
FEF 25 75 LLN: 1
FEF 25 75 PRE REF: 52 %
FEF 25 75 REF: 2.22
FEV05 LLN: 1.13
FEV05 REF: 1.98
FEV1 FVC LLN: 69
FEV1 FVC PRE REF: 87.7 %
FEV1 FVC REF: 80
FEV1 LLN: 1.75
FEV1 PRE REF: 77.2 %
FEV1 REF: 2.37
FRCPLETH LLN: 1.99
FRCPLETH PREREF: 77 %
FRCPLETH REF: 2.81
FRCPLETHULN: 3.63
FVC LLN: 2.23
FVC PRE REF: 87.6 %
FVC REF: 2.99
IVC PRE: 2.52 L (ref 2.23–3.78)
IVC SINGLE BREATH LLN: 2.23
IVC SINGLE BREATH PRE REF: 84.2 %
IVC SINGLE BREATH REF: 2.99
IVCSINGLEBREATHULN: 3.78
LLN IC: -16447.7
PEF LLN: 4
PEF PRE REF: 89.9 %
PEF REF: 6.13
PHYSICIAN COMMENT: ABNORMAL
PRE DLCO: 16.57 ML/(MIN*MMHG) (ref 18.69–30.16)
PRE ERV: 1.07 L (ref -16449.13–16450.87)
PRE FEF 25 75: 1.15 L/S (ref 1–3.93)
PRE FET 100: 8.51 SEC
PRE FEV05 REF: 70.3 %
PRE FEV1 FVC: 70.03 % (ref 68.51–89.54)
PRE FEV1: 1.83 L (ref 1.75–2.97)
PRE FEV5: 1.39 L (ref 1.13–2.84)
PRE FRC PL: 2.17 L (ref 1.99–3.63)
PRE FVC: 2.62 L (ref 2.23–3.78)
PRE IC: 1.55 L (ref -16447.7–16452.3)
PRE PEF: 5.51 L/S (ref 4–8.26)
PRE REF IC: 67.3 %
PRE RV: 1.09 L (ref 1.37–2.52)
PRE TLC: 3.71 L (ref 4.29–6.26)
RAW PRE REF: 128.2 %
RAW PRE: 3.92 CMH2O*S/L (ref 3.06–3.06)
RAW REF: 3.06
REF IC: 2.3
RV LLN: 1.37
RV PRE REF: 56.2 %
RV REF: 1.95
RVTLC LLN: 29
RVTLC PRE REF: 76.8 %
RVTLC PRE: 29.46 % (ref 28.75–47.93)
RVTLC REF: 38
RVTLCULN: 48
RVULN: 2.52
SGAW PRE REF: 96.3 %
SGAW PRE: 0.1 1/(CMH2O*S) (ref 0.1–0.1)
SGAW REF: 0.1
TLC LLN: 4.29
TLC PRE REF: 70.4 %
TLC REF: 5.27
TLC ULN: 6.26
ULN IC: ABNORMAL
VA PRE: 3.4 L (ref 5.12–5.12)
VA SINGLE BREATH LLN: 5.12
VA SINGLE BREATH PRE REF: 66.3 %
VA SINGLE BREATH REF: 5.12
VASINGLEBREATHULN: 5.12
VC LLN: 2.23
VC PRE REF: 87.6 %
VC PRE: 2.62 L (ref 2.23–3.78)
VC REF: 2.99
VC ULN: 3.78

## 2024-04-30 PROCEDURE — 3077F SYST BP >= 140 MM HG: CPT | Mod: CPTII,NTX,S$GLB, | Performed by: NURSE PRACTITIONER

## 2024-04-30 PROCEDURE — 1160F RVW MEDS BY RX/DR IN RCRD: CPT | Mod: CPTII,NTX,S$GLB, | Performed by: NURSE PRACTITIONER

## 2024-04-30 PROCEDURE — 3008F BODY MASS INDEX DOCD: CPT | Mod: CPTII,NTX,S$GLB, | Performed by: NURSE PRACTITIONER

## 2024-04-30 PROCEDURE — 3080F DIAST BP >= 90 MM HG: CPT | Mod: CPTII,NTX,S$GLB, | Performed by: NURSE PRACTITIONER

## 2024-04-30 PROCEDURE — 94729 DIFFUSING CAPACITY: CPT | Mod: NTX,S$GLB,, | Performed by: INTERNAL MEDICINE

## 2024-04-30 PROCEDURE — 94010 BREATHING CAPACITY TEST: CPT | Mod: NTX,S$GLB,, | Performed by: INTERNAL MEDICINE

## 2024-04-30 PROCEDURE — 99999 PR PBB SHADOW E&M-EST. PATIENT-LVL IV: CPT | Mod: PBBFAC,TXP,, | Performed by: NURSE PRACTITIONER

## 2024-04-30 PROCEDURE — 94726 PLETHYSMOGRAPHY LUNG VOLUMES: CPT | Mod: NTX,S$GLB,, | Performed by: INTERNAL MEDICINE

## 2024-04-30 PROCEDURE — 99214 OFFICE O/P EST MOD 30 MIN: CPT | Mod: 25,NTX,S$GLB, | Performed by: NURSE PRACTITIONER

## 2024-04-30 PROCEDURE — 1159F MED LIST DOCD IN RCRD: CPT | Mod: CPTII,NTX,S$GLB, | Performed by: NURSE PRACTITIONER

## 2024-04-30 RX ORDER — PRIMIDONE 50 MG/1
50 TABLET ORAL NIGHTLY
COMMUNITY

## 2024-04-30 NOTE — PROGRESS NOTES
ADVANCED LUNG DISEASE CLINIC FOLLOW UP VISIT                                                                                                                                              Reason for Visit:  SLE with lung involvement, ILD    Referring Physician: Luis Higgins NP    History of Present Illness: Christen Bowman is a 59 y.o. female who is on 0L of oxygen.  She is on no assisted ventilation.  Her New York Heart Association Class is I and a Karnofsky score of 90% - Able to carry on normal activity: minor symptoms of disease. She is not diabetic.     Followed for SLE-ILD.  Currently on MMF and plaquenil.  Takes sildenafil for Raynauds.  Denies dyspnea, cough, or fevers.  Remains very active and takes all medications as directed. Follow with rheumatology and cardiology.            Past Medical History:   Diagnosis Date    Essential hypertension 12/23/2019 2:02:07 PM    [facility] Historical - Cardiovascular: Hypertension-No Additional Notes    Essential hypertension 12/23/2019 2:02:07 PM    [facility] Historical - Cardiovascular: Hypertension-No Additional Notes    Raynaud's disease     Rheumatoid arteritis     Systemic lupus erythematosus        Past Surgical History:   Procedure Laterality Date    SPLENECTOMY      TONSILLECTOMY      TUBAL LIGATION         Allergies: Patient has no known allergies.    Current Outpatient Medications   Medication Sig Dispense Refill    aspirin (ECOTRIN) 81 MG EC tablet Take 81 mg by mouth once daily.      diclofenac sodium (VOLTAREN) 1 % Gel Apply topically 4 (four) times daily. 100 g 11    estradioL (ESTRACE) 1 MG tablet TAKE ONE TABLET BY MOUTH EVERY DAY 30 tablet 11    hydroxychloroquine (PLAQUENIL) 200 mg tablet Take 1 tablet (200 mg total) by mouth 2 (two) times daily. 180 tablet 4    levothyroxine (SYNTHROID) 50 MCG tablet Take 1 tablet (50 mcg total) by mouth every morning. 90 tablet 1    mycophenolate (CELLCEPT) 500 mg Tab Take 3 tablets (1,500 mg total) by  mouth 2 (two) times daily. 180 tablet 4    NIFEdipine (ADALAT CC) 30 MG TbSR Take 2 tablets (60 mg total) by mouth once daily. 180 tablet 4    pregabalin (LYRICA ORAL) Take 150 mg by mouth 2 (two) times a day.      primidone (MYSOLINE) 50 MG Tab Take 50 mg by mouth every evening.      progesterone (PROMETRIUM) 200 MG capsule TAKE ONE CAPSULE BY MOUTH EVERY DAY 30 capsule 11    sildenafil (REVATIO) 20 mg Tab Take 1 tablet (20 mg total) by mouth 3 (three) times daily. 270 tablet 4    azelastine (ASTELIN) 137 mcg (0.1 %) nasal spray 1 spray (137 mcg total) by Nasal route 2 (two) times daily. 30 mL 11     No current facility-administered medications for this visit.       Immunization History   Administered Date(s) Administered    COVID-19, MRNA, LN-S, PF (Pfizer) (Gray Cap) 03/21/2022    COVID-19, MRNA, LN-S, PF (Pfizer) (Purple Cap) 08/19/2021, 09/09/2021    Influenza - Quadrivalent - PF *Preferred* (6 months and older) 09/22/2013, 10/17/2014, 02/11/2021    Pneumococcal Polysaccharide - 23 Valent 10/17/2014     Family History:    Family History   Problem Relation Name Age of Onset    Hypertension Mother      Lupus Mother      Cancer Mother      Hypertension Father      Anuerysm Father       Social History     Substance and Sexual Activity   Alcohol Use Not Currently      Social History     Substance and Sexual Activity   Drug Use Yes    Types: Marijuana    Comment: prescribed uses once or twice a month both as a vape and edibles. for her hip pain      Social History     Socioeconomic History    Marital status:    Tobacco Use    Smoking status: Never     Passive exposure: Never    Smokeless tobacco: Never   Substance and Sexual Activity    Alcohol use: Not Currently    Drug use: Yes     Types: Marijuana     Comment: prescribed uses once or twice a month both as a vape and edibles. for her hip pain     Social Determinants of Health     Financial Resource Strain: High Risk (1/17/2024)    Overall Financial Resource  Strain (CARDIA)     Difficulty of Paying Living Expenses: Hard   Food Insecurity: Food Insecurity Present (1/17/2024)    Hunger Vital Sign     Worried About Running Out of Food in the Last Year: Often true     Ran Out of Food in the Last Year: Sometimes true   Transportation Needs: No Transportation Needs (1/17/2024)    PRAPARE - Transportation     Lack of Transportation (Medical): No     Lack of Transportation (Non-Medical): No   Physical Activity: Insufficiently Active (1/17/2024)    Exercise Vital Sign     Days of Exercise per Week: 2 days     Minutes of Exercise per Session: 30 min   Stress: Stress Concern Present (1/17/2024)    [employer] - Occupational Stress Questionnaire     Feeling of Stress : To some extent   Social Connections: Unknown (1/17/2024)    Social Connection and Isolation Panel [NHANES]     Frequency of Communication with Friends and Family: More than three times a week     Frequency of Social Gatherings with Friends and Family: Once a week     Active Member of Clubs or Organizations: Yes     Attends Club or Organization Meetings: More than 4 times per year     Marital Status:    Housing Stability: Unknown (1/17/2024)    Housing Stability Vital Sign     Unable to Pay for Housing in the Last Year: Patient declined     Number of Places Lived in the Last Year: 1     Unstable Housing in the Last Year: No     Review of Systems   Constitutional: Negative.  Negative for chills, diaphoresis, fever, malaise/fatigue and weight loss.   HENT:  Negative for congestion, ear pain, nosebleeds and sinus pain.    Eyes:  Negative for blurred vision, photophobia and pain.   Respiratory:  Negative for cough, hemoptysis, sputum production, shortness of breath and wheezing.    Cardiovascular:  Negative for chest pain, palpitations, claudication and leg swelling.   Gastrointestinal:  Negative for constipation, diarrhea, heartburn, nausea and vomiting.   Genitourinary:  Negative  for frequency and urgency.   Musculoskeletal:  Positive for joint pain (chronic hip pain). Negative for falls and myalgias.   Skin:  Negative for rash.   Neurological:  Positive for tremors. Negative for dizziness, focal weakness, weakness and headaches.   Endo/Heme/Allergies:  Negative for environmental allergies and polydipsia. Does not bruise/bleed easily.   Psychiatric/Behavioral:  Negative for depression. The patient is not nervous/anxious.      Vitals  There were no vitals taken for this visit.  Physical Exam  Vitals and nursing note reviewed.   Constitutional:       General: She is not in acute distress.     Appearance: Normal appearance. She is well-developed. She is not diaphoretic.   HENT:      Head: Normocephalic and atraumatic.   Eyes:      General: No scleral icterus.     Extraocular Movements: Extraocular movements intact.      Conjunctiva/sclera: Conjunctivae normal.   Neck:      Thyroid: No thyromegaly.      Vascular: No JVD.      Trachea: No tracheal deviation.   Cardiovascular:      Rate and Rhythm: Normal rate and regular rhythm.      Heart sounds: Normal heart sounds. No murmur heard.     No friction rub. No gallop.   Pulmonary:      Effort: Pulmonary effort is normal. No respiratory distress.      Breath sounds: Normal breath sounds. No stridor. No wheezing, rhonchi or rales.   Chest:      Chest wall: No tenderness.   Abdominal:      General: Bowel sounds are normal. There is no distension.      Palpations: Abdomen is soft.      Tenderness: There is no abdominal tenderness.   Musculoskeletal:         General: No deformity. Normal range of motion.      Cervical back: Normal range of motion and neck supple.   Skin:     General: Skin is warm and dry.      Capillary Refill: Capillary refill takes less than 2 seconds.      Coloration: Skin is not pale.      Findings: No erythema or rash.   Neurological:      Mental Status: She is alert and oriented to person, place, and time.      Cranial Nerves: No  cranial nerve deficit.   Psychiatric:         Mood and Affect: Mood normal.         Judgment: Judgment normal.       Labs:          4/30/2024     9:48 AM 1/19/2024    10:07 AM 5/23/2023    11:05 AM 11/10/2022     8:32 AM 5/5/2022    11:00 AM   Pulmonary Function Tests   FVC 2.62 liters 2.55 liters 2.55 liters 2.57 liters 2.63 liters   FEV1 1.83 liters 1.75 liters 1.85 liters 1.86 liters 1.93 liters   TLC (liters) 3.71 liters 4.1 liters 3.77 liters 3.87 liters 4.12 liters   DLCO (ml/mmHg sec) 16.57 ml/mmHg sec 13.54 ml/mmHg sec 15.74 ml/mmHg sec 15.52 ml/mmHg sec 15.5 ml/mmHg sec   FVC% 87.6 85 84.5 85 86.5   FEV1% 77.2 73.7 77 77 79.8   FEF 25-75 1.15 1.1 1.28 1.32 1.26   FEF 25-75% 52 49.2 56.9 58 54.6   TLC% 70.4 77.8 71.5 74 78.2   RV 1.09 1.56 1.23 1.3 1.49   RV% 56.2 80.6 63.5 68 77.9   DLCO% 67.9 55.1 64 62 15.5         11/10/2022     7:58 AM 5/5/2022    10:54 AM   6MW   6MWT Status completed without stopping completed without stopping   Patient Reported Lightheadedness;Leg pain Leg pain   Was O2 used? No No   6MW Distance walked (feet) 1732 feet 1600 feet   Distance walked (meters) 527.91 meters 487.68 meters   Did patient stop? No No   Oxygen Saturation 100 % 98 %   Supplemental Oxygen Room Air Room Air   Heart Rate 86 bpm 79 bpm   Blood Pressure 154/87 141/85   Dann Dyspnea Rating  nothing at all nothing at all   Oxygen Saturation 97 % 98 %   Supplemental Oxygen Room Air Room Air   Heart Rate 130 bpm 108 bpm   Blood Pressure 183/93 192/96   Dann Dyspnea Rating  moderate moderate   Recovery Time (seconds) 192 seconds 197 seconds   Oxygen Saturation 100 % 99 %   Supplemental Oxygen Room Air Room Air   Heart Rate 94 bpm 83 bpm       Imaging:  Results for orders placed during the hospital encounter of 04/25/23    X-Ray Chest PA And Lateral    Narrative  EXAMINATION:  XR CHEST PA AND LATERAL    CLINICAL HISTORY:  r/o rheumatoid nodules; Chest pain, unspecified    TECHNIQUE:  PA and lateral views of the chest  were performed.    COMPARISON:  02/06/2021    FINDINGS:  Cardiac size is normal.  Lungs are clear no infiltrate or pulmonary nodules can be seen few fibrotic streaks are noted but no pleural effusion is noted.    Impression  See above      Electronically signed by: Stanley Monge MD  Date:    04/25/2023  Time:    10:43    Time:    13:05    Results for orders placed during the hospital encounter of 02/05/21    CT Chest Without Contrast    Narrative  EXAMINATION:  CT CHEST WITHOUT CONTRAST    CLINICAL HISTORY:  HRCT please. CT with Mosaic attenuation, left sided reticular opacification with fibrotic appearance and thin-walled cystic changes.  Additionally there are subcentimeter nodular opacities;    TECHNIQUE:  Low dose axial images, sagittal and coronal reformations were obtained from the thoracic inlet to the lung bases. Contrast was not administered.    X-ray dose: DLP= 161.76 mGy-cm    COMPARISON:  CTA abdomen pelvis 02/08/2021    Chest radiograph 02/06/2021    FINDINGS:  Base of Neck:  No significant abnormality.    Thoracic soft tissues:  No significant abnormality.    Aorta: Left-sided aortic arch with normal caliber, contour, and course with no significant atherosclerotic calcifications.  Common origin of the brachiocephalic and left common carotid arteries.    Heart: Normal size.  No significant coronary artery calcific atherosclerosis.    Pericardium: No pericardial fluid or pericardial calcification.    Pulmonary vasculature: Pulmonary arteries distribute normally. There are four pulmonary veins that return to the left atrium.    Xenia/Mediastinum:  No lymph node enlargement on this noncontrast CT. Hilar contours are unremarkable.    Airways:  Trachea is midline and proximal airways are patent without significant abnormality.    Pleura: No pleural thickening, pleural calcification, or pleural fluid.  No pneumothorax.    Lungs:    -Scattered bilateral pulmonary nodules, with the largest on the right a  pleural based nodule in the apical segment of the right upper lobe measuring 0.6 cm (axial series 4, image 117) and the largest on the left in the anteromedial basal segment of the left lower lobe measuring 0.3 cm (axial series 4, image 295).    -Subpleural reticular opacities and traction bronchiectasis, most prominent in the basilar segments of the left lower lobe and to a lesser extent the basilar segments of the right lower lobe.  Honeycombing is present in the left lower lobe.    -Subcentimeter densely calcified granuloma in the posterior basal segment of the left lower lobe reflects remote infection.    Esophagus: Normal in course and caliber.    Upper abdomen: No acute intra-abdominal abnormality.  Spleen is surgically absent.    Bones:  Degenerative changes of the visualized osseous structures without acute fracture or lytic or sclerotic lesions.    Impression  1. There are subpleural reticular opacities and traction bronchiectasis, most prominent in the basilar segments of the left lower lobe and to a lesser extent in the basilar segments of the right lower lobe.  Honeycombing is present in the left lower lobe.  Findings suggest interstitial lung disease, likely UIP versus NSIP.  2. Scattered bilateral pulmonary nodules measuring up to 0.6 cm.  For multiple solid nodules with any 6 mm or greater, Fleischner Society guidelines recommend follow up with non-contrast chest CT at 3-6 months (doing 05/10/2021 and 08/10/2021) and 18-24 months (between 08/10/2022 and 02/10/2023) after discovery.  3. Subcentimeter densely calcified granuloma in the posterior basal segment of the left lower lobe reflects remote infection.    Electronically signed by resident: eClso Butler  Date:    02/10/2021  Time:    11:20    Electronically signed by: Yoselyn Vasquez MD  Date:    02/10/2021  Time:    11:41      CT SCAN OF CHEST WITHOUT CONTRAST:     CPT: 28058     Total DLP: 181.8 mGy-cm; Automatic exposure control was utilized to  limit the radiation dose to the patient.     HISTORY:Interstitial lung disease; interstitial pulmonary disease, unspecified tight.     Comparison: 02/10/2021 at [facility].     Technique: Multiple contiguous axial images were acquired from the thoracic inlet to the upper abdomen without contrast administration. Coronal and sagittal reformatted images were also submitted.     FINDINGS:     The study is limited due to protocol with 5 mm slices compared to 1.25 mm slices on the previous exam.  There is similar fibrosis and honeycombing in the left greater than right lung bases more prominent in the lower lobes but with some extension into the lateral base of the left lower lobe and lingula.  As on the prior exam, there is traction bronchiectasis and reticular and reticulonodular interstitial changes in the left lower lobe greater than right lower lobe greater than elsewhere in both lung bases there is a 4.8 mm nodule there is unchanged in the ventral right lower lobe abutting the minor fissure on series 4, image 35.  There is an unchanged 2.5 mm nodule partially between image slices in the ventral right upper lobe on series 4, image 22.  On the prior outside exam, what is described as a 0.6 cm pleural base nodule more resembles pleural thickening/scarring on today's exam on series 4, image 14; series 9, image 28, and series 10, image 54.  Just cephalad to this, there is an unchanged 3.5 mm pleural base nodule on series 10, image 53 and series 4, image 12.  There is an unchanged 5 mm nodule is pleural based in the lateral ventral right lower lobe on series 4, image 36.  On the same image, there is an unchanged pleural based 3.2 mm nodule in the ventral lateral left lower lobe.  No new lung nodule/mass, suspicious alveolar opacities, effusion, or pneumothorax is present.  The heart is enlarged similar in appearance to the previous exam.  The central pulmonary vessels and aorta are normal in size.   Mediastinal and hilar lymphadenopathy cannot be excluded without intravenous contrast.  The thyroid gland is poorly visualized in the thoracic inlet.  The upper abdomen is grossly unremarkable.     Impression:        1. Similar appearance of findings from interstitial lung disease with basilar predominance described on the previous exam as likely UIP versus NSIP.  2. Multiple sub 6 mm nodules in both lungs are unchanged.  On the prior exam, and area measures 0.6 cm pleural base nodule lateral at the right upper lobe today appears today more as pleural thickening/scarring with no definite nodule in this area.  As per Fleischner Society pulmonary nodule recommendations, recommend a follow-up CT scan in August 2024.  3. Similar cardiomegaly.        Electronically signed by:Herbie Hunt MD  Date:                                            01/26/2024          Cardiodiagnostics:  Results for orders placed during the hospital encounter of 02/05/21    Echo Color Flow Doppler? Yes    Interpretation Summary  · Tachycardia was present during the study  · The left ventricle is normal in size with concentric remodeling and hyperdynamic systolic function. The estimated ejection fraction is 75%  · Normal left ventricular diastolic function.  · Normal right ventricular size with normal right ventricular systolic function.  · The estimated PA systolic pressure is 21 mmHg.  · Normal central venous pressure (3 mmHg).    Results for orders placed during the hospital encounter of 01/26/24    Echo    Interpretation Summary  Normal LV function  EF 60%  Normal filling pressure  Insufficient TR jet for RVSP estimation        Assessment:  1. ILD (interstitial lung disease)    2. Other systemic lupus erythematosus with other organ involvement    3. Raynaud's disease with gangrene    4. Interstitial pulmonary disease, unspecified    5. Allergic rhinitis, unspecified seasonality, unspecified trigger      Plan:   Followed for SLE-ILD.  CT  imaging consistent with NSIP and less likely UIP.  FVC and DLCO stable. TTE does not show evidence of PH (sildenafil for Raynaud's and not PH).  Overall stable from a respiratory standpoint. No progression to warrant anti-fibrotic therapy.      Followed with Dr. Johnson for SLE.  Currently on MMF and plaquenil.  Continue with current therapy.    On Sildenafil for Raynaud's.  Continue with current therapy.    Continue current therapy and follow ups with PCP and cardiology.    Continue with nasal steroid and antihistamine for allergic rhinitis.    Follow-up in 3 months with repeat PFTs, 6MWT, and repeat chest CT per Fleischner guidelines.     Luis Higgins NP  Lung Transplant/Advanced Lung Disease

## 2024-04-30 NOTE — LETTER
April 30, 2024        Laurel Johnson  [address]  [address]  [place] [address]  Phone: [phone]  Fax: [phone]             [facility] 1st Fl  [address]  [facility] [address]  Phone: [phone]   Patient: Christen Bowman   MR Number: [id number]   YOB: 1965   Date of Visit: 4/30/2024       Dear Dr. Laurel Johnson    Thank you for referring Christen Bowman to me for evaluation. Attached you will find relevant portions of my assessment and plan of care.    If you have questions, please do not hesitate to call me. I look forward to following Christen Bowman along with you.    Sincerely,    Luis Higgins NP    Enclosure    If you would like to receive this communication electronically, please contact [email or web address] or [phone] to request [employer] access.    [employer] is a tool which provides read-only access to select patient information with whom you have a relationship. Its easy to use and provides real time access to review your patients record including encounter summaries, notes, results, and demographic information.    If you feel you have received this communication in error or would no longer like to receive these types of communications, please e-mail [email or web address]

## 2024-05-02 ENCOUNTER — PATIENT MESSAGE (OUTPATIENT)
Dept: RHEUMATOLOGY | Facility: CLINIC | Age: 59
End: 2024-05-02
Payer: MEDICARE

## 2024-07-09 ENCOUNTER — LAB VISIT (OUTPATIENT)
Dept: LAB | Facility: HOSPITAL | Age: 59
End: 2024-07-09
Attending: INTERNAL MEDICINE
Payer: MEDICARE

## 2024-07-09 DIAGNOSIS — Z79.899 LONG-TERM USE OF PLAQUENIL: Primary | ICD-10-CM

## 2024-07-09 LAB
ALBUMIN SERPL-MCNC: 4.2 G/DL (ref 3.4–5)
ALBUMIN/GLOB SERPL: 1.3 RATIO
ALP SERPL-CCNC: 101 UNIT/L (ref 50–144)
ALT SERPL-CCNC: 11 UNIT/L (ref 1–45)
ANION GAP SERPL CALC-SCNC: 8 MEQ/L (ref 2–13)
AST SERPL-CCNC: 25 UNIT/L (ref 14–36)
BASOPHILS # BLD AUTO: 0.08 X10(3)/MCL (ref 0.01–0.08)
BASOPHILS NFR BLD AUTO: 1.4 % (ref 0.1–1.2)
BILIRUB SERPL-MCNC: 0.5 MG/DL (ref 0–1)
BILIRUB UR QL STRIP.AUTO: NEGATIVE
BUN SERPL-MCNC: 10 MG/DL (ref 7–20)
C3 SERPL-MCNC: 103 MG/DL (ref 80–173)
C4 SERPL-MCNC: 26 MG/DL (ref 13–46)
CALCIUM SERPL-MCNC: 10 MG/DL (ref 8.4–10.2)
CHLORIDE SERPL-SCNC: 104 MMOL/L (ref 98–110)
CLARITY UR: CLEAR
CO2 SERPL-SCNC: 26 MMOL/L (ref 21–32)
COLOR UR AUTO: YELLOW
CREAT SERPL-MCNC: 0.82 MG/DL (ref 0.66–1.25)
CREAT UR-MCNC: 186 MG/DL
CREAT/UREA NIT SERPL: 12 (ref 12–20)
CRP SERPL-MCNC: <0.5 MG/DL
EOSINOPHIL # BLD AUTO: 0.28 X10(3)/MCL (ref 0.04–0.36)
EOSINOPHIL NFR BLD AUTO: 4.8 % (ref 0.7–7)
ERYTHROCYTE [DISTWIDTH] IN BLOOD BY AUTOMATED COUNT: 13.7 % (ref 11–14.5)
ERYTHROCYTE [SEDIMENTATION RATE] IN BLOOD: 4 MM/HR (ref 0–20)
GFR SERPLBLD CREATININE-BSD FMLA CKD-EPI: 84 ML/MIN/1.73/M2
GLOBULIN SER-MCNC: 3.3 GM/DL (ref 2–3.9)
GLUCOSE SERPL-MCNC: 91 MG/DL (ref 70–115)
GLUCOSE UR QL STRIP: NEGATIVE
HCT VFR BLD AUTO: 43 % (ref 36–48)
HGB BLD-MCNC: 14.2 G/DL (ref 11.8–16)
HGB UR QL STRIP: NEGATIVE
IMM GRANULOCYTES # BLD AUTO: 0.02 X10(3)/MCL (ref 0–0.03)
IMM GRANULOCYTES NFR BLD AUTO: 0.3 % (ref 0–0.5)
KETONES UR QL STRIP: NEGATIVE
LEUKOCYTE ESTERASE UR QL STRIP: NEGATIVE
LYMPHOCYTES # BLD AUTO: 2.29 X10(3)/MCL (ref 1.16–3.74)
LYMPHOCYTES NFR BLD AUTO: 39.3 % (ref 20–55)
MCH RBC QN AUTO: 30.1 PG (ref 27–34)
MCHC RBC AUTO-ENTMCNC: 33 G/DL (ref 31–37)
MCV RBC AUTO: 91.1 FL (ref 79–99)
MONOCYTES # BLD AUTO: 0.74 X10(3)/MCL (ref 0.24–0.36)
MONOCYTES NFR BLD AUTO: 12.7 % (ref 4.7–12.5)
NEUTROPHILS # BLD AUTO: 2.41 X10(3)/MCL (ref 1.56–6.13)
NEUTROPHILS NFR BLD AUTO: 41.5 % (ref 37–73)
NITRITE UR QL STRIP: NEGATIVE
NRBC BLD AUTO-RTO: 0 %
PH UR STRIP: 6 [PH]
PLATELET # BLD AUTO: 352 X10(3)/MCL (ref 140–371)
PMV BLD AUTO: 10.9 FL (ref 9.4–12.4)
POTASSIUM SERPL-SCNC: 4.5 MMOL/L (ref 3.5–5.1)
PROT SERPL-MCNC: 7.5 GM/DL (ref 6.3–8.2)
PROT UR QL STRIP: NEGATIVE
PROT UR STRIP-MCNC: 6 MG/DL
RBC # BLD AUTO: 4.72 X10(6)/MCL (ref 4–5.1)
SODIUM SERPL-SCNC: 138 MMOL/L (ref 136–145)
SP GR UR STRIP.AUTO: >=1.03 (ref 1–1.03)
URINE PROTEIN/CREATININE RATIO (OLG): 0
UROBILINOGEN UR STRIP-ACNC: 0.2
WBC # BLD AUTO: 5.82 X10(3)/MCL (ref 4–11.5)

## 2024-07-09 PROCEDURE — 82570 ASSAY OF URINE CREATININE: CPT | Mod: TXP

## 2024-07-09 PROCEDURE — 80053 COMPREHEN METABOLIC PANEL: CPT | Mod: TXP

## 2024-07-09 PROCEDURE — 36415 COLL VENOUS BLD VENIPUNCTURE: CPT | Mod: TXP

## 2024-07-09 PROCEDURE — 81003 URINALYSIS AUTO W/O SCOPE: CPT | Mod: TXP

## 2024-07-09 PROCEDURE — 86140 C-REACTIVE PROTEIN: CPT | Mod: TXP

## 2024-07-09 PROCEDURE — 86225 DNA ANTIBODY NATIVE: CPT | Mod: TXP

## 2024-07-09 PROCEDURE — 86160 COMPLEMENT ANTIGEN: CPT | Mod: NTX

## 2024-07-09 PROCEDURE — 85652 RBC SED RATE AUTOMATED: CPT | Mod: TXP

## 2024-07-09 PROCEDURE — 85025 COMPLETE CBC W/AUTO DIFF WBC: CPT | Mod: TXP

## 2024-07-11 LAB — DSDNA AB QUANT (OHS): 5 IU/ML

## 2024-07-16 ENCOUNTER — TELEPHONE (OUTPATIENT)
Dept: TRANSPLANT | Facility: CLINIC | Age: 59
End: 2024-07-16
Payer: MEDICARE

## 2024-07-17 ENCOUNTER — HOSPITAL ENCOUNTER (OUTPATIENT)
Dept: RADIOLOGY | Facility: HOSPITAL | Age: 59
Discharge: HOME | End: 2024-07-17
Attending: NURSE PRACTITIONER
Payer: MEDICARE

## 2024-07-17 ENCOUNTER — OFFICE VISIT (OUTPATIENT)
Dept: RHEUMATOLOGY | Facility: CLINIC | Age: 59
End: 2024-07-17
Payer: MEDICARE

## 2024-07-17 ENCOUNTER — HOSPITAL ENCOUNTER (OUTPATIENT)
Dept: PULMONOLOGY | Facility: CLINIC | Age: 59
Discharge: HOME | End: 2024-07-17
Payer: MEDICARE

## 2024-07-17 ENCOUNTER — OFFICE VISIT (OUTPATIENT)
Dept: TRANSPLANT | Facility: CLINIC | Age: 59
End: 2024-07-17
Payer: MEDICARE

## 2024-07-17 VITALS
HEIGHT: 66 IN | HEIGHT: 66 IN | BODY MASS INDEX: 25.15 KG/M2 | HEART RATE: 63 BPM | RESPIRATION RATE: 16 BRPM | WEIGHT: 156.5 LBS | DIASTOLIC BLOOD PRESSURE: 104 MMHG | SYSTOLIC BLOOD PRESSURE: 175 MMHG | WEIGHT: 156.5 LBS | BODY MASS INDEX: 25.15 KG/M2 | TEMPERATURE: 98 F

## 2024-07-17 VITALS
BODY MASS INDEX: 25.15 KG/M2 | HEIGHT: 66 IN | SYSTOLIC BLOOD PRESSURE: 142 MMHG | HEART RATE: 74 BPM | DIASTOLIC BLOOD PRESSURE: 93 MMHG | WEIGHT: 156.5 LBS

## 2024-07-17 DIAGNOSIS — J84.9 ILD (INTERSTITIAL LUNG DISEASE): ICD-10-CM

## 2024-07-17 DIAGNOSIS — I73.01 RAYNAUD'S DISEASE WITH GANGRENE: ICD-10-CM

## 2024-07-17 DIAGNOSIS — M32.9 SYSTEMIC LUPUS ERYTHEMATOSUS, UNSPECIFIED SLE TYPE, UNSPECIFIED ORGAN INVOLVEMENT STATUS: Primary | ICD-10-CM

## 2024-07-17 DIAGNOSIS — J84.9 INTERSTITIAL PULMONARY DISEASE, UNSPECIFIED: ICD-10-CM

## 2024-07-17 DIAGNOSIS — D84.821 IMMUNODEFICIENCY DUE TO DRUG THERAPY: ICD-10-CM

## 2024-07-17 DIAGNOSIS — J30.9 ALLERGIC RHINITIS, UNSPECIFIED SEASONALITY, UNSPECIFIED TRIGGER: ICD-10-CM

## 2024-07-17 DIAGNOSIS — Z79.899 IMMUNODEFICIENCY DUE TO DRUG THERAPY: ICD-10-CM

## 2024-07-17 DIAGNOSIS — M32.19 OTHER SYSTEMIC LUPUS ERYTHEMATOSUS WITH OTHER ORGAN INVOLVEMENT: ICD-10-CM

## 2024-07-17 DIAGNOSIS — M32.9 SYSTEMIC LUPUS ERYTHEMATOSUS, UNSPECIFIED SLE TYPE, UNSPECIFIED ORGAN INVOLVEMENT STATUS: ICD-10-CM

## 2024-07-17 DIAGNOSIS — J84.9 ILD (INTERSTITIAL LUNG DISEASE): Primary | ICD-10-CM

## 2024-07-17 DIAGNOSIS — R25.1 TREMOR: ICD-10-CM

## 2024-07-17 LAB
DLCO ADJ PRE: 14.5 ML/(MIN*MMHG) (ref 18.69–30.16)
DLCO SINGLE BREATH LLN: 18.69
DLCO SINGLE BREATH PRE REF: 60.8 %
DLCO SINGLE BREATH REF: 24.43
DLCOC SBVA LLN: 3.24
DLCOC SBVA PRE REF: 89 %
DLCOC SBVA REF: 4.63
DLCOC SINGLE BREATH LLN: 18.69
DLCOC SINGLE BREATH PRE REF: 59.4 %
DLCOC SINGLE BREATH REF: 24.43
DLCOCSBVAULN: 6.02
DLCOCSINGLEBREATHULN: 30.16
DLCOCSINGLEBREATHZSCORE: -2.85
DLCOSINGLEBREATHULN: 30.16
DLCOSINGLEBREATHZSCORE: -2.75
DLCOVA LLN: 3.24
DLCOVA PRE REF: 91.1 %
DLCOVA PRE: 4.22 ML/(MIN*MMHG*L) (ref 3.24–6.02)
DLCOVA REF: 4.63
DLCOVAULN: 6.02
DLVAADJ PRE: 4.12 ML/(MIN*MMHG*L) (ref 3.24–6.02)
ERV LLN: -16449.13
ERV PRE REF: 80.8 %
ERV REF: 0.87
ERVULN: ABNORMAL
FEF 25 75 LLN: 1.68
FEF 25 75 PRE REF: 35.5 %
FEF 25 75 REF: 3.08
FEV05 LLN: 1.13
FEV05 REF: 1.98
FEV1 FVC LLN: 68
FEV1 FVC PRE REF: 89.4 %
FEV1 FVC REF: 80
FEV1 LLN: 1.75
FEV1 PRE REF: 74.3 %
FEV1 REF: 2.37
FEV1FVCZSCORE: -1.26
FEV1ZSCORE: -1.61
FRCPLETH LLN: 1.99
FRCPLETH PREREF: 77.7 %
FRCPLETH REF: 2.81
FRCPLETHULN: 3.63
FVC LLN: 2.23
FVC PRE REF: 82.7 %
FVC REF: 2.98
FVCZSCORE: -1.11
IVC PRE: 2.42 L (ref 2.23–3.78)
IVC SINGLE BREATH LLN: 2.23
IVC SINGLE BREATH PRE REF: 81.2 %
IVC SINGLE BREATH REF: 2.98
IVCSINGLEBREATHULN: 3.78
LLN IC: -16447.7
PEF LLN: 4
PEF PRE REF: 81.2 %
PEF REF: 6.13
PHYSICIAN COMMENT: ABNORMAL
PRE DLCO: 14.84 ML/(MIN*MMHG) (ref 18.69–30.16)
PRE ERV: 0.7 L (ref -16449.13–16450.87)
PRE FEF 25 75: 1.09 L/S (ref 1.68–4.48)
PRE FET 100: 6.76 SEC
PRE FEV05 REF: 66.4 %
PRE FEV1 FVC: 71.34 % (ref 68.47–89.53)
PRE FEV1: 1.76 L (ref 1.75–2.97)
PRE FEV5: 1.32 L (ref 1.13–2.84)
PRE FRC PL: 2.18 L (ref 1.99–3.63)
PRE FVC: 2.47 L (ref 2.23–3.78)
PRE IC: 1.77 L (ref -16447.7–16452.3)
PRE PEF: 4.98 L/S (ref 4–8.26)
PRE REF IC: 76.9 %
PRE RV: 1.49 L (ref 1.37–2.52)
PRE TLC: 3.95 L (ref 4.29–6.26)
RAW PRE REF: 97.3 %
RAW PRE: 2.98 CMH2O*S/L (ref 3.06–3.06)
RAW REF: 3.06
REF IC: 2.3
RV LLN: 1.37
RV PRE REF: 76.3 %
RV REF: 1.95
RVTLC LLN: 29
RVTLC PRE REF: 98 %
RVTLC PRE: 37.59 % (ref 28.75–47.93)
RVTLC REF: 38
RVTLCULN: 48
RVULN: 2.52
SGAW PRE REF: 128.4 %
SGAW PRE: 0.13 1/(CMH2O*S) (ref 0.1–0.1)
SGAW REF: 0.1
TLC LLN: 4.29
TLC PRE REF: 74.9 %
TLC REF: 5.27
TLC ULN: 6.26
TLCZSCORE: -2.2
ULN IC: ABNORMAL
VA PRE: 3.52 L (ref 5.12–5.12)
VA SINGLE BREATH LLN: 5.12
VA SINGLE BREATH PRE REF: 68.7 %
VA SINGLE BREATH REF: 5.12
VASINGLEBREATHULN: 5.12
VC LLN: 2.23
VC PRE REF: 82.7 %
VC PRE: 2.47 L (ref 2.23–3.78)
VC REF: 2.98
VC ULN: 3.78

## 2024-07-17 PROCEDURE — 3008F BODY MASS INDEX DOCD: CPT | Mod: CPTII,NTX,S$GLB, | Performed by: PHYSICIAN ASSISTANT

## 2024-07-17 PROCEDURE — 3080F DIAST BP >= 90 MM HG: CPT | Mod: CPTII,NTX,S$GLB, | Performed by: INTERNAL MEDICINE

## 2024-07-17 PROCEDURE — 1159F MED LIST DOCD IN RCRD: CPT | Mod: CPTII,NTX,S$GLB, | Performed by: PHYSICIAN ASSISTANT

## 2024-07-17 PROCEDURE — 94010 BREATHING CAPACITY TEST: CPT | Mod: 59,NTX,S$GLB, | Performed by: INTERNAL MEDICINE

## 2024-07-17 PROCEDURE — 3077F SYST BP >= 140 MM HG: CPT | Mod: CPTII,NTX,S$GLB, | Performed by: INTERNAL MEDICINE

## 2024-07-17 PROCEDURE — 94618 PULMONARY STRESS TESTING: CPT | Mod: NTX,S$GLB,, | Performed by: INTERNAL MEDICINE

## 2024-07-17 PROCEDURE — 1160F RVW MEDS BY RX/DR IN RCRD: CPT | Mod: CPTII,NTX,S$GLB, | Performed by: PHYSICIAN ASSISTANT

## 2024-07-17 PROCEDURE — 3077F SYST BP >= 140 MM HG: CPT | Mod: CPTII,NTX,S$GLB, | Performed by: PHYSICIAN ASSISTANT

## 2024-07-17 PROCEDURE — 99999 PR PBB SHADOW E&M-EST. PATIENT-LVL IV: CPT | Mod: PBBFAC,TXP,, | Performed by: PHYSICIAN ASSISTANT

## 2024-07-17 PROCEDURE — 99214 OFFICE O/P EST MOD 30 MIN: CPT | Mod: 25,NTX,S$GLB, | Performed by: PHYSICIAN ASSISTANT

## 2024-07-17 PROCEDURE — 99214 OFFICE O/P EST MOD 30 MIN: CPT | Mod: NTX,S$GLB,, | Performed by: INTERNAL MEDICINE

## 2024-07-17 PROCEDURE — 3080F DIAST BP >= 90 MM HG: CPT | Mod: CPTII,NTX,S$GLB, | Performed by: PHYSICIAN ASSISTANT

## 2024-07-17 PROCEDURE — 94729 DIFFUSING CAPACITY: CPT | Mod: NTX,S$GLB,, | Performed by: INTERNAL MEDICINE

## 2024-07-17 PROCEDURE — 94726 PLETHYSMOGRAPHY LUNG VOLUMES: CPT | Mod: NTX,S$GLB,, | Performed by: INTERNAL MEDICINE

## 2024-07-17 PROCEDURE — 3008F BODY MASS INDEX DOCD: CPT | Mod: CPTII,NTX,S$GLB, | Performed by: INTERNAL MEDICINE

## 2024-07-17 PROCEDURE — 71250 CT THORAX DX C-: CPT | Mod: 26,NTX,, | Performed by: RADIOLOGY

## 2024-07-17 PROCEDURE — 71250 CT THORAX DX C-: CPT | Mod: TC,NTX

## 2024-07-17 PROCEDURE — 99999 PR PBB SHADOW E&M-EST. PATIENT-LVL III: CPT | Mod: PBBFAC,TXP,, | Performed by: INTERNAL MEDICINE

## 2024-07-17 RX ORDER — PILOCARPINE HYDROCHLORIDE 5 MG/1
5 TABLET, FILM COATED ORAL 2 TIMES DAILY
Qty: 60 TABLET | Refills: 11 | Status: SHIPPED | OUTPATIENT
Start: 2024-07-17 | End: 2025-07-17

## 2024-07-17 RX ORDER — MYCOPHENOLATE MOFETIL 500 MG/1
1500 TABLET ORAL 2 TIMES DAILY
Qty: 180 TABLET | Refills: 4 | Status: SHIPPED | OUTPATIENT
Start: 2024-07-17

## 2024-07-17 RX ORDER — NIFEDIPINE 90 MG/1
90 TABLET, EXTENDED RELEASE ORAL DAILY
Qty: 30 TABLET | Refills: 11 | Status: SHIPPED | OUTPATIENT
Start: 2024-07-17 | End: 2025-07-17

## 2024-07-17 RX ORDER — SILDENAFIL CITRATE 20 MG/1
20 TABLET ORAL 3 TIMES DAILY
Qty: 270 TABLET | Refills: 4 | Status: SHIPPED | OUTPATIENT
Start: 2024-07-17 | End: 2025-07-17

## 2024-07-17 RX ORDER — HYDROXYCHLOROQUINE SULFATE 200 MG/1
200 TABLET, FILM COATED ORAL 2 TIMES DAILY
Qty: 180 TABLET | Refills: 4 | Status: SHIPPED | OUTPATIENT
Start: 2024-07-17

## 2024-07-17 NOTE — PROGRESS NOTES
Subjective:       Patient ID: Christen Bowman is a 55 y.o. female.    Chief Complaint: No chief complaint on file.    HPI 55 year old F with PMH of HTN, hypothyroidism, SLE, RA here to establish care. She was diagnosed with SLE in 1996  when she presented with +KENNY, arthritis , rash, raynauds,pleurisy (pericarditis),  and thrombocytopenia.  She reports she came in to hospital due to chest pain and pain/swelling in hands and elbows.  She was told to have pericardial effusion.   She was hospitalized for thrombocytopenia around age 22 and was treated with high dose steroids and Iv IG/plasmapheresis. She required splenectomy in   Due to thrombocytopenia.  She had one first trimester pregnancy loss. She was initially put on plaquenil when she was diagnosed but it was stopped at some point.        About a year ago (1/2020), she developed aphasia/confusion and had CVA. She was treated with high doses of steroids and Iv IG but no anticoagulation.  Since the stroke, she is more forgetful.        She as recently hospitalized in outside hospital for arthritis, raynauds causing ulcerations with digital ischemia and bacteremia.   It appears on December 12,2020, she developed URI symptoms (covid test-negative). She was having sinus drainage. She went to urgent care.  She went home and the next day,she noticed raynauds in hands and feet.  Within a matter of hours, she developed the blisters in lower extremities.   She was noted to have platelets of 5000 and creat 1.6 (baseline 0.64). White count had increased to 30 thousand. During admission, she developed severe ulcerations in her lower legs and toes and gangrene developed in toes. She was treated with high doses of steroids. She was also treated with antibiotics for gram positive cocci in blood cultures. Apparently, she also had bone marrow biopsy which did not show any significant changes and  BALDO did not show endocarditis.  During hospitalization, patient developed worsening  ulcerations and gangrene of multiple toes. She had lower extremity US which did not show DVT and was not put on anticoagulation. She was discharged on prednisone 80mg daily, mtx 10mg po week, diltiazem 360mg ER, nystatin, tylenol-3, estradiol and HCTZ 25mg poqday.        She is on clindamycin 300mg po q8 hours , prednisone 10mg a day, MTX 4 pills once a week, folic acid, oxycodone 10/325mg po qhs. She saw Dr. Yash Santana about 2 weeks ago. At that time, she was put on clindamycin. She saw orthopedic who is considering amputation.  Denies fevers, pleurisy, sob, alopecia. Reports raynauds in hands and feet.    Interval history: Denies pleurisy. She has not been in the hospital. She is off prednisone.  She is taking cellcept 1500mg po BID.She is on sildenafil TID. She is taking nifedipene  60mg po qhs..She is taking plaquenil 200mg po BID. Denies any rashes, oral ulcers, hair loss, joint pain or swelling.    Past Medical History:   Diagnosis Date    Raynaud's disease     Rheumatoid arteritis     Systemic lupus erythematosus    ,  Review of Systems   Constitutional: Negative for activity change, appetite change, chills, diaphoresis, fatigue and fever.   HENT: Negative for congestion, ear discharge, ear pain, facial swelling, mouth sores, sinus pressure, sneezing, sore throat, tinnitus and trouble swallowing.    Eyes: Negative for photophobia, pain, discharge, redness, itching and visual disturbance.   Respiratory: Negative for apnea, chest tightness, shortness of breath, wheezing and stridor.    Cardiovascular: Negative for leg swelling.   Gastrointestinal: Negative for abdominal distention, abdominal pain, anal bleeding, blood in stool, constipation, diarrhea and nausea.   Endocrine: Negative for cold intolerance and heat intolerance.   Genitourinary: Negative for difficulty urinating and dysuria.   Musculoskeletal: Negative for arthralgias, back pain, gait problem, joint swelling, myalgias, neck pain and neck  stiffness.   Skin: Negative for color change, pallor, rash and wound.   Neurological: Negative for dizziness, seizures, light-headedness and numbness.   Hematological: Negative for adenopathy. Does not bruise/bleed easily.   Psychiatric/Behavioral: Negative for sleep disturbance. The patient is not nervous/anxious.              Objective:   There were no vitals taken for this visit.   BP: 142/98  Physical Exam   Constitutional: She is oriented to person, place, and time.   HENT:   Head: Normocephalic and atraumatic.   Right Ear: External ear normal.   Left Ear: External ear normal.   Nose: Nose normal.   Mouth/Throat: Oropharynx is clear and moist. No oropharyngeal exudate.   Eyes: Conjunctivae and EOM are normal. Pupils are equal, round, and reactive to light. Right eye exhibits no discharge. Left eye exhibits no discharge. No scleral icterus.   Neck: No JVD present. No thyromegaly present.   Cardiovascular: Normal rate, regular rhythm, normal heart sounds and intact distal pulses.  Exam reveals no gallop and no friction rub.    No murmur heard.  Pulmonary/Chest: Effort normal and breath sounds normal. No respiratory distress. She has no wheezes. She has no rales. She exhibits no tenderness.   Abdominal: Soft. Bowel sounds are normal. She exhibits no distension and no mass. There is no abdominal tenderness. There is no rebound and no guarding.   Lymphadenopathy:     She has no cervical adenopathy.   Neurological: She is alert and oriented to person, place, and time. No cranial nerve deficit. Gait normal. Coordination normal.   Skin: Skin is dry. No rash noted. No erythema. No pallor.     Psychiatric: Affect and judgment normal.   Musculoskeletal: Tenderness and edema present. No deformity.      Comments: Gangrenous digits in left foot;   Dark Discoloration in second through fourth toes on right    Lower shins wrapped      labs: 3/3/2021  Wbc-12.6  Hematocrit-37  Plate-389  Labs 2/3/21  [id number]  No data to display      Assessment:     59 year old F with PMH of HTN, hypothyroidism, SLE, RA here for follow up of SLE. She was diagnosed with SLE in 1996  when she presented with +KENNY,+dsdna,  arthritis , rash, raynauds,pleurisy (pericarditis),  and thrombocytopenia. She was recently hospitalized for severe raynauds leading to gangrene in right foot and is s/p partial amputation 3/30/2021.  During that hospitalization, she was noted to have  abnormal CT chest concerning for ILD.    Given her history of CVA, first trimester pregnancy loss, and progressive gangrene, patient was admitted for anticoagulation for suspected APS but APS work up was negative.  She was admitted from 2/5 to 2/11/21 for  epoprostenol for 5 days and pulse dose steroids.     Her main issue now is episodes of shortness of breath, dizziness, and palpitations.  I emailed her cardiologist and will also get echo.  She has appt with pulmonary today.    SLE:  +KENNY,+dsdna,  arthritis , rash, raynauds,pleurisy (pericarditis), thrombocytopenia, severe raynauds requiring amputation.  #raynauds: doing much better.  Continue nifedipine 60mg poq day (takes 30mg tablets)  Continue sildenafil 20mg po TID  Continue cellcept 1500mg po BID  Continue plaquenil twice a day  ( eye exam  done in 9/2023-clear)  Continue aspirin 81 mg po qday  Consider benylsta injections if needed.  LABS  before next visit     #ILD: noted on CT chest. Denies symptoms  -cellcept as above  -following in ILD clinic    40 * minutes of total time spent on the encounter, which includes face to face time and non-face to face time preparing to see the patient (eg, review of tests), Obtaining and/or reviewing separately obtained history, Documenting clinical information in the electronic or other health record, Independently interpreting results (not separately reported) and communicating results to the patient/family/caregiver, or Care coordination (not separately reported).

## 2024-07-17 NOTE — LETTER
July 17, 2024        Laurel Johnson  [address]  [address]  [place] [address]  Phone: [phone]  Fax: [phone]             [facility] 1st Fl  [address]  [facility] [address]  Phone: [phone]   Patient: Christen Bowman   MR Number: [id number]   YOB: 1965   Date of Visit: 7/17/2024       Dear Dr. Laurel Johnson    Thank you for referring Christen Bowman to me for evaluation. Attached you will find relevant portions of my assessment and plan of care.    If you have questions, please do not hesitate to call me. I look forward to following Christen Bowman along with you.    Sincerely,    FRANCHESCA Ballardosure    If you would like to receive this communication electronically, please contact [email or web address] or [phone] to request [employer] access.    [employer] is a tool which provides read-only access to select patient information with whom you have a relationship. Its easy to use and provides real time access to review your patients record including encounter summaries, notes, results, and demographic information.    If you feel you have received this communication in error or would no longer like to receive these types of communications, please e-mail [email or web address]

## 2024-07-17 NOTE — PROGRESS NOTES
ADVANCED LUNG DISEASE CLINIC FOLLOW UP VISIT                                                                                                                                              Reason for Visit:  SLE with lung involvement, ILD    Referring Physician: No ref. provider found    History of Present Illness: Christen Bowman is a 59 y.o. female who is on 0L of oxygen.  She is on no assisted ventilation.  Her New York Heart Association Class is I and a Karnofsky score of 90% - Able to carry on normal activity: minor symptoms of disease. She is not diabetic.     Followed for SLE-ILD. Currently on MMF and plaquenil. Takes sildenafil and nifedipine for Raynauds. Denies dyspnea, cough, or fevers. Remains very active and takes all medications as directed.     Patient reports intermittent episodes of palpitations and occasional shortness of breath when at rest. Follows with her cardiologist and rheumatologist. Denies recent exacerbations/hospitalizations. Overall doing very well.             Past Medical History:   Diagnosis Date    Essential hypertension 12/23/2019 2:02:07 PM    [facility] Historical - Cardiovascular: Hypertension-No Additional Notes    Essential hypertension 12/23/2019 2:02:07 PM    [facility] Historical - Cardiovascular: Hypertension-No Additional Notes    Raynaud's disease     Rheumatoid arteritis     Systemic lupus erythematosus        Past Surgical History:   Procedure Laterality Date    SPLENECTOMY      TONSILLECTOMY      TUBAL LIGATION         Allergies: Patient has no known allergies.    Current Outpatient Medications   Medication Sig    aspirin (ECOTRIN) 81 MG EC tablet Take 81 mg by mouth once daily.    azelastine (ASTELIN) 137 mcg (0.1 %) nasal spray 1 spray (137 mcg total) by Nasal route 2 (two) times daily.    diclofenac sodium (VOLTAREN) 1 % Gel Apply topically 4 (four) times daily.    estradioL (ESTRACE) 1 MG tablet TAKE ONE TABLET BY MOUTH EVERY DAY    hydroxychloroquine  (PLAQUENIL) 200 mg tablet Take 1 tablet (200 mg total) by mouth 2 (two) times daily.    levothyroxine (SYNTHROID) 50 MCG tablet Take 1 tablet (50 mcg total) by mouth every morning.    mycophenolate (CELLCEPT) 500 mg Tab Take 3 tablets (1,500 mg total) by mouth 2 (two) times daily.    NIFEdipine (ADALAT CC) 30 MG TbSR Take 2 tablets (60 mg total) by mouth once daily.    NIFEdipine (PROCARDIA-XL) 90 MG (OSM) 24 hr tablet Take 1 tablet (90 mg total) by mouth once daily.    pilocarpine (SALAGEN) 5 MG Tab Take 1 tablet (5 mg total) by mouth 2 (two) times daily.    pregabalin (LYRICA ORAL) Take 150 mg by mouth 2 (two) times a day.    primidone (MYSOLINE) 50 MG Tab Take 50 mg by mouth every evening.    progesterone (PROMETRIUM) 200 MG capsule TAKE ONE CAPSULE BY MOUTH EVERY DAY    sildenafil (REVATIO) 20 mg Tab Take 1 tablet (20 mg total) by mouth 3 (three) times daily.     No current facility-administered medications for this visit.       Immunization History   Administered Date(s) Administered    COVID-19, MRNA, LN-S, PF (Pfizer) (Gray Cap) 03/21/2022    COVID-19, MRNA, LN-S, PF (Pfizer) (Purple Cap) 08/19/2021, 09/09/2021    Influenza - Quadrivalent - PF *Preferred* (6 months and older) 09/22/2013, 10/17/2014, 02/11/2021    Pneumococcal Polysaccharide - 23 Valent 10/17/2014     Family History:    Family History   Problem Relation Name Age of Onset    Hypertension Mother      Lupus Mother      Cancer Mother      Hypertension Father      Anuerysm Father       Social History     Substance and Sexual Activity   Alcohol Use Not Currently      Social History     Substance and Sexual Activity   Drug Use Not Currently    Types: Marijuana    Comment: prescribed uses once or twice a month edibles. for her hip pain      Social History     Socioeconomic History    Marital status:    Tobacco Use    Smoking status: Never     Passive exposure: Never    Smokeless tobacco: Never   Substance and Sexual Activity    Alcohol use: Not  Currently    Drug use: Not Currently     Types: Marijuana     Comment: prescribed uses once or twice a month edibles. for her hip pain     Social Determinants of Health     Financial Resource Strain: High Risk (1/17/2024)    Overall Financial Resource Strain (CARDIA)     Difficulty of Paying Living Expenses: Hard   Food Insecurity: Food Insecurity Present (1/17/2024)    Hunger Vital Sign     Worried About Running Out of Food in the Last Year: Often true     Ran Out of Food in the Last Year: Sometimes true   Transportation Needs: No Transportation Needs (1/17/2024)    PRAPARE - Transportation     Lack of Transportation (Medical): No     Lack of Transportation (Non-Medical): No   Physical Activity: Insufficiently Active (1/17/2024)    Exercise Vital Sign     Days of Exercise per Week: 2 days     Minutes of Exercise per Session: 30 min   Stress: Stress Concern Present (1/17/2024)    [employer] - Occupational Stress Questionnaire     Feeling of Stress : To some extent   Housing Stability: Unknown (1/17/2024)    Housing Stability Vital Sign     Unable to Pay for Housing in the Last Year: Patient declined     Number of Places Lived in the Last Year: 1     Unstable Housing in the Last Year: No     Review of Systems   Constitutional: Negative.  Negative for chills, diaphoresis, fever, malaise/fatigue and weight loss.   HENT:  Negative for congestion, ear pain, nosebleeds and sinus pain.    Eyes:  Negative for blurred vision, photophobia and pain.   Respiratory:  Negative for cough, hemoptysis, sputum production, shortness of breath and wheezing.    Cardiovascular:  Negative for chest pain, palpitations, claudication and leg swelling.   Gastrointestinal:  Negative for constipation, diarrhea, heartburn, nausea and vomiting.   Genitourinary:  Negative for frequency and urgency.   Musculoskeletal:  Positive for joint pain (chronic hip pain). Negative for falls and myalgias.   Skin:  Negative for  rash.   Neurological:  Positive for tremors. Negative for dizziness, focal weakness, weakness and headaches.   Endo/Heme/Allergies:  Negative for environmental allergies and polydipsia. Does not bruise/bleed easily.   Psychiatric/Behavioral:  Negative for depression. The patient is not nervous/anxious.      Vitals  There were no vitals taken for this visit.  Physical Exam  Vitals and nursing note reviewed.   Constitutional:       General: She is not in acute distress.     Appearance: Normal appearance. She is well-developed. She is not diaphoretic.   HENT:      Head: Normocephalic and atraumatic.   Eyes:      General: No scleral icterus.     Extraocular Movements: Extraocular movements intact.      Conjunctiva/sclera: Conjunctivae normal.   Neck:      Thyroid: No thyromegaly.      Vascular: No JVD.      Trachea: No tracheal deviation.   Cardiovascular:      Rate and Rhythm: Normal rate and regular rhythm.      Heart sounds: Normal heart sounds. No murmur heard.     No friction rub. No gallop.   Pulmonary:      Effort: Pulmonary effort is normal. No respiratory distress.      Breath sounds: Normal breath sounds. No stridor. No wheezing, rhonchi or rales.   Chest:      Chest wall: No tenderness.   Abdominal:      General: Bowel sounds are normal. There is no distension.      Palpations: Abdomen is soft.      Tenderness: There is no abdominal tenderness.   Musculoskeletal:         General: No deformity. Normal range of motion.      Cervical back: Normal range of motion and neck supple.   Skin:     General: Skin is warm and dry.      Capillary Refill: Capillary refill takes less than 2 seconds.      Coloration: Skin is not pale.      Findings: No erythema or rash.   Neurological:      Mental Status: She is alert and oriented to person, place, and time.      Cranial Nerves: No cranial nerve deficit.   Psychiatric:         Mood and Affect: Mood normal.         Judgment: Judgment normal.         Labs:          7/17/2024     11:32 AM 4/30/2024     9:48 AM 1/19/2024    10:07 AM 5/23/2023    11:05 AM 11/10/2022     8:32 AM 5/5/2022    11:00 AM   Pulmonary Function Tests   FVC 2.47 liters 2.62 liters 2.55 liters 2.55 liters 2.57 liters 2.63 liters   FEV1 1.76 liters 1.83 liters 1.75 liters 1.85 liters 1.86 liters 1.93 liters   TLC (liters) 3.95 liters 3.71 liters 4.1 liters 3.77 liters 3.87 liters 4.12 liters   DLCO (ml/mmHg sec) 14.84 ml/mmHg sec 16.57 ml/mmHg sec 13.54 ml/mmHg sec 15.74 ml/mmHg sec 15.52 ml/mmHg sec 15.5 ml/mmHg sec   FVC% 82.7 87.6 85 84.5 85 86.5   FEV1% 74.3 77.2 73.7 77 77 79.8   FEF 25-75 1.09 1.15 1.1 1.28 1.32 1.26   FEF 25-75% 35.5 52 49.2 56.9 58 54.6   TLC% 74.9 70.4 77.8 71.5 74 78.2   RV 1.49 1.09 1.56 1.23 1.3 1.49   RV% 76.3 56.2 80.6 63.5 68 77.9   DLCO% 60.8 67.9 55.1 64 62 15.5         7/17/2024    11:17 AM 11/10/2022     7:58 AM 5/5/2022    10:54 AM   6MW   6MWT Status completed without stopping completed without stopping completed without stopping   Patient Reported Leg pain Lightheadedness;Leg pain Leg pain   Was O2 used? No No No   6MW Distance walked (feet) 1800 feet 1732 feet 1600 feet   Distance walked (meters) 548.64 meters 527.91 meters 487.68 meters   Did patient stop? No No No   Oxygen Saturation 99 % 100 % 98 %   Supplemental Oxygen Room Air Room Air Room Air   Heart Rate 63 bpm 86 bpm 79 bpm   Blood Pressure 156/99 154/87 141/85   Dann Dyspnea Rating  nothing at all nothing at all nothing at all   Oxygen Saturation 95 % 97 % 98 %   Supplemental Oxygen Room Air Room Air Room Air   Heart Rate 64 bpm 130 bpm 108 bpm   Blood Pressure 185/105 183/93 192/96   Dann Dyspnea Rating  light moderate moderate   Recovery Time (seconds) 150 seconds 192 seconds 197 seconds   Oxygen Saturation 98 % 100 % 99 %   Supplemental Oxygen Room Air Room Air Room Air   Heart Rate 68 bpm 94 bpm 83 bpm       Imaging:  Results for orders placed during the hospital encounter of 04/25/23    X-Ray Chest PA And  Lateral    Narrative  EXAMINATION:  XR CHEST PA AND LATERAL    CLINICAL HISTORY:  r/o rheumatoid nodules; Chest pain, unspecified    TECHNIQUE:  PA and lateral views of the chest were performed.    COMPARISON:  02/06/2021    FINDINGS:  Cardiac size is normal.  Lungs are clear no infiltrate or pulmonary nodules can be seen few fibrotic streaks are noted but no pleural effusion is noted.    Impression  See above      Electronically signed by: Stanley Monge MD  Date:    04/25/2023  Time:    10:43    Time:    13:05    Results for orders placed during the hospital encounter of 02/05/21    CT Chest Without Contrast    Narrative  EXAMINATION:  CT CHEST WITHOUT CONTRAST    CLINICAL HISTORY:  HRCT please. CT with Mosaic attenuation, left sided reticular opacification with fibrotic appearance and thin-walled cystic changes.  Additionally there are subcentimeter nodular opacities;    TECHNIQUE:  Low dose axial images, sagittal and coronal reformations were obtained from the thoracic inlet to the lung bases. Contrast was not administered.    X-ray dose: DLP= 161.76 mGy-cm    COMPARISON:  CTA abdomen pelvis 02/08/2021    Chest radiograph 02/06/2021    FINDINGS:  Base of Neck:  No significant abnormality.    Thoracic soft tissues:  No significant abnormality.    Aorta: Left-sided aortic arch with normal caliber, contour, and course with no significant atherosclerotic calcifications.  Common origin of the brachiocephalic and left common carotid arteries.    Heart: Normal size.  No significant coronary artery calcific atherosclerosis.    Pericardium: No pericardial fluid or pericardial calcification.    Pulmonary vasculature: Pulmonary arteries distribute normally. There are four pulmonary veins that return to the left atrium.    Xenia/Mediastinum:  No lymph node enlargement on this noncontrast CT. Hilar contours are unremarkable.    Airways:  Trachea is midline and proximal airways are patent without significant  abnormality.    Pleura: No pleural thickening, pleural calcification, or pleural fluid.  No pneumothorax.    Lungs:    -Scattered bilateral pulmonary nodules, with the largest on the right a pleural based nodule in the apical segment of the right upper lobe measuring 0.6 cm (axial series 4, image 117) and the largest on the left in the anteromedial basal segment of the left lower lobe measuring 0.3 cm (axial series 4, image 295).    -Subpleural reticular opacities and traction bronchiectasis, most prominent in the basilar segments of the left lower lobe and to a lesser extent the basilar segments of the right lower lobe.  Honeycombing is present in the left lower lobe.    -Subcentimeter densely calcified granuloma in the posterior basal segment of the left lower lobe reflects remote infection.    Esophagus: Normal in course and caliber.    Upper abdomen: No acute intra-abdominal abnormality.  Spleen is surgically absent.    Bones:  Degenerative changes of the visualized osseous structures without acute fracture or lytic or sclerotic lesions.    Impression  1. There are subpleural reticular opacities and traction bronchiectasis, most prominent in the basilar segments of the left lower lobe and to a lesser extent in the basilar segments of the right lower lobe.  Honeycombing is present in the left lower lobe.  Findings suggest interstitial lung disease, likely UIP versus NSIP.  2. Scattered bilateral pulmonary nodules measuring up to 0.6 cm.  For multiple solid nodules with any 6 mm or greater, Fleischner Society guidelines recommend follow up with non-contrast chest CT at 3-6 months (doing 05/10/2021 and 08/10/2021) and 18-24 months (between 08/10/2022 and 02/10/2023) after discovery.  3. Subcentimeter densely calcified granuloma in the posterior basal segment of the left lower lobe reflects remote infection.    Electronically signed by resident: Celso Butler  Date:    02/10/2021  Time:    11:20    Electronically  signed by: Yoselyn Vasquez MD  Date:    02/10/2021  Time:    11:41      CT SCAN OF CHEST WITHOUT CONTRAST:     CPT: 49675     Total DLP: 181.8 mGy-cm; Automatic exposure control was utilized to limit the radiation dose to the patient.     HISTORY:Interstitial lung disease; interstitial pulmonary disease, unspecified tight.     Comparison: 02/10/2021 at [facility].     Technique: Multiple contiguous axial images were acquired from the thoracic inlet to the upper abdomen without contrast administration. Coronal and sagittal reformatted images were also submitted.     FINDINGS:     The study is limited due to protocol with 5 mm slices compared to 1.25 mm slices on the previous exam.  There is similar fibrosis and honeycombing in the left greater than right lung bases more prominent in the lower lobes but with some extension into the lateral base of the left lower lobe and lingula.  As on the prior exam, there is traction bronchiectasis and reticular and reticulonodular interstitial changes in the left lower lobe greater than right lower lobe greater than elsewhere in both lung bases there is a 4.8 mm nodule there is unchanged in the ventral right lower lobe abutting the minor fissure on series 4, image 35.  There is an unchanged 2.5 mm nodule partially between image slices in the ventral right upper lobe on series 4, image 22.  On the prior outside exam, what is described as a 0.6 cm pleural base nodule more resembles pleural thickening/scarring on today's exam on series 4, image 14; series 9, image 28, and series 10, image 54.  Just cephalad to this, there is an unchanged 3.5 mm pleural base nodule on series 10, image 53 and series 4, image 12.  There is an unchanged 5 mm nodule is pleural based in the lateral ventral right lower lobe on series 4, image 36.  On the same image, there is an unchanged pleural based 3.2 mm nodule in the ventral lateral left lower lobe.  No new lung nodule/mass, suspicious  alveolar opacities, effusion, or pneumothorax is present.  The heart is enlarged similar in appearance to the previous exam.  The central pulmonary vessels and aorta are normal in size.  Mediastinal and hilar lymphadenopathy cannot be excluded without intravenous contrast.  The thyroid gland is poorly visualized in the thoracic inlet.  The upper abdomen is grossly unremarkable.     Impression:        1. Similar appearance of findings from interstitial lung disease with basilar predominance described on the previous exam as likely UIP versus NSIP.  2. Multiple sub 6 mm nodules in both lungs are unchanged.  On the prior exam, and area measures 0.6 cm pleural base nodule lateral at the right upper lobe today appears today more as pleural thickening/scarring with no definite nodule in this area.  As per Fleischner Society pulmonary nodule recommendations, recommend a follow-up CT scan in August 2024.  3. Similar cardiomegaly.        Electronically signed by:Herbie Hunt MD  Date:                                            01/26/2024          Cardiodiagnostics:  Results for orders placed during the hospital encounter of 02/05/21    Echo Color Flow Doppler? Yes    Interpretation Summary  · Tachycardia was present during the study  · The left ventricle is normal in size with concentric remodeling and hyperdynamic systolic function. The estimated ejection fraction is 75%  · Normal left ventricular diastolic function.  · Normal right ventricular size with normal right ventricular systolic function.  · The estimated PA systolic pressure is 21 mmHg.  · Normal central venous pressure (3 mmHg).    Results for orders placed during the hospital encounter of 01/26/24    Echo    Interpretation Summary  Normal LV function  EF 60%  Normal filling pressure  Insufficient TR jet for RVSP estimation        Assessment:  1. ILD (interstitial lung disease)    2. Other systemic lupus erythematosus with other organ involvement    3.  Raynaud's disease with gangrene    4. Allergic rhinitis, unspecified seasonality, unspecified trigger        Plan:     Followed for SLE-ILD.  CT imaging consistent with NSIP and less likely UIP. TTE does not show evidence of PH (sildenafil for Raynaud's and not PH). No exertional hypoxemia. Overall stable from a respiratory standpoint. Minimal downtrend in FVC/DLCO over the last two years. Discussed the role of anti-fibrotic's, but patient would like to defer therapy for now. Repeat CT chest stable.     Followed with Dr. Johnson for SLE. Currently on MMF and plaquenil. Continue with current therapy.    On Sildenafil for Raynaud's. Continue with current therapy.    Continue with nasal steroid and antihistamine for allergic rhinitis.    Follow-up in 6 months with repeat PFTs, 6MWT.       Oly Choudhury PA-C  Lung Transplant/Advanced Lung Disease

## 2024-07-18 NOTE — PROCEDURES
Christen Bowman is a 59 y.o.  female patient, who presents for a 6 minute walk test ordered by GREGORIO Higgins.  The diagnosis is Interstitial Lung Disease.  The patient's BMI is 25.3 kg/m2.  Predicted distance (lower limit of normal) is 387.82 meters.      Test Results:    The test was completed without stopping.  The total time walked was 360 seconds.  During walking, the patient reported:  Leg pain.  The patient used no assistive devices during testing.  Oxygen saturation was measured with nasal pulse oximetry probe.     07/17/2024---------Distance: 548.64 meters (1800 feet)     O2 Sat % Supplemental Oxygen Heart Rate Blood Pressure Dann Scale   Pre-exercise  (Resting) 99 % Room Air 63 bpm 156/99 mmHg 0   During Exercise 95 % Room Air 64 bpm 186/105 mmHg 2   Post-exercise  (Recovery) 98 % Room Air  68 bpm 179/100 mmHg      Recovery Time: 150 seconds    Performing nurse/tech: Bhargav VICENTE      PREVIOUS STUDY:   11/10/2022---------Distance: 527.91 meters (1732 feet)       O2 Sat % Supplemental Oxygen Heart Rate Blood Pressure Dann Scale   Pre-exercise  (Resting) 100 % Room Air 86 bpm 154/87 mmHg 0   During Exercise 97 % Room Air 130 bpm 183/93 mmHg 3   Post-exercise  (Recovery) 100 % Room Air  94 bpm 163/83 mmHg         CLINICAL INTERPRETATION:  Six minute walk distance is 548.64 meters (1800 feet) with light dyspnea.  During exercise, there was no significant desaturation while breathing room air.  Blood pressure increased significantly and Heart rate remained stable with walking.  Hypertension was present prior to exercise.  The patient reported non-pulmonary symptoms during exercise.  Since the previous study in November 2022, exercise capacity is unchanged.  Based upon age and body mass index, exercise capacity is normal.

## 2024-09-19 ENCOUNTER — PATIENT MESSAGE (OUTPATIENT)
Dept: RHEUMATOLOGY | Facility: CLINIC | Age: 59
End: 2024-09-19
Payer: MEDICARE

## 2024-10-24 ENCOUNTER — PATIENT MESSAGE (OUTPATIENT)
Dept: GASTROENTEROLOGY | Facility: CLINIC | Age: 59
End: 2024-10-24
Payer: MEDICARE

## 2024-11-25 ENCOUNTER — PATIENT MESSAGE (OUTPATIENT)
Dept: TRANSPLANT | Facility: CLINIC | Age: 59
End: 2024-11-25
Payer: MEDICARE

## 2024-12-09 ENCOUNTER — TELEPHONE (OUTPATIENT)
Dept: TRANSPLANT | Facility: CLINIC | Age: 59
End: 2024-12-09
Payer: MEDICARE

## 2024-12-09 NOTE — TELEPHONE ENCOUNTER
Spoke with Ms Bowman - wants to schedule ALD appts on 2/3. Will schedule once the ALD schedule is open    ----- Message from Miracle sent at 12/9/2024 11:33 AM CST -----  Regarding: Appontment  Contact: [phone]  Type:  Same Day Appointment Request    Caller is requesting a same day appointment.  Caller declined first available appointment listed below.    Name of Caller:Christen  When is the first available appointment?n/a  Symptoms:lung disease  Best Call Back Number:[phone]  Additional Information: Calling to schedule an appointment for 2/3/2025 as soon as possible. Please call patient to schedule today.

## 2024-12-26 DIAGNOSIS — M32.19 OTHER SYSTEMIC LUPUS ERYTHEMATOSUS WITH OTHER ORGAN INVOLVEMENT: Primary | ICD-10-CM

## 2024-12-27 ENCOUNTER — TELEPHONE (OUTPATIENT)
Dept: PULMONOLOGY | Facility: CLINIC | Age: 59
End: 2024-12-27
Payer: MEDICARE

## 2024-12-27 NOTE — TELEPHONE ENCOUNTER
----- Message from Yamilet sent at 12/27/2024 12:40 PM CST -----  Regarding: appt  Contact: [phone]  ..Type:  Needs to check availability    Who Called: pt   reason (please be specific): pt lives 4 hours away. Pt requested to have the series of test ordered by Luis Higgins, was unable to send message to that provider.  Pt asking to have those test scheduled as the same day as her visit with Dr Johnson on 2/3. Attempted to r/s appt with Dr Johnson, no availability     Would the patient rather a call back or a response via [facility]? Call back  Best Call Back Number: [phone]  Additional Information: n/a

## 2025-01-09 ENCOUNTER — TELEPHONE (OUTPATIENT)
Dept: TRANSPLANT | Facility: CLINIC | Age: 60
End: 2025-01-09
Payer: MEDICARE

## 2025-01-09 DIAGNOSIS — J84.9 ILD (INTERSTITIAL LUNG DISEASE): Primary | ICD-10-CM

## 2025-01-09 NOTE — TELEPHONE ENCOUNTER
Returned call to patient and informed her that her appointments have been rebooked to 2/3/25 per her request. The patient verbalized her appreciation and denied having any other needs at this time.       ----- Message from Luis Higgins NP sent at 1/9/2025  2:57 PM CST -----  Regarding: FW: appt  Contact: [phone]    February 3rd is fine    ----- Message -----  From: MayYamilet  Sent: 1/9/2025   2:35 PM CST  To: [facility]plant  Subject: appt                                             ..Type:  Needs appt     Who Called: pt   Reason (please be specific): system would not let me send message to Luis Higgins NP. Pt asking if the appts that were dated 1/13 could be scheduled for 2/3 as the pt has another appt at the facility. Pt lives 4 hours away    Would the patient rather a call back or a response via [facility]? Call back   Best Call Back Number: [phone]  Additional Information: n/a

## 2025-01-15 DIAGNOSIS — M32.19 OTHER SYSTEMIC LUPUS ERYTHEMATOSUS WITH OTHER ORGAN INVOLVEMENT: ICD-10-CM

## 2025-01-15 DIAGNOSIS — J84.9 ILD (INTERSTITIAL LUNG DISEASE): Primary | ICD-10-CM

## 2025-01-16 DIAGNOSIS — J84.9 ILD (INTERSTITIAL LUNG DISEASE): ICD-10-CM

## 2025-01-16 DIAGNOSIS — M32.9 SYSTEMIC LUPUS ERYTHEMATOSUS, UNSPECIFIED SLE TYPE, UNSPECIFIED ORGAN INVOLVEMENT STATUS: ICD-10-CM

## 2025-01-16 DIAGNOSIS — I73.01 RAYNAUD'S DISEASE WITH GANGRENE: ICD-10-CM

## 2025-01-16 NOTE — PROGRESS NOTES
Per provider visit 7/17/24 Lius Higgins, patient RTC January 2025 with PFTs, 6MWT. Start on room air, modified. Request to .

## 2025-01-17 RX ORDER — MYCOPHENOLATE MOFETIL 500 MG/1
1500 TABLET ORAL 2 TIMES DAILY
Qty: 180 TABLET | Refills: 4 | Status: SHIPPED | OUTPATIENT
Start: 2025-01-17

## 2025-01-23 RX ORDER — MYCOPHENOLATE MOFETIL 500 MG/1
1500 TABLET ORAL 2 TIMES DAILY
Qty: 180 TABLET | Refills: 4 | Status: SHIPPED | OUTPATIENT
Start: 2025-01-23 | End: 2025-02-03 | Stop reason: SDUPTHER

## 2025-01-24 ENCOUNTER — LAB VISIT (OUTPATIENT)
Dept: LAB | Facility: HOSPITAL | Age: 60
End: 2025-01-24
Attending: INTERNAL MEDICINE
Payer: MEDICARE

## 2025-01-24 DIAGNOSIS — M32.9 SYSTEMIC LUPUS ERYTHEMATOSUS, UNSPECIFIED SLE TYPE, UNSPECIFIED ORGAN INVOLVEMENT STATUS: ICD-10-CM

## 2025-01-24 LAB
ALBUMIN SERPL-MCNC: 4.4 G/DL (ref 3.4–5)
ALBUMIN/GLOB SERPL: 1.5 RATIO
ALP SERPL-CCNC: 104 UNIT/L (ref 50–144)
ALT SERPL-CCNC: 12 UNIT/L (ref 1–45)
ANION GAP SERPL CALC-SCNC: 5 MEQ/L (ref 2–13)
AST SERPL-CCNC: 27 UNIT/L (ref 14–36)
BASOPHILS # BLD AUTO: 0.09 X10(3)/MCL (ref 0.01–0.08)
BASOPHILS NFR BLD AUTO: 1.5 % (ref 0.1–1.2)
BILIRUB SERPL-MCNC: 0.5 MG/DL (ref 0–1)
BILIRUB UR QL STRIP.AUTO: NEGATIVE
BUN SERPL-MCNC: 14 MG/DL (ref 7–20)
C3 SERPL-MCNC: 96 MG/DL (ref 80–173)
C4 SERPL-MCNC: 25 MG/DL (ref 13–46)
CALCIUM SERPL-MCNC: 9.3 MG/DL (ref 8.4–10.2)
CHLORIDE SERPL-SCNC: 106 MMOL/L (ref 98–110)
CLARITY UR: CLEAR
CO2 SERPL-SCNC: 29 MMOL/L (ref 21–32)
COLOR UR AUTO: YELLOW
CREAT SERPL-MCNC: 0.85 MG/DL (ref 0.66–1.25)
CREAT UR-MCNC: 225.7 MG/DL
CREAT/UREA NIT SERPL: 16 (ref 12–20)
CRP SERPL-MCNC: <0.5 MG/DL
EOSINOPHIL # BLD AUTO: 0.35 X10(3)/MCL (ref 0.04–0.36)
EOSINOPHIL NFR BLD AUTO: 5.8 % (ref 0.7–7)
ERYTHROCYTE [DISTWIDTH] IN BLOOD BY AUTOMATED COUNT: 14 % (ref 11–14.5)
ERYTHROCYTE [SEDIMENTATION RATE] IN BLOOD: 2 MM/HR (ref 0–20)
GFR SERPLBLD CREATININE-BSD FMLA CKD-EPI: 80 ML/MIN/1.73/M2
GLOBULIN SER-MCNC: 3 GM/DL (ref 2–3.9)
GLUCOSE SERPL-MCNC: 85 MG/DL (ref 70–115)
GLUCOSE UR QL STRIP: NEGATIVE
HCT VFR BLD AUTO: 44.5 % (ref 36–48)
HGB BLD-MCNC: 14.4 G/DL (ref 11.8–16)
HGB UR QL STRIP: NEGATIVE
IMM GRANULOCYTES # BLD AUTO: 0.01 X10(3)/MCL (ref 0–0.03)
IMM GRANULOCYTES NFR BLD AUTO: 0.2 % (ref 0–0.5)
KETONES UR QL STRIP: NEGATIVE
LEUKOCYTE ESTERASE UR QL STRIP: NEGATIVE
LYMPHOCYTES # BLD AUTO: 2.24 X10(3)/MCL (ref 1.16–3.74)
LYMPHOCYTES NFR BLD AUTO: 37.3 % (ref 20–55)
MCH RBC QN AUTO: 29.4 PG (ref 27–34)
MCHC RBC AUTO-ENTMCNC: 32.4 G/DL (ref 31–37)
MCV RBC AUTO: 91 FL (ref 79–99)
MONOCYTES # BLD AUTO: 0.81 X10(3)/MCL (ref 0.24–0.36)
MONOCYTES NFR BLD AUTO: 13.5 % (ref 4.7–12.5)
NEUTROPHILS # BLD AUTO: 2.51 X10(3)/MCL (ref 1.56–6.13)
NEUTROPHILS NFR BLD AUTO: 41.7 % (ref 37–73)
NITRITE UR QL STRIP: NEGATIVE
NRBC BLD AUTO-RTO: 0 %
PH UR STRIP: 6 [PH]
PLATELET # BLD AUTO: 329 X10(3)/MCL (ref 140–371)
PMV BLD AUTO: 11.1 FL (ref 9.4–12.4)
POTASSIUM SERPL-SCNC: 4.5 MMOL/L (ref 3.5–5.1)
PROT SERPL-MCNC: 7.4 GM/DL (ref 6.3–8.2)
PROT UR QL STRIP: ABNORMAL
PROT UR STRIP-MCNC: 16 MG/DL
RBC # BLD AUTO: 4.89 X10(6)/MCL (ref 4–5.1)
SODIUM SERPL-SCNC: 140 MMOL/L (ref 136–145)
SP GR UR STRIP.AUTO: >=1.03 (ref 1–1.03)
URINE PROTEIN/CREATININE RATIO (OLG): 0.1
UROBILINOGEN UR STRIP-ACNC: 0.2
WBC # BLD AUTO: 6.01 X10(3)/MCL (ref 4–11.5)

## 2025-01-24 PROCEDURE — 84156 ASSAY OF PROTEIN URINE: CPT | Mod: TXP

## 2025-01-24 PROCEDURE — 86160 COMPLEMENT ANTIGEN: CPT | Mod: TXP

## 2025-01-24 PROCEDURE — 85652 RBC SED RATE AUTOMATED: CPT | Mod: TXP

## 2025-01-24 PROCEDURE — 80053 COMPREHEN METABOLIC PANEL: CPT | Mod: TXP

## 2025-01-24 PROCEDURE — 85025 COMPLETE CBC W/AUTO DIFF WBC: CPT | Mod: TXP

## 2025-01-24 PROCEDURE — 81003 URINALYSIS AUTO W/O SCOPE: CPT | Mod: TXP

## 2025-01-24 PROCEDURE — 36415 COLL VENOUS BLD VENIPUNCTURE: CPT | Mod: TXP

## 2025-01-24 PROCEDURE — 86140 C-REACTIVE PROTEIN: CPT | Mod: TXP

## 2025-01-31 ENCOUNTER — TELEPHONE (OUTPATIENT)
Dept: TRANSPLANT | Facility: CLINIC | Age: 60
End: 2025-01-31
Payer: MEDICARE

## 2025-02-03 ENCOUNTER — HOSPITAL ENCOUNTER (OUTPATIENT)
Dept: PULMONOLOGY | Facility: CLINIC | Age: 60
Discharge: HOME | End: 2025-02-03
Payer: MEDICARE

## 2025-02-03 ENCOUNTER — OFFICE VISIT (OUTPATIENT)
Dept: TRANSPLANT | Facility: CLINIC | Age: 60
End: 2025-02-03
Payer: MEDICARE

## 2025-02-03 ENCOUNTER — LAB VISIT (OUTPATIENT)
Dept: LAB | Facility: HOSPITAL | Age: 60
End: 2025-02-03
Attending: INTERNAL MEDICINE
Payer: MEDICARE

## 2025-02-03 ENCOUNTER — OFFICE VISIT (OUTPATIENT)
Dept: RHEUMATOLOGY | Facility: CLINIC | Age: 60
End: 2025-02-03
Payer: MEDICARE

## 2025-02-03 VITALS
DIASTOLIC BLOOD PRESSURE: 93 MMHG | HEIGHT: 66 IN | BODY MASS INDEX: 24.7 KG/M2 | SYSTOLIC BLOOD PRESSURE: 138 MMHG | WEIGHT: 153.69 LBS | HEART RATE: 72 BPM

## 2025-02-03 VITALS
WEIGHT: 153 LBS | TEMPERATURE: 98 F | DIASTOLIC BLOOD PRESSURE: 91 MMHG | BODY MASS INDEX: 24.59 KG/M2 | BODY MASS INDEX: 24.59 KG/M2 | HEART RATE: 71 BPM | WEIGHT: 153 LBS | HEIGHT: 66 IN | OXYGEN SATURATION: 98 % | RESPIRATION RATE: 16 BRPM | HEIGHT: 66 IN | SYSTOLIC BLOOD PRESSURE: 104 MMHG

## 2025-02-03 DIAGNOSIS — J30.9 ALLERGIC RHINITIS, UNSPECIFIED SEASONALITY, UNSPECIFIED TRIGGER: ICD-10-CM

## 2025-02-03 DIAGNOSIS — J84.9 ILD (INTERSTITIAL LUNG DISEASE): ICD-10-CM

## 2025-02-03 DIAGNOSIS — M32.19 OTHER SYSTEMIC LUPUS ERYTHEMATOSUS WITH OTHER ORGAN INVOLVEMENT: ICD-10-CM

## 2025-02-03 DIAGNOSIS — Z79.899 IMMUNODEFICIENCY DUE TO DRUG THERAPY: Primary | ICD-10-CM

## 2025-02-03 DIAGNOSIS — M32.9 SYSTEMIC LUPUS ERYTHEMATOSUS, UNSPECIFIED SLE TYPE, UNSPECIFIED ORGAN INVOLVEMENT STATUS: Primary | ICD-10-CM

## 2025-02-03 DIAGNOSIS — D84.821 IMMUNODEFICIENCY DUE TO DRUG THERAPY: Primary | ICD-10-CM

## 2025-02-03 DIAGNOSIS — M32.9 SYSTEMIC LUPUS ERYTHEMATOSUS, UNSPECIFIED SLE TYPE, UNSPECIFIED ORGAN INVOLVEMENT STATUS: ICD-10-CM

## 2025-02-03 DIAGNOSIS — I73.01 RAYNAUD'S DISEASE WITH GANGRENE: ICD-10-CM

## 2025-02-03 DIAGNOSIS — J84.9 ILD (INTERSTITIAL LUNG DISEASE): Primary | ICD-10-CM

## 2025-02-03 LAB
BEAKER SEE SCANNED REPORT: NORMAL
DLCO ADJ PRE: 15.98 ML/(MIN*MMHG) (ref 18.69–30.16)
DLCO SINGLE BREATH LLN: 18.69
DLCO SINGLE BREATH PRE REF: 67.3 %
DLCO SINGLE BREATH REF: 24.43
DLCOC SBVA LLN: 3.24
DLCOC SBVA PRE REF: 91.4 %
DLCOC SBVA REF: 4.63
DLCOC SINGLE BREATH LLN: 18.69
DLCOC SINGLE BREATH PRE REF: 65.4 %
DLCOC SINGLE BREATH REF: 24.43
DLCOCSBVAULN: 6.02
DLCOCSINGLEBREATHULN: 30.16
DLCOCSINGLEBREATHZSCORE: -2.42
DLCOSINGLEBREATHULN: 30.16
DLCOSINGLEBREATHZSCORE: -2.29
DLCOVA LLN: 3.24
DLCOVA PRE REF: 94.1 %
DLCOVA PRE: 4.36 ML/(MIN*MMHG*L) (ref 3.24–6.02)
DLCOVA REF: 4.63
DLCOVAULN: 6.02
DLVAADJ PRE: 4.23 ML/(MIN*MMHG*L) (ref 3.24–6.02)
ERV LLN: -16449.13
ERV PRE REF: 108.6 %
ERV REF: 0.87
ERVULN: ABNORMAL
FEF 25 75 LLN: 1.68
FEF 25 75 PRE REF: 34.6 %
FEF 25 75 REF: 3.08
FEV05 LLN: 1.13
FEV05 REF: 1.98
FEV1 FVC LLN: 68
FEV1 FVC PRE REF: 86.1 %
FEV1 FVC REF: 80
FEV1 LLN: 1.73
FEV1 PRE REF: 74.5 %
FEV1 REF: 2.35
FEV1FVCZSCORE: -1.6
FEV1ZSCORE: -1.59
FRCPLETH LLN: 1.99
FRCPLETH PREREF: 78.3 %
FRCPLETH REF: 2.81
FRCPLETHULN: 3.63
FVC LLN: 2.21
FVC PRE REF: 86 %
FVC REF: 2.97
FVCZSCORE: -0.89
IVC PRE: 2.58 L (ref 2.21–3.76)
IVC SINGLE BREATH LLN: 2.21
IVC SINGLE BREATH PRE REF: 86.8 %
IVC SINGLE BREATH REF: 2.97
IVCSINGLEBREATHULN: 3.76
LLN IC: -16447.7
PEF LLN: 4
PEF PRE REF: 87.8 %
PEF REF: 6.13
PHYSICIAN COMMENT: ABNORMAL
PRE DLCO: 16.45 ML/(MIN*MMHG) (ref 18.69–30.16)
PRE ERV: 0.94 L (ref -16449.13–16450.87)
PRE FEF 25 75: 1.06 L/S (ref 1.68–4.48)
PRE FET 100: 6.58 SEC
PRE FEV05 REF: 68.2 %
PRE FEV1 FVC: 68.71 % (ref 68.35–89.52)
PRE FEV1: 1.75 L (ref 1.73–2.95)
PRE FEV5: 1.35 L (ref 1.13–2.84)
PRE FRC PL: 2.2 L (ref 1.99–3.63)
PRE FVC: 2.55 L (ref 2.21–3.76)
PRE IC: 1.61 L (ref -16447.7–16452.3)
PRE PEF: 5.38 L/S (ref 4–8.26)
PRE REF IC: 70.2 %
PRE RV: 1.26 L (ref 1.37–2.52)
PRE TLC: 3.82 L (ref 4.29–6.26)
RAW PRE REF: 128.7 %
RAW PRE: 3.94 CMH2O*S/L (ref 3.06–3.06)
RAW REF: 3.06
REF IC: 2.3
RV LLN: 1.37
RV PRE REF: 64.8 %
RV REF: 1.95
RVTLC LLN: 29
RVTLC PRE REF: 86.2 %
RVTLC PRE: 33.06 % (ref 28.75–47.93)
RVTLC REF: 38
RVTLCULN: 48
RVULN: 2.52
SGAW PRE REF: 88.5 %
SGAW PRE: 0.09 1/(CMH2O*S) (ref 0.1–0.1)
SGAW REF: 0.1
TLC LLN: 4.29
TLC PRE REF: 72.3 %
TLC REF: 5.27
TLC ULN: 6.26
TLCZSCORE: -2.43
ULN IC: ABNORMAL
VA PRE: 3.77 L (ref 5.12–5.12)
VA SINGLE BREATH LLN: 5.12
VA SINGLE BREATH PRE REF: 73.7 %
VA SINGLE BREATH REF: 5.12
VASINGLEBREATHULN: 5.12
VC LLN: 2.21
VC PRE REF: 86 %
VC PRE: 2.55 L (ref 2.21–3.76)
VC REF: 2.97
VC ULN: 3.76

## 2025-02-03 PROCEDURE — 1160F RVW MEDS BY RX/DR IN RCRD: CPT | Mod: CPTII,NTX,S$GLB, | Performed by: NURSE PRACTITIONER

## 2025-02-03 PROCEDURE — 3008F BODY MASS INDEX DOCD: CPT | Mod: CPTII,NTX,S$GLB, | Performed by: NURSE PRACTITIONER

## 2025-02-03 PROCEDURE — 96372 THER/PROPH/DIAG INJ SC/IM: CPT | Mod: NTX,S$GLB,, | Performed by: INTERNAL MEDICINE

## 2025-02-03 PROCEDURE — 3080F DIAST BP >= 90 MM HG: CPT | Mod: CPTII,NTX,S$GLB, | Performed by: INTERNAL MEDICINE

## 2025-02-03 PROCEDURE — 99215 OFFICE O/P EST HI 40 MIN: CPT | Mod: 25,NTX,S$GLB, | Performed by: INTERNAL MEDICINE

## 2025-02-03 PROCEDURE — 99999 PR PBB SHADOW E&M-EST. PATIENT-LVL III: CPT | Mod: PBBFAC,TXP,, | Performed by: INTERNAL MEDICINE

## 2025-02-03 PROCEDURE — 1159F MED LIST DOCD IN RCRD: CPT | Mod: CPTII,NTX,S$GLB, | Performed by: NURSE PRACTITIONER

## 2025-02-03 PROCEDURE — 3074F SYST BP LT 130 MM HG: CPT | Mod: CPTII,NTX,S$GLB, | Performed by: NURSE PRACTITIONER

## 2025-02-03 PROCEDURE — 94010 BREATHING CAPACITY TEST: CPT | Mod: 59,NTX,S$GLB, | Performed by: INTERNAL MEDICINE

## 2025-02-03 PROCEDURE — 36415 COLL VENOUS BLD VENIPUNCTURE: CPT | Mod: TXP | Performed by: INTERNAL MEDICINE

## 2025-02-03 PROCEDURE — 94726 PLETHYSMOGRAPHY LUNG VOLUMES: CPT | Mod: NTX,S$GLB,, | Performed by: INTERNAL MEDICINE

## 2025-02-03 PROCEDURE — 99999 PR PBB SHADOW E&M-EST. PATIENT-LVL IV: CPT | Mod: PBBFAC,TXP,, | Performed by: NURSE PRACTITIONER

## 2025-02-03 PROCEDURE — 3008F BODY MASS INDEX DOCD: CPT | Mod: CPTII,NTX,S$GLB, | Performed by: INTERNAL MEDICINE

## 2025-02-03 PROCEDURE — 94729 DIFFUSING CAPACITY: CPT | Mod: NTX,S$GLB,, | Performed by: INTERNAL MEDICINE

## 2025-02-03 PROCEDURE — 3080F DIAST BP >= 90 MM HG: CPT | Mod: CPTII,NTX,S$GLB, | Performed by: NURSE PRACTITIONER

## 2025-02-03 PROCEDURE — 1159F MED LIST DOCD IN RCRD: CPT | Mod: CPTII,NTX,S$GLB, | Performed by: INTERNAL MEDICINE

## 2025-02-03 PROCEDURE — 3075F SYST BP GE 130 - 139MM HG: CPT | Mod: CPTII,NTX,S$GLB, | Performed by: INTERNAL MEDICINE

## 2025-02-03 PROCEDURE — 86225 DNA ANTIBODY NATIVE: CPT | Mod: TXP | Performed by: INTERNAL MEDICINE

## 2025-02-03 PROCEDURE — 94618 PULMONARY STRESS TESTING: CPT | Mod: NTX,S$GLB,, | Performed by: INTERNAL MEDICINE

## 2025-02-03 PROCEDURE — 99214 OFFICE O/P EST MOD 30 MIN: CPT | Mod: 25,NTX,S$GLB, | Performed by: NURSE PRACTITIONER

## 2025-02-03 RX ORDER — KETOROLAC TROMETHAMINE 30 MG/ML
60 INJECTION, SOLUTION INTRAMUSCULAR; INTRAVENOUS
Status: COMPLETED | OUTPATIENT
Start: 2025-02-03 | End: 2025-02-03

## 2025-02-03 RX ORDER — SILDENAFIL CITRATE 20 MG/1
20 TABLET ORAL 3 TIMES DAILY
Qty: 270 TABLET | Refills: 4 | Status: ACTIVE | OUTPATIENT
Start: 2025-02-03 | End: 2026-02-03

## 2025-02-03 RX ORDER — HYDROXYCHLOROQUINE SULFATE 200 MG/1
200 TABLET, FILM COATED ORAL 2 TIMES DAILY
Qty: 180 TABLET | Refills: 4 | Status: SHIPPED | OUTPATIENT
Start: 2025-02-03

## 2025-02-03 RX ORDER — NIFEDIPINE 90 MG/1
90 TABLET, EXTENDED RELEASE ORAL DAILY
Qty: 30 TABLET | Refills: 11 | Status: SHIPPED | OUTPATIENT
Start: 2025-02-03 | End: 2026-02-03

## 2025-02-03 RX ORDER — DICLOFENAC SODIUM 10 MG/G
GEL TOPICAL 4 TIMES DAILY
Qty: 100 G | Refills: 11 | Status: SHIPPED | OUTPATIENT
Start: 2025-02-03

## 2025-02-03 RX ORDER — MYCOPHENOLATE MOFETIL 500 MG/1
1500 TABLET ORAL 2 TIMES DAILY
Qty: 180 TABLET | Refills: 4 | Status: SHIPPED | OUTPATIENT
Start: 2025-02-03

## 2025-02-03 RX ADMIN — KETOROLAC TROMETHAMINE 60 MG: 30 INJECTION, SOLUTION INTRAMUSCULAR; INTRAVENOUS at 09:02

## 2025-02-03 NOTE — PROCEDURES
Christen Bowman is a 59 y.o.   female patient, who presents for a 6 minute walk test ordered by GREGORIO Higgins.  The diagnosis is Interstitial Lung Disease; Connective Tissue Disease.  The patient's BMI is 24.7 kg/m2.  Predicted distance (lower limit of normal) is 391.56 meters.      Test Results:    The test was completed without stopping.  The total time walked was 360 seconds.  During walking, the patient reported:  Leg/hip pain.  The patient used no assistive devices during testing.  Oxygen saturation was measured with nasal pulse oximetry probe.    02/03/2025---------Distance: 457.2 meters (1500 feet)     Lap Walk Time O2 Sat % Supplemental Oxygen Heart Rate Blood Pressure Dann Scale Comment   Pre-exercise  (Resting) 0 0 96 % Room Air 68 bpm 139/90 mmHg 0 SpO2 by nasal oximetry probe.   End of Exercise  360 sec 95 % Room Air 84 bpm 150/92 mmHg 0.5    Post-exercise  (Recovery)   97 % Room Air  79 bpm        Recovery Time: 103 seconds    Performing nurse/tech: CINTHIA Parrish      PREVIOUS STUDY:   07/17/2024---------Distance: 548.64 meters (1800 feet)       O2 Sat % Supplemental Oxygen Heart Rate Blood Pressure Dann Scale   Pre-exercise  (Resting) 99 % Room Air 63 bpm 156/99 mmHg 0   During Exercise 95 % Room Air 64 bpm 186/105 mmHg 2   Post-exercise  (Recovery) 98 % Room Air  68 bpm 179/100 mmHg         CLINICAL INTERPRETATION:  Six minute walk distance is 457.2 meters (1500 feet) with very, very light dyspnea.  During exercise, there was no significant desaturation while breathing room air.  Both blood pressure and heart rate remained stable with walking.  Hypertension was present prior to exercise.  The patient reported non-pulmonary symptoms during exercise.  Since the previous study in July 2024, exercise capacity is significantly worse.  Based upon age and body mass index, exercise capacity is normal.

## 2025-02-03 NOTE — PROGRESS NOTES
"Per order, patient to receive  60 mg of Toradol (ketorolac tromethamine) 60mg/2mL. The area of injection was palpated using the medial fold and the iliac crest as anatomical landmarks. Patient was advised to relax the muscle. The area was cleaned with alcohol and allowed to dry. Using a 25g 1.5" needle, 2mL of Toradol (ketorolac tromethamine) 60mg/2mL was placed intramuscularly into the right upper outer gluteal quadrant. Patient advised to wait for 15 minutes after injection. Patient experienced no complications and was discharged in stable condition.  Lot: [id number] Exp: 02/2025     "

## 2025-02-03 NOTE — PROGRESS NOTES
Subjective:       Patient ID: Christen Bowman is a 55 y.o. female.    Chief Complaint: No chief complaint on file.    HPI 55 year old F with PMH of HTN, hypothyroidism, SLE, RA here to establish care. She was diagnosed with SLE in 1996  when she presented with +KENNY, arthritis , rash, raynauds,pleurisy (pericarditis),  and thrombocytopenia.  She reports she came in to hospital due to chest pain and pain/swelling in hands and elbows.  She was told to have pericardial effusion.   She was hospitalized for thrombocytopenia around age 22 and was treated with high dose steroids and Iv IG/plasmapheresis. She required splenectomy in   Due to thrombocytopenia.  She had one first trimester pregnancy loss. She was initially put on plaquenil when she was diagnosed but it was stopped at some point.        About a year ago (1/2020), she developed aphasia/confusion and had CVA. She was treated with high doses of steroids and Iv IG but no anticoagulation.  Since the stroke, she is more forgetful.        She as recently hospitalized in outside hospital for arthritis, raynauds causing ulcerations with digital ischemia and bacteremia.   It appears on December 12,2020, she developed URI symptoms (covid test-negative). She was having sinus drainage. She went to urgent care.  She went home and the next day,she noticed raynauds in hands and feet.  Within a matter of hours, she developed the blisters in lower extremities.   She was noted to have platelets of 5000 and creat 1.6 (baseline 0.64). White count had increased to 30 thousand. During admission, she developed severe ulcerations in her lower legs and toes and gangrene developed in toes. She was treated with high doses of steroids. She was also treated with antibiotics for gram positive cocci in blood cultures. Apparently, she also had bone marrow biopsy which did not show any significant changes and  BALDO did not show endocarditis.  During hospitalization, patient developed worsening  ulcerations and gangrene of multiple toes. She had lower extremity US which did not show DVT and was not put on anticoagulation. She was discharged on prednisone 80mg daily, mtx 10mg po week, diltiazem 360mg ER, nystatin, tylenol-3, estradiol and HCTZ 25mg poqday.        She is on clindamycin 300mg po q8 hours , prednisone 10mg a day, MTX 4 pills once a week, folic acid, oxycodone 10/325mg po qhs. She saw Dr. Yash Santana about 2 weeks ago. At that time, she was put on clindamycin. She saw orthopedic who is considering amputation.  Denies fevers, pleurisy, sob, alopecia. Reports raynauds in hands and feet.    Interval history: She has pain in both hips that radiates to the groin bilaterally.  She has not been in the hospital. She is off prednisone.  She is taking cellcept 1500mg po BID.She is on sildenafil TID. She is taking nifedipene  60mg po qhs..She is taking plaquenil 200mg po BID. Denies any rashes, oral ulcers, hair loss, joint pain or swelling. She has felt balance for a month.     Past Medical History:   Diagnosis Date    Raynaud's disease     Rheumatoid arteritis     Systemic lupus erythematosus    ,  Review of Systems   Constitutional: Negative for activity change, appetite change, chills, diaphoresis, fatigue and fever.   HENT: Negative for congestion, ear discharge, ear pain, facial swelling, mouth sores, sinus pressure, sneezing, sore throat, tinnitus and trouble swallowing.    Eyes: Negative for photophobia, pain, discharge, redness, itching and visual disturbance.   Respiratory: Negative for apnea, chest tightness, shortness of breath, wheezing and stridor.    Cardiovascular: Negative for leg swelling.   Gastrointestinal: Negative for abdominal distention, abdominal pain, anal bleeding, blood in stool, constipation, diarrhea and nausea.   Endocrine: Negative for cold intolerance and heat intolerance.   Genitourinary: Negative for difficulty urinating and dysuria.   Musculoskeletal: Negative for  arthralgias, back pain, gait problem, joint swelling, myalgias, neck pain and neck stiffness.   Skin: Negative for color change, pallor, rash and wound.   Neurological: Negative for dizziness, seizures, light-headedness and numbness.   Hematological: Negative for adenopathy. Does not bruise/bleed easily.   Psychiatric/Behavioral: Negative for sleep disturbance. The patient is not nervous/anxious.              Objective:   There were no vitals taken for this visit.   BP: 142/98  Physical Exam   Constitutional: She is oriented to person, place, and time.   HENT:   Head: Normocephalic and atraumatic.   Right Ear: External ear normal.   Left Ear: External ear normal.   Nose: Nose normal.   Mouth/Throat: Oropharynx is clear and moist. No oropharyngeal exudate.   Eyes: Conjunctivae and EOM are normal. Pupils are equal, round, and reactive to light. Right eye exhibits no discharge. Left eye exhibits no discharge. No scleral icterus.   Neck: No JVD present. No thyromegaly present.   Cardiovascular: Normal rate, regular rhythm, normal heart sounds and intact distal pulses.  Exam reveals no gallop and no friction rub.    No murmur heard.  Pulmonary/Chest: Effort normal and breath sounds normal. No respiratory distress. She has no wheezes. She has no rales. She exhibits no tenderness.   Abdominal: Soft. Bowel sounds are normal. She exhibits no distension and no mass. There is no abdominal tenderness. There is no rebound and no guarding.   Lymphadenopathy:     She has no cervical adenopathy.   Neurological: She is alert and oriented to person, place, and time. No cranial nerve deficit. Gait normal. Coordination normal.   Skin: Skin is dry. No rash noted. No erythema. No pallor.     Psychiatric: Affect and judgment normal.   Musculoskeletal: Tenderness and edema present. No deformity.      Comments: Gangrenous digits in left foot;   Dark Discoloration in second through fourth toes on right    Lower shins wrapped      labs:  3/3/2021  Wbc-12.6  Hematocrit-37  Plate-389  Labs 2/3/21  cmp-[id number]  No data to display     Assessment:     59 year old F with PMH of HTN, hypothyroidism, SLE, RA here for follow up of SLE.   # SLE: She was diagnosed with SLE in 1996  when she presented with +KENNY,+dsdna,  arthritis , rash, raynauds,pleurisy (pericarditis),  and thrombocytopenia. She was recently hospitalized for severe raynauds leading to gangrene in right foot and is s/p partial amputation 3/30/2021.During that hospitalization, she was noted to have  abnormal CT chest concerning for ILD.    Given her history of CVA, first trimester pregnancy loss, and progressive gangrene, patient was admitted for anticoagulation for suspected APS but APS work up was negative.  She was admitted from 2/5 to 2/11/21 for  epoprostenol for 5 days and pulse dose steroids.     SLE:  +KENNY,+dsdna,  arthritis , rash, raynauds,pleurisy (pericarditis), thrombocytopenia, severe raynauds requiring amputation.  #raynauds: doing much better.  Continue nifedipine 60mg poq day (takes 30mg tablets)  Continue sildenafil 20mg po TID  Continue cellcept 1500mg po BID  Continue plaquenil twice a day  ( eye exam  done in 9/2023-clear)  Continue aspirin 81 mg po qday  Consider benylsta injections if needed.  LABS today and before next visit       #ILD: noted on CT chest. Denies symptoms  -cellcept as above  -following in ILD clinic    #  balance issues: follow up with neurologist    # bilateral hip pain: asked her to see orthopedic doctor locally and to consider pain management.  Toradol 60mg IM X1      45 * minutes of total time spent on the encounter, which includes face to face time and non-face to face time preparing to see the patient (eg, review of tests), Obtaining and/or reviewing separately obtained history, Documenting clinical information in the electronic or other health record, Independently interpreting results (not separately reported) and communicating results to the  patient/family/caregiver, or Care coordination (not separately reported).

## 2025-02-03 NOTE — LETTER
February 3, 2025        Laurel Johnson  [address]  [address]  [place] [address]  Phone: [phone]  Fax: [phone]             [facility] 1st Fl  [address]  [facility] [address]  Phone: [phone]   Patient: Christen Bowman   MR Number: [id number]   YOB: 1965   Date of Visit: 2/3/2025       Dear Dr. Laurel Johnson    Thank you for referring Christen Bowman to me for evaluation. Attached you will find relevant portions of my assessment and plan of care.    If you have questions, please do not hesitate to call me. I look forward to following Christen Bowman along with you.    Sincerely,    Luis Higgins NP    Enclosure    If you would like to receive this communication electronically, please contact [email or web address] or [phone] to request [employer] Link access.    [employer] is a tool which provides read-only access to select patient information with whom you have a relationship. Its easy to use and provides real time access to review your patients record including encounter summaries, notes, results, and demographic information.    If you feel you have received this communication in error or would no longer like to receive these types of communications, please e-mail [email or web address]

## 2025-02-03 NOTE — PROGRESS NOTES
ADVANCED LUNG DISEASE CLINIC FOLLOW UP VISIT                                                                                                                                              Reason for Visit:  SLE with lung involvement, ILD    Referring Physician: No ref. provider found    History of Present Illness: Christen Bowman is a 59 y.o. female who is on 0L of oxygen.  She is on no assisted ventilation.  Her New York Heart Association Class is I and a Karnofsky score of 90% - Able to carry on normal activity: minor symptoms of disease. She is not diabetic.     Followed for SLE-ILD. Currently on MMF and plaquenil. Takes sildenafil and nifedipine for Raynauds. Denies dyspnea, cough, or fevers. Remains very active and takes all medications as directed. Received a ketorolac injection today in her rheumatology clinic for hip/leg pain.  Follows with her cardiologist and rheumatologist. Denies recent exacerbations/hospitalizations. Overall doing very well.             Past Medical History:   Diagnosis Date    Essential hypertension 12/23/2019 2:02:07 PM    [facility] Historical - Cardiovascular: Hypertension-No Additional Notes    Essential hypertension 12/23/2019 2:02:07 PM    [facility] Historical - Cardiovascular: Hypertension-No Additional Notes    Raynaud's disease     Rheumatoid arteritis     Systemic lupus erythematosus        Past Surgical History:   Procedure Laterality Date    SPLENECTOMY      TONSILLECTOMY      TUBAL LIGATION         Allergies: Patient has no known allergies.    Current Outpatient Medications   Medication Sig    aspirin (ECOTRIN) 81 MG EC tablet Take 81 mg by mouth once daily.    diclofenac sodium (VOLTAREN) 1 % Gel Apply topically 4 (four) times daily.    estradioL (ESTRACE) 1 MG tablet TAKE ONE TABLET BY MOUTH EVERY DAY    hydroxychloroquine (PLAQUENIL) 200 mg tablet Take 1 tablet (200 mg total) by mouth 2 (two) times daily.    levothyroxine (SYNTHROID) 50 MCG tablet Take 1 tablet (50  mcg total) by mouth every morning.    mycophenolate (CELLCEPT) 500 mg Tab TAKE THREE TABLETS BY MOUTH TWICE DAILY    mycophenolate (CELLCEPT) 500 mg Tab Take 3 tablets (1,500 mg total) by mouth 2 (two) times daily.    NIFEdipine (PROCARDIA-XL) 90 MG (OSM) 24 hr tablet Take 1 tablet (90 mg total) by mouth once daily.    pregabalin (LYRICA ORAL) Take 150 mg by mouth 2 (two) times a day.    progesterone (PROMETRIUM) 200 MG capsule TAKE ONE CAPSULE BY MOUTH EVERY DAY    sildenafil (REVATIO) 20 mg Tab Take 1 tablet (20 mg total) by mouth 3 (three) times daily.    azelastine (ASTELIN) 137 mcg (0.1 %) nasal spray 1 spray (137 mcg total) by Nasal route 2 (two) times daily.    NIFEdipine (ADALAT CC) 30 MG TbSR Take 2 tablets (60 mg total) by mouth once daily.    pilocarpine (SALAGEN) 5 MG Tab Take 1 tablet (5 mg total) by mouth 2 (two) times daily.    primidone (MYSOLINE) 50 MG Tab Take 50 mg by mouth every evening.     No current facility-administered medications for this visit.       Immunization History   Administered Date(s) Administered    COVID-19, MRNA, LN-S, PF (Pfizer) (Gray Cap) 03/21/2022    COVID-19, MRNA, LN-S, PF (Pfizer) (Purple Cap) 08/19/2021, 09/09/2021    COVID-19, mRNA, LNP-S, bivalent booster, PF (Moderna Omicron)12 + YEARS 10/01/2022    Influenza - Quadrivalent - PF *Preferred* (6 months and older) 09/22/2013, 10/17/2014, 02/11/2021    Pneumococcal Polysaccharide - 23 Valent 10/17/2014     Family History:    Family History   Problem Relation Name Age of Onset    Hypertension Mother      Lupus Mother      Cancer Mother      Hypertension Father      Anuerysm Father       Social History     Substance and Sexual Activity   Alcohol Use Not Currently    Comment: rarely - special occaisions      Social History     Substance and Sexual Activity   Drug Use Not Currently    Types: Marijuana    Comment: prescribed uses once or twice a month edibles. for her hip pain      Social History     Socioeconomic History     Marital status:    Tobacco Use    Smoking status: Never     Passive exposure: Never    Smokeless tobacco: Never   Substance and Sexual Activity    Alcohol use: Not Currently     Comment: rarely - special occaisions    Drug use: Not Currently     Types: Marijuana     Comment: prescribed uses once or twice a month edibles. for her hip pain    Sexual activity: Yes     Partners: Male     Social Drivers of Health     Financial Resource Strain: High Risk (1/17/2024)    Overall Financial Resource Strain (CARDIA)     Difficulty of Paying Living Expenses: Hard   Food Insecurity: Food Insecurity Present (1/17/2024)    Hunger Vital Sign     Worried About Running Out of Food in the Last Year: Often true     Ran Out of Food in the Last Year: Sometimes true   Transportation Needs: No Transportation Needs (1/17/2024)    PRAPARE - Transportation     Lack of Transportation (Medical): No     Lack of Transportation (Non-Medical): No   Physical Activity: Insufficiently Active (1/17/2024)    Exercise Vital Sign     Days of Exercise per Week: 2 days     Minutes of Exercise per Session: 30 min   Stress: Stress Concern Present (1/17/2024)    [employer] - Occupational Stress Questionnaire     Feeling of Stress : To some extent   Housing Stability: Unknown (1/17/2024)    Housing Stability Vital Sign     Unable to Pay for Housing in the Last Year: Patient declined     Number of Places Lived in the Last Year: 1     Unstable Housing in the Last Year: No     Review of Systems   Constitutional: Negative.  Negative for chills, diaphoresis, fever, malaise/fatigue and weight loss.   HENT:  Negative for congestion, ear pain, nosebleeds and sinus pain.    Eyes:  Negative for blurred vision, photophobia and pain.   Respiratory:  Negative for cough, hemoptysis, sputum production, shortness of breath and wheezing.    Cardiovascular:  Negative for chest pain, palpitations, claudication and leg swelling.  "  Gastrointestinal:  Negative for constipation, diarrhea, heartburn, nausea and vomiting.   Genitourinary:  Negative for frequency and urgency.   Musculoskeletal:  Positive for joint pain (chronic hip pain). Negative for falls and myalgias.   Skin:  Negative for rash.   Neurological:  Positive for tremors. Negative for dizziness, focal weakness, weakness and headaches.   Endo/Heme/Allergies:  Negative for environmental allergies and polydipsia. Does not bruise/bleed easily.   Psychiatric/Behavioral:  Negative for depression. The patient is not nervous/anxious.      Vitals  BP (!) 104/91 (BP Location: Right arm, Patient Position: Sitting)   Pulse 71   Temp 98.3 °F (36.8 °C) (Oral)   Resp 16   Ht 5' 6" (1.676 m)   Wt 69.4 kg (153 lb)   SpO2 98%   BMI 24.69 kg/m²   Physical Exam  Vitals and nursing note reviewed.   Constitutional:       General: She is not in acute distress.     Appearance: Normal appearance. She is well-developed. She is not diaphoretic.   HENT:      Head: Normocephalic and atraumatic.   Eyes:      General: No scleral icterus.     Extraocular Movements: Extraocular movements intact.      Conjunctiva/sclera: Conjunctivae normal.   Neck:      Thyroid: No thyromegaly.      Vascular: No JVD.      Trachea: No tracheal deviation.   Cardiovascular:      Rate and Rhythm: Normal rate and regular rhythm.      Heart sounds: Normal heart sounds. No murmur heard.     No friction rub. No gallop.   Pulmonary:      Effort: Pulmonary effort is normal. No respiratory distress.      Breath sounds: Normal breath sounds. No stridor. No wheezing, rhonchi or rales.   Chest:      Chest wall: No tenderness.   Abdominal:      General: Bowel sounds are normal. There is no distension.      Palpations: Abdomen is soft.      Tenderness: There is no abdominal tenderness.   Musculoskeletal:         General: No deformity. Normal range of motion.      Cervical back: Normal range of motion and neck supple.   Skin:     General: " Skin is warm and dry.      Capillary Refill: Capillary refill takes less than 2 seconds.      Coloration: Skin is not pale.      Findings: No erythema or rash.   Neurological:      Mental Status: She is alert and oriented to person, place, and time.      Cranial Nerves: No cranial nerve deficit.   Psychiatric:         Mood and Affect: Mood normal.         Judgment: Judgment normal.         Labs:          2/3/2025    11:12 AM 7/17/2024    11:32 AM 4/30/2024     9:48 AM 1/19/2024    10:07 AM 5/23/2023    11:05 AM 11/10/2022     8:32 AM 5/5/2022    11:00 AM   Pulmonary Function Tests   FVC 2.55 liters 2.47 liters 2.62 liters 2.55 liters 2.55 liters 2.57 liters 2.63 liters   FEV1 1.75 liters 1.76 liters 1.83 liters 1.75 liters 1.85 liters 1.86 liters 1.93 liters   TLC (liters) 3.82 liters 3.95 liters 3.71 liters 4.1 liters 3.77 liters 3.87 liters 4.12 liters   DLCO (ml/mmHg sec) 16.45 ml/mmHg sec 14.84 ml/mmHg sec 16.57 ml/mmHg sec 13.54 ml/mmHg sec 15.74 ml/mmHg sec 15.52 ml/mmHg sec 15.5 ml/mmHg sec   FVC% 86 82.7 87.6 85 84.5 85 86.5   FEV1% 74.5 74.3 77.2 73.7 77 77 79.8   FEF 25-75 1.06 1.09 1.15 1.1 1.28 1.32 1.26   FEF 25-75% 34.6 35.5 52 49.2 56.9 58 54.6   TLC% 72.3 74.9 70.4 77.8 71.5 74 78.2   RV 1.26 1.49 1.09 1.56 1.23 1.3 1.49   RV% 64.8 76.3 56.2 80.6 63.5 68 77.9   DLCO% 67.3 60.8 67.9 55.1 64 62 15.5         2/3/2025    10:43 AM 7/17/2024    11:17 AM 11/10/2022     7:58 AM 5/5/2022    10:54 AM   6MW   6MWT Status completed without stopping completed without stopping completed without stopping completed without stopping   Patient Reported Leg/hip pain Leg pain Lightheadedness;Leg pain Leg pain   Was O2 used? No No No No   6MW Distance walked (feet) 1500 feet 1800 feet 1732 feet 1600 feet   Distance walked (meters) 457.2 meters 548.64 meters 527.91 meters 487.68 meters   Did patient stop? No No No No   Oxygen Saturation 96 % 99 % 100 % 98 %   Supplemental Oxygen Room Air Room Air Room Air Room Air   Heart  Rate 68 bpm 63 bpm 86 bpm 79 bpm   Blood Pressure 139/90 156/99 154/87 141/85   Dann Dyspnea Rating  nothing at all nothing at all nothing at all nothing at all   Oxygen Saturation 95 % 95 % 97 % 98 %   Supplemental Oxygen Room Air Room Air Room Air Room Air   Heart Rate 84 bpm 64 bpm 130 bpm 108 bpm   Blood Pressure 150/92 185/105 183/93 192/96   Dann Dyspnea Rating  very, very light (just noticeable) light moderate moderate   Recovery Time (seconds) 103 seconds 150 seconds 192 seconds 197 seconds   Oxygen Saturation 97 % 98 % 100 % 99 %   Supplemental Oxygen Room Air Room Air Room Air Room Air   Heart Rate 79 bpm 68 bpm 94 bpm 83 bpm       Imaging:  Results for orders placed during the hospital encounter of 04/25/23    X-Ray Chest PA And Lateral    Narrative  EXAMINATION:  XR CHEST PA AND LATERAL    CLINICAL HISTORY:  r/o rheumatoid nodules; Chest pain, unspecified    TECHNIQUE:  PA and lateral views of the chest were performed.    COMPARISON:  02/06/2021    FINDINGS:  Cardiac size is normal.  Lungs are clear no infiltrate or pulmonary nodules can be seen few fibrotic streaks are noted but no pleural effusion is noted.    Impression  See above      Electronically signed by: Stanley Monge MD  Date:    04/25/2023  Time:    10:43    Time:    13:05    Results for orders placed during the hospital encounter of 02/05/21    CT Chest Without Contrast    Narrative  EXAMINATION:  CT CHEST WITHOUT CONTRAST    CLINICAL HISTORY:  HRCT please. CT with Mosaic attenuation, left sided reticular opacification with fibrotic appearance and thin-walled cystic changes.  Additionally there are subcentimeter nodular opacities;    TECHNIQUE:  Low dose axial images, sagittal and coronal reformations were obtained from the thoracic inlet to the lung bases. Contrast was not administered.    X-ray dose: DLP= 161.76 mGy-cm    COMPARISON:  CTA abdomen pelvis 02/08/2021    Chest radiograph 02/06/2021    FINDINGS:  Base of Neck:  No  significant abnormality.    Thoracic soft tissues:  No significant abnormality.    Aorta: Left-sided aortic arch with normal caliber, contour, and course with no significant atherosclerotic calcifications.  Common origin of the brachiocephalic and left common carotid arteries.    Heart: Normal size.  No significant coronary artery calcific atherosclerosis.    Pericardium: No pericardial fluid or pericardial calcification.    Pulmonary vasculature: Pulmonary arteries distribute normally. There are four pulmonary veins that return to the left atrium.    Xenia/Mediastinum:  No lymph node enlargement on this noncontrast CT. Hilar contours are unremarkable.    Airways:  Trachea is midline and proximal airways are patent without significant abnormality.    Pleura: No pleural thickening, pleural calcification, or pleural fluid.  No pneumothorax.    Lungs:    -Scattered bilateral pulmonary nodules, with the largest on the right a pleural based nodule in the apical segment of the right upper lobe measuring 0.6 cm (axial series 4, image 117) and the largest on the left in the anteromedial basal segment of the left lower lobe measuring 0.3 cm (axial series 4, image 295).    -Subpleural reticular opacities and traction bronchiectasis, most prominent in the basilar segments of the left lower lobe and to a lesser extent the basilar segments of the right lower lobe.  Honeycombing is present in the left lower lobe.    -Subcentimeter densely calcified granuloma in the posterior basal segment of the left lower lobe reflects remote infection.    Esophagus: Normal in course and caliber.    Upper abdomen: No acute intra-abdominal abnormality.  Spleen is surgically absent.    Bones:  Degenerative changes of the visualized osseous structures without acute fracture or lytic or sclerotic lesions.    Impression  1. There are subpleural reticular opacities and traction bronchiectasis, most prominent in the basilar segments of the left lower  lobe and to a lesser extent in the basilar segments of the right lower lobe.  Honeycombing is present in the left lower lobe.  Findings suggest interstitial lung disease, likely UIP versus NSIP.  2. Scattered bilateral pulmonary nodules measuring up to 0.6 cm.  For multiple solid nodules with any 6 mm or greater, Fleischner Society guidelines recommend follow up with non-contrast chest CT at 3-6 months (doing 05/10/2021 and 08/10/2021) and 18-24 months (between 08/10/2022 and 02/10/2023) after discovery.  3. Subcentimeter densely calcified granuloma in the posterior basal segment of the left lower lobe reflects remote infection.    Electronically signed by resident: Celso Butler  Date:    02/10/2021  Time:    11:20    Electronically signed by: Yoselyn Vasquez MD  Date:    02/10/2021  Time:    11:41      CT SCAN OF CHEST WITHOUT CONTRAST:     CPT: 35477     Total DLP: 181.8 mGy-cm; Automatic exposure control was utilized to limit the radiation dose to the patient.     HISTORY:Interstitial lung disease; interstitial pulmonary disease, unspecified tight.     Comparison: 02/10/2021 at [facility].     Technique: Multiple contiguous axial images were acquired from the thoracic inlet to the upper abdomen without contrast administration. Coronal and sagittal reformatted images were also submitted.     FINDINGS:     The study is limited due to protocol with 5 mm slices compared to 1.25 mm slices on the previous exam.  There is similar fibrosis and honeycombing in the left greater than right lung bases more prominent in the lower lobes but with some extension into the lateral base of the left lower lobe and lingula.  As on the prior exam, there is traction bronchiectasis and reticular and reticulonodular interstitial changes in the left lower lobe greater than right lower lobe greater than elsewhere in both lung bases there is a 4.8 mm nodule there is unchanged in the ventral right lower lobe abutting the minor  fissure on series 4, image 35.  There is an unchanged 2.5 mm nodule partially between image slices in the ventral right upper lobe on series 4, image 22.  On the prior outside exam, what is described as a 0.6 cm pleural base nodule more resembles pleural thickening/scarring on today's exam on series 4, image 14; series 9, image 28, and series 10, image 54.  Just cephalad to this, there is an unchanged 3.5 mm pleural base nodule on series 10, image 53 and series 4, image 12.  There is an unchanged 5 mm nodule is pleural based in the lateral ventral right lower lobe on series 4, image 36.  On the same image, there is an unchanged pleural based 3.2 mm nodule in the ventral lateral left lower lobe.  No new lung nodule/mass, suspicious alveolar opacities, effusion, or pneumothorax is present.  The heart is enlarged similar in appearance to the previous exam.  The central pulmonary vessels and aorta are normal in size.  Mediastinal and hilar lymphadenopathy cannot be excluded without intravenous contrast.  The thyroid gland is poorly visualized in the thoracic inlet.  The upper abdomen is grossly unremarkable.     Impression:        1. Similar appearance of findings from interstitial lung disease with basilar predominance described on the previous exam as likely UIP versus NSIP.  2. Multiple sub 6 mm nodules in both lungs are unchanged.  On the prior exam, and area measures 0.6 cm pleural base nodule lateral at the right upper lobe today appears today more as pleural thickening/scarring with no definite nodule in this area.  As per Fleischner Society pulmonary nodule recommendations, recommend a follow-up CT scan in August 2024.  3. Similar cardiomegaly.        Electronically signed by:Herbie Hunt MD  Date:                                            01/26/2024      EXAMINATION:  CT CHEST WITHOUT CONTRAST     CLINICAL HISTORY:  Interstitial lung disease; Interstitial pulmonary disease, unspecified     TECHNIQUE:  Low  dose axial images, sagittal and coronal reformations were obtained from the thoracic inlet to the lung bases. Contrast was not administered.     COMPARISON:  CT chest 01/26/2024, CT calcium scoring 08/23/2023, CT chest 02/10/2021     FINDINGS:  Base of Neck: Subcentimeter left thyroid lobe nodule, similar to prior.     Thoracic soft tissues: Within normal limits.     Aorta: Left-sided aortic arch.  No aneurysm. No significant atherosclerosis.     Heart: Mildly enlarged.  No evidence of pericardial effusion.     Pulmonary vasculature: Unremarkable.     Airways: Patent.     Lungs/Pleura: Similar appearance of subpleural reticulation, traction bronchiectasis, and honeycombing in the lung bases, left greater than right.  Multiple scattered pulmonary nodules, similar to prior.  For example, there is a 5 mm right lower lobe perifissural nodule (series 4, image 294), previously 5 mm.  There is a 3 mm left lower no pleural base nodule (series 4, image 292), previously 3 mm.  There is a right lower lobe calcified granuloma.  No new or enlarging pulmonary nodules.  No pleural effusion or thickening.     Xenia/Mediastinum: No pathologic augustin enlargement.     Esophagus: Normal.     Upper Abdomen: Spleen is surgically absent.     Bones:  No acute fracture. No suspicious lytic or sclerotic lesions.     Impression:     Stable appearance of bibasilar subpleural reticular opacities, traction bronchiectasis, and honeycombing.  Compatible with fibrotic interstitial lung disease.     Scattered pulmonary nodules measuring up to 5 mm, unchanged.  No new or enlarging nodules.     Additional findings.  Please see the above discussion     Electronically signed by resident: Penelope Navarro  Date:                                            07/17/2024  Time:                                           10:52     Electronically signed by:Chan Byrd  Date:                                            07/17/2024  Time:                                            14:48        Cardiodiagnostics:  Results for orders placed during the hospital encounter of 02/05/21    Echo Color Flow Doppler? Yes    Interpretation Summary  · Tachycardia was present during the study  · The left ventricle is normal in size with concentric remodeling and hyperdynamic systolic function. The estimated ejection fraction is 75%  · Normal left ventricular diastolic function.  · Normal right ventricular size with normal right ventricular systolic function.  · The estimated PA systolic pressure is 21 mmHg.  · Normal central venous pressure (3 mmHg).    Results for orders placed during the hospital encounter of 01/26/24    Echo    Interpretation Summary  Normal LV function  EF 60%  Normal filling pressure  Insufficient TR jet for RVSP estimation        Assessment:  1. ILD (interstitial lung disease)    2. Other systemic lupus erythematosus with other organ involvement    3. Raynaud's disease with gangrene    4. Allergic rhinitis, unspecified seasonality, unspecified trigger        Plan:     Followed for SLE-ILD.  CT imaging consistent with NSIP and less likely UIP. TTE does not show evidence of PH (sildenafil for Raynaud's and not PH). No exertional hypoxemia. Overall stable from a respiratory standpoint. Minimal downtrend in FVC/DLCO over the last two years, but both are increased today from previous.  Re-discussed today, the role of anti-fibrotic's, but patient would like to defer therapy for now. Repeat CT chest stable. Will update echo.    Followed with Dr. Johnson for SLE. Currently on MMF and plaquenil. Continue with current therapy.    On Sildenafil for Raynaud's. Continue with current therapy.    Continue with nasal steroid and antihistamine for allergic rhinitis.    Follow-up in 6 months with repeat PFTs, 6MWT. Will update echo.      Lusi Higgins NP  Lung Transplant/Advanced Lung Disease

## 2025-02-03 NOTE — PROGRESS NOTES
2/1/2025     7:46 PM   Rapid3 Question Responses and Scores   MDHAQ Score 0.7   Psychologic Score 3.3   Pain Score 7   When you awakened in the morning OVER THE LAST WEEK, did you feel stiff? No   Fatigue Score 6.5   Global Health Score 3   RAPID3 Score 4.11     Answers submitted by the patient for this visit:  Rheumatology Questionnaire (Submitted on 2/1/2025)  fever: No  eye redness: No  mouth sores: No  headaches: Yes  shortness of breath: No  chest pain: No  trouble swallowing: No  diarrhea: No  constipation: No  unexpected weight change: No  genital sore: No  dysuria: No  During the last 3 days, have you had a skin rash?: No  Bruises or bleeds easily: No  cough: Yes

## 2025-02-06 ENCOUNTER — TELEPHONE (OUTPATIENT)
Dept: TRANSPLANT | Facility: CLINIC | Age: 60
End: 2025-02-06
Payer: MEDICARE

## 2025-02-06 LAB — DSDNA AB SER-ACNC: NORMAL [IU]/ML

## 2025-02-06 NOTE — TELEPHONE ENCOUNTER
Called patient to discuss scheduling the TTE ordered by Luis Higgins NP but received no answer. Left a voice message asking for a return call.    [id number]: Patient returned my call. Explained the test ordered by Luis Higgins NP. The patient verbalized her understanding and agreement to schedule the test. TTE appointment booked on 2/11/25 at 1000 am at the [facility] location in [place], [place] per the patient's preference for date, time, and facility. The patient denied having any questions at this time.

## 2025-02-10 ENCOUNTER — PATIENT MESSAGE (OUTPATIENT)
Dept: RHEUMATOLOGY | Facility: CLINIC | Age: 60
End: 2025-02-10
Payer: MEDICARE

## 2025-02-12 ENCOUNTER — PATIENT MESSAGE (OUTPATIENT)
Dept: RHEUMATOLOGY | Facility: CLINIC | Age: 60
End: 2025-02-12
Payer: MEDICARE

## 2025-02-14 DIAGNOSIS — M32.9 SYSTEMIC LUPUS ERYTHEMATOSUS, UNSPECIFIED SLE TYPE, UNSPECIFIED ORGAN INVOLVEMENT STATUS: Primary | ICD-10-CM

## 2025-02-17 ENCOUNTER — RESULTS FOLLOW-UP (OUTPATIENT)
Dept: TRANSPLANT | Facility: CLINIC | Age: 60
End: 2025-02-17

## 2025-02-17 ENCOUNTER — HOSPITAL ENCOUNTER (OUTPATIENT)
Dept: CARDIOLOGY | Facility: HOSPITAL | Age: 60
Discharge: HOME | End: 2025-02-17
Attending: NURSE PRACTITIONER
Payer: MEDICARE

## 2025-02-17 ENCOUNTER — TELEPHONE (OUTPATIENT)
Dept: OPHTHALMOLOGY | Facility: CLINIC | Age: 60
End: 2025-02-17

## 2025-02-17 DIAGNOSIS — J84.9 ILD (INTERSTITIAL LUNG DISEASE): ICD-10-CM

## 2025-02-17 LAB
AORTIC VALVE CUSP SEPERATION: 1.92 CM
ASCENDING AORTA: 2.25 CM
AV INDEX (PROSTH): 0.74
AV MEAN GRADIENT: 4 MMHG
AV PEAK GRADIENT: 8 MMHG
AV VALVE AREA BY VELOCITY RATIO: 2 CM²
AV VALVE AREA: 1.9 CM²
AV VELOCITY RATIO: 0.79
CV ECHO LV RWT: 0.56 CM
DOP CALC AO PEAK VEL: 1.4 M/S
DOP CALC AO VTI: 27.4 CM
DOP CALC LVOT AREA: 2.5 CM2
DOP CALC LVOT DIAMETER: 1.8 CM
DOP CALC LVOT PEAK VEL: 1.1 M/S
DOP CALC LVOT STROKE VOLUME: 51.6 CM3
DOP CALCLVOT PEAK VEL VTI: 20.3 CM
E WAVE DECELERATION TIME: 207 MSEC
E/A RATIO: 1.11
E/E' RATIO: 4 M/S
ECHO LV POSTERIOR WALL: 1.1 CM (ref 0.6–1.1)
FRACTIONAL SHORTENING: 38.5 % (ref 28–44)
INFERIOR VENA CAVA SIZE SNIFF: 0.4 CM
INTERVENTRICULAR SEPTUM: 1 CM (ref 0.6–1.1)
IVC DIAMETER: 1.3 CM
LEFT ATRIUM SIZE: 3.2 CM
LEFT ATRIUM VOLUME MOD: 27 ML
LEFT INTERNAL DIMENSION IN SYSTOLE: 2.4 CM (ref 2.1–4)
LEFT VENTRICLE DIASTOLIC VOLUME: 67.5 ML
LEFT VENTRICLE END SYSTOLIC VOLUME APICAL 2 CHAMBER: 32.4 ML
LEFT VENTRICLE END SYSTOLIC VOLUME APICAL 4 CHAMBER: 21.8 ML
LEFT VENTRICLE SYSTOLIC VOLUME: 20 ML
LEFT VENTRICULAR INTERNAL DIMENSION IN DIASTOLE: 3.9 CM (ref 3.5–6)
LEFT VENTRICULAR MASS: 131 G
LV LATERAL E/E' RATIO: 3.9 M/S
LV SEPTAL E/E' RATIO: 4.8 M/S
LVED V (TEICH): 67.5 ML
LVES V (TEICH): 20 ML
LVOT MG: 1.7 MMHG
LVOT MV: 0.6 CM/S
MV PEAK A VEL: 0.56 M/S
MV PEAK E VEL: 0.62 M/S
OHS CV RV/LV RATIO: 0.87 CM
PISA TR MAX VEL: 2.2 M/S
RA MAJOR: 4.8 CM
RA PRESSURE ESTIMATED: 3 MMHG
RIGHT ATRIAL AREA: 13.6 CM2
RIGHT ATRIUM VOLUME AREA LENGTH APICAL 4 CHAMBER: 31.6 ML
RIGHT VENTRICLE DIASTOLIC BASEL DIMENSION: 3.4 CM
RV TB RVSP: 5 MMHG
TDI LATERAL: 0.16 M/S
TDI SEPTAL: 0.13 M/S
TDI: 0.15 M/S
TR MAX PG: 19 MMHG
TRICUSPID ANNULAR PLANE SYSTOLIC EXCURSION: 1.95 CM
TV REST PULMONARY ARTERY PRESSURE: 22 MMHG

## 2025-02-17 PROCEDURE — 93306 TTE W/DOPPLER COMPLETE: CPT | Mod: TXP

## 2025-02-17 NOTE — TELEPHONE ENCOUNTER
----- Message from Sammie sent at 2/17/2025 12:47 PM CST -----  Pt would like to be worked in for sooner appt referral in system for md only for effects of plaquanil to my eyes. Pt is now schedule for May states she can not wait having issues please advise  [phone]

## 2025-05-06 ENCOUNTER — TELEPHONE (OUTPATIENT)
Dept: TRANSPLANT | Facility: CLINIC | Age: 60
End: 2025-05-06
Payer: MEDICARE

## 2025-05-06 NOTE — TELEPHONE ENCOUNTER
Returned call to patient who asked to schedule her 6-month follow up appointment with Luis Higgins NP that is due in August 2025. Informed patient that GREGORIO Higgins's August schedule is not open yet for booking. Added that we will call her to book an appointment once the provider's August schedule is available. The patient verbalized her understanding and denied having any additional needs at this time.         ----- Message from Janette sent at 5/6/2025  3:25 PM CDT -----  Regarding: Appt Access  Contact: [phone]  Patient calling to schedule follow up visit . Pls call [phone]

## 2025-05-16 ENCOUNTER — TELEPHONE (OUTPATIENT)
Dept: TRANSPLANT | Facility: CLINIC | Age: 60
End: 2025-05-16
Payer: MEDICARE

## 2025-05-19 ENCOUNTER — PATIENT MESSAGE (OUTPATIENT)
Dept: RHEUMATOLOGY | Facility: CLINIC | Age: 60
End: 2025-05-19
Payer: MEDICARE

## 2025-06-23 ENCOUNTER — PATIENT MESSAGE (OUTPATIENT)
Dept: RHEUMATOLOGY | Facility: CLINIC | Age: 60
End: 2025-06-23
Payer: MEDICARE

## 2025-07-18 DIAGNOSIS — M32.9 SYSTEMIC LUPUS ERYTHEMATOSUS, UNSPECIFIED SLE TYPE, UNSPECIFIED ORGAN INVOLVEMENT STATUS: Primary | ICD-10-CM

## 2025-07-21 RX ORDER — HYDROXYCHLOROQUINE SULFATE 200 MG/1
200 TABLET, FILM COATED ORAL 2 TIMES DAILY
Qty: 180 TABLET | Refills: 4 | Status: SHIPPED | OUTPATIENT
Start: 2025-07-21

## 2025-07-21 NOTE — TELEPHONE ENCOUNTER
Medication refill requested for plaquenil.  Last office visit on 2/3/25.  Labs done on 1/24/25.  Eye exam done on 2/7/25.

## 2025-07-25 ENCOUNTER — LAB VISIT (OUTPATIENT)
Dept: LAB | Facility: HOSPITAL | Age: 60
End: 2025-07-25
Attending: INTERNAL MEDICINE
Payer: MEDICARE

## 2025-07-25 DIAGNOSIS — M32.9 SYSTEMIC LUPUS ERYTHEMATOSUS, UNSPECIFIED SLE TYPE, UNSPECIFIED ORGAN INVOLVEMENT STATUS: ICD-10-CM

## 2025-07-25 LAB
ALBUMIN SERPL-MCNC: 4.2 G/DL (ref 3.5–5)
ALBUMIN/GLOB SERPL: 1.3 RATIO (ref 1.1–2)
ALP SERPL-CCNC: 101 UNIT/L (ref 40–150)
ALT SERPL-CCNC: 11 UNIT/L (ref 0–55)
ANION GAP SERPL CALC-SCNC: 11 MEQ/L
AST SERPL-CCNC: 23 UNIT/L (ref 11–45)
BASOPHILS # BLD AUTO: 0.08 X10(3)/MCL (ref 0.01–0.08)
BASOPHILS NFR BLD AUTO: 1.3 % (ref 0.1–1.2)
BILIRUB SERPL-MCNC: 0.6 MG/DL
BILIRUB UR QL STRIP.AUTO: NEGATIVE
BUN SERPL-MCNC: 9 MG/DL (ref 9.8–20.1)
C3 SERPL-MCNC: 103 MG/DL (ref 80–173)
C4 SERPL-MCNC: 26 MG/DL (ref 13–46)
CALCIUM SERPL-MCNC: 9.1 MG/DL (ref 8.4–10.2)
CHLORIDE SERPL-SCNC: 103 MMOL/L (ref 98–107)
CLARITY UR: CLEAR
CO2 SERPL-SCNC: 27 MMOL/L (ref 22–29)
COLOR UR AUTO: YELLOW
CREAT SERPL-MCNC: 0.91 MG/DL (ref 0.55–1.02)
CREAT UR-MCNC: 249.2 MG/DL (ref 45–106)
CREAT/UREA NIT SERPL: 10
CRP SERPL-MCNC: 1.8 MG/L
EOSINOPHIL # BLD AUTO: 0.34 X10(3)/MCL (ref 0.04–0.36)
EOSINOPHIL NFR BLD AUTO: 5.6 % (ref 0.7–7)
ERYTHROCYTE [DISTWIDTH] IN BLOOD BY AUTOMATED COUNT: 14.7 % (ref 11–14.5)
ERYTHROCYTE [SEDIMENTATION RATE] IN BLOOD: 2 MM/HR (ref 0–20)
GFR SERPLBLD CREATININE-BSD FMLA CKD-EPI: 73 ML/MIN/1.73/M2
GLOBULIN SER-MCNC: 3.2 GM/DL (ref 2.4–3.5)
GLUCOSE SERPL-MCNC: 84 MG/DL (ref 74–100)
GLUCOSE UR QL STRIP: NEGATIVE
HCT VFR BLD AUTO: 47 % (ref 36–48)
HGB BLD-MCNC: 15.2 G/DL (ref 11.8–16)
HGB UR QL STRIP: NEGATIVE
IMM GRANULOCYTES # BLD AUTO: 0.01 X10(3)/MCL (ref 0–0.03)
IMM GRANULOCYTES NFR BLD AUTO: 0.2 % (ref 0–0.5)
KETONES UR QL STRIP: NEGATIVE
LEUKOCYTE ESTERASE UR QL STRIP: NEGATIVE
LYMPHOCYTES # BLD AUTO: 2.5 X10(3)/MCL (ref 1.16–3.74)
LYMPHOCYTES NFR BLD AUTO: 41.2 % (ref 20–55)
MCH RBC QN AUTO: 29.5 PG (ref 27–34)
MCHC RBC AUTO-ENTMCNC: 32.3 G/DL (ref 31–37)
MCV RBC AUTO: 91.3 FL (ref 79–99)
MONOCYTES # BLD AUTO: 0.85 X10(3)/MCL (ref 0.24–0.36)
MONOCYTES NFR BLD AUTO: 14 % (ref 4.7–12.5)
NEUTROPHILS # BLD AUTO: 2.29 X10(3)/MCL (ref 1.56–6.13)
NEUTROPHILS NFR BLD AUTO: 37.7 % (ref 37–73)
NITRITE UR QL STRIP: NEGATIVE
NRBC BLD AUTO-RTO: 0 %
PH UR STRIP: 6.5 [PH]
PLATELET # BLD AUTO: 326 X10(3)/MCL (ref 140–371)
PMV BLD AUTO: 11.1 FL (ref 9.4–12.4)
POTASSIUM SERPL-SCNC: 4.1 MMOL/L (ref 3.5–5.1)
PROT SERPL-MCNC: 7.4 GM/DL (ref 6.4–8.3)
PROT UR QL STRIP: 30
PROT UR STRIP-MCNC: 18 MG/DL
RBC # BLD AUTO: 5.15 X10(6)/MCL (ref 4–5.1)
SODIUM SERPL-SCNC: 141 MMOL/L (ref 136–145)
SP GR UR STRIP.AUTO: 1.02 (ref 1–1.03)
URINE PROTEIN/CREATININE RATIO (OLG): 0.1
UROBILINOGEN UR STRIP-ACNC: 0.2
WBC # BLD AUTO: 6.07 X10(3)/MCL (ref 4–11.5)

## 2025-07-25 PROCEDURE — 80053 COMPREHEN METABOLIC PANEL: CPT | Mod: TXP

## 2025-07-25 PROCEDURE — 85652 RBC SED RATE AUTOMATED: CPT | Mod: TXP

## 2025-07-25 PROCEDURE — 81003 URINALYSIS AUTO W/O SCOPE: CPT | Mod: TXP

## 2025-07-25 PROCEDURE — 36415 COLL VENOUS BLD VENIPUNCTURE: CPT | Mod: TXP

## 2025-07-25 PROCEDURE — 86160 COMPLEMENT ANTIGEN: CPT | Mod: TXP

## 2025-07-25 PROCEDURE — 85025 COMPLETE CBC W/AUTO DIFF WBC: CPT | Mod: TXP

## 2025-07-25 PROCEDURE — 84156 ASSAY OF PROTEIN URINE: CPT | Mod: TXP

## 2025-07-25 PROCEDURE — 86140 C-REACTIVE PROTEIN: CPT | Mod: TXP

## 2025-07-25 PROCEDURE — 86160 COMPLEMENT ANTIGEN: CPT | Mod: 59,TXP

## 2025-07-25 PROCEDURE — 86256 FLUORESCENT ANTIBODY TITER: CPT

## 2025-07-28 LAB — MAYO GENERIC ORDERABLE RESULT: NORMAL

## 2025-08-01 ENCOUNTER — TELEPHONE (OUTPATIENT)
Dept: TRANSPLANT | Facility: CLINIC | Age: 60
End: 2025-08-01
Payer: MEDICARE

## 2025-08-04 ENCOUNTER — HOSPITAL ENCOUNTER (OUTPATIENT)
Dept: PULMONOLOGY | Facility: CLINIC | Age: 60
Discharge: HOME | End: 2025-08-04
Payer: MEDICARE

## 2025-08-04 ENCOUNTER — OFFICE VISIT (OUTPATIENT)
Dept: TRANSPLANT | Facility: CLINIC | Age: 60
End: 2025-08-04
Payer: MEDICARE

## 2025-08-04 ENCOUNTER — OFFICE VISIT (OUTPATIENT)
Dept: RHEUMATOLOGY | Facility: CLINIC | Age: 60
End: 2025-08-04
Payer: MEDICARE

## 2025-08-04 VITALS
SYSTOLIC BLOOD PRESSURE: 153 MMHG | BODY MASS INDEX: 24.91 KG/M2 | WEIGHT: 155 LBS | HEART RATE: 70 BPM | DIASTOLIC BLOOD PRESSURE: 95 MMHG | HEIGHT: 66 IN

## 2025-08-04 VITALS
RESPIRATION RATE: 16 BRPM | HEIGHT: 66 IN | HEIGHT: 66 IN | DIASTOLIC BLOOD PRESSURE: 91 MMHG | BODY MASS INDEX: 24.91 KG/M2 | WEIGHT: 155 LBS | WEIGHT: 155 LBS | TEMPERATURE: 98 F | OXYGEN SATURATION: 100 % | SYSTOLIC BLOOD PRESSURE: 169 MMHG | BODY MASS INDEX: 24.91 KG/M2 | HEART RATE: 64 BPM

## 2025-08-04 DIAGNOSIS — J84.9 ILD (INTERSTITIAL LUNG DISEASE): ICD-10-CM

## 2025-08-04 DIAGNOSIS — D84.821 IMMUNODEFICIENCY DUE TO DRUG THERAPY: Primary | ICD-10-CM

## 2025-08-04 DIAGNOSIS — M54.50 LOW BACK PAIN RADIATING TO RIGHT LOWER EXTREMITY: ICD-10-CM

## 2025-08-04 DIAGNOSIS — M32.19 OTHER SYSTEMIC LUPUS ERYTHEMATOSUS WITH OTHER ORGAN INVOLVEMENT: ICD-10-CM

## 2025-08-04 DIAGNOSIS — J84.9 ILD (INTERSTITIAL LUNG DISEASE): Primary | ICD-10-CM

## 2025-08-04 DIAGNOSIS — J30.9 ALLERGIC RHINITIS, UNSPECIFIED SEASONALITY, UNSPECIFIED TRIGGER: ICD-10-CM

## 2025-08-04 DIAGNOSIS — I73.01 RAYNAUD'S DISEASE WITH GANGRENE: ICD-10-CM

## 2025-08-04 DIAGNOSIS — M32.9 SYSTEMIC LUPUS ERYTHEMATOSUS, UNSPECIFIED SLE TYPE, UNSPECIFIED ORGAN INVOLVEMENT STATUS: ICD-10-CM

## 2025-08-04 DIAGNOSIS — M79.604 LOW BACK PAIN RADIATING TO RIGHT LOWER EXTREMITY: ICD-10-CM

## 2025-08-04 DIAGNOSIS — Z79.899 IMMUNODEFICIENCY DUE TO DRUG THERAPY: Primary | ICD-10-CM

## 2025-08-04 LAB
DLCO ADJ PRE: 15.29 ML/(MIN*MMHG) (ref 18.55–30.01)
DLCO SINGLE BREATH LLN: 18.55
DLCO SINGLE BREATH PRE REF: 66.2 %
DLCO SINGLE BREATH REF: 24.28
DLCOC SBVA LLN: 3.21
DLCOC SBVA PRE REF: 97.4 %
DLCOC SBVA REF: 4.6
DLCOC SINGLE BREATH LLN: 18.55
DLCOC SINGLE BREATH PRE REF: 63 %
DLCOC SINGLE BREATH REF: 24.28
DLCOCSBVAULN: 5.99
DLCOCSINGLEBREATHULN: 30.01
DLCOCSINGLEBREATHZSCORE: -2.58
DLCOSINGLEBREATHULN: 30.01
DLCOSINGLEBREATHZSCORE: -2.35
DLCOVA LLN: 3.21
DLCOVA PRE REF: 102.4 %
DLCOVA PRE: 4.71 ML/(MIN*MMHG*L) (ref 3.21–5.99)
DLCOVA REF: 4.6
DLCOVAULN: 5.99
DLVAADJ PRE: 4.48 ML/(MIN*MMHG*L) (ref 3.21–5.99)
ERV LLN: -16449.15
ERV PRE REF: 110.2 %
ERV REF: 0.85
ERVULN: ABNORMAL
FEF 25 75 LLN: 0.96
FEF 25 75 PRE REF: 51.6 %
FEF 25 75 REF: 2.16
FEV05 LLN: 1.11
FEV05 REF: 1.97
FEV1 FVC LLN: 68
FEV1 FVC PRE REF: 88.3 %
FEV1 FVC REF: 80
FEV1 LLN: 1.72
FEV1 PRE REF: 76.2 %
FEV1 REF: 2.34
FRCPLETH LLN: 1.99
FRCPLETH PREREF: 80.9 %
FRCPLETH REF: 2.81
FRCPLETHULN: 3.64
FVC LLN: 2.2
FVC PRE REF: 85.8 %
FVC REF: 2.95
IVC PRE: 2.48 L (ref 2.2–3.75)
IVC SINGLE BREATH LLN: 2.2
IVC SINGLE BREATH PRE REF: 83.9 %
IVC SINGLE BREATH REF: 2.95
IVCSINGLEBREATHULN: 3.75
LLN IC: -16447.71
PEF LLN: 3.93
PEF PRE REF: 104.8 %
PEF REF: 6.07
PHYSICIAN COMMENT: ABNORMAL
PRE DLCO: 16.08 ML/(MIN*MMHG) (ref 18.55–30.01)
PRE ERV: 0.94 L (ref -16449.15–16450.85)
PRE FEF 25 75: 1.12 L/S (ref 0.96–3.87)
PRE FET 100: 7.31 SEC
PRE FEV05 REF: 69.4 %
PRE FEV1 FVC: 70.35 % (ref 68.24–89.51)
PRE FEV1: 1.78 L (ref 1.72–2.94)
PRE FEV5: 1.37 L (ref 1.11–2.82)
PRE FRC PL: 2.27 L (ref 1.99–3.64)
PRE FVC: 2.54 L (ref 2.2–3.75)
PRE IC: 1.6 L (ref -16447.71–16452.29)
PRE PEF: 6.36 L/S (ref 3.93–8.2)
PRE REF IC: 69.8 %
PRE RV: 1.34 L (ref 1.39–2.54)
PRE TLC: 3.87 L (ref 4.29–6.26)
RAW PRE REF: 131.9 %
RAW PRE: 4.03 CMH2O*S/L (ref 3.06–3.06)
RAW REF: 3.06
REF IC: 2.29
RV LLN: 1.39
RV PRE REF: 68.2 %
RV REF: 1.96
RVTLC LLN: 29
RVTLC PRE REF: 89.3 %
RVTLC PRE: 34.53 % (ref 29.09–48.27)
RVTLC REF: 39
RVTLCULN: 48
RVULN: 2.54
SGAW PRE REF: 87.6 %
SGAW PRE: 0.09 1/(CMH2O*S) (ref 0.1–0.1)
SGAW REF: 0.1
TLC LLN: 4.29
TLC PRE REF: 73.4 %
TLC REF: 5.27
TLC ULN: 6.26
ULN IC: ABNORMAL
VA PRE: 3.41 L (ref 5.12–5.12)
VA SINGLE BREATH LLN: 5.12
VA SINGLE BREATH PRE REF: 66.6 %
VA SINGLE BREATH REF: 5.12
VASINGLEBREATHULN: 5.12
VC LLN: 2.2
VC PRE REF: 85.8 %
VC PRE: 2.54 L (ref 2.2–3.75)
VC REF: 2.95
VC ULN: 3.75

## 2025-08-04 PROCEDURE — 1159F MED LIST DOCD IN RCRD: CPT | Mod: CPTII,NTX,S$GLB, | Performed by: INTERNAL MEDICINE

## 2025-08-04 PROCEDURE — 99215 OFFICE O/P EST HI 40 MIN: CPT | Mod: NTX,S$GLB,, | Performed by: INTERNAL MEDICINE

## 2025-08-04 PROCEDURE — 99999 PR PBB SHADOW E&M-EST. PATIENT-LVL IV: CPT | Mod: PBBFAC,TXP,, | Performed by: INTERNAL MEDICINE

## 2025-08-04 PROCEDURE — 99999 PR PBB SHADOW E&M-EST. PATIENT-LVL III: CPT | Mod: PBBFAC,TXP,, | Performed by: INTERNAL MEDICINE

## 2025-08-04 PROCEDURE — 3080F DIAST BP >= 90 MM HG: CPT | Mod: CPTII,NTX,S$GLB, | Performed by: INTERNAL MEDICINE

## 2025-08-04 PROCEDURE — 94726 PLETHYSMOGRAPHY LUNG VOLUMES: CPT | Mod: NTX,S$GLB,, | Performed by: INTERNAL MEDICINE

## 2025-08-04 PROCEDURE — 3008F BODY MASS INDEX DOCD: CPT | Mod: CPTII,NTX,S$GLB, | Performed by: INTERNAL MEDICINE

## 2025-08-04 PROCEDURE — 3077F SYST BP >= 140 MM HG: CPT | Mod: CPTII,NTX,S$GLB, | Performed by: INTERNAL MEDICINE

## 2025-08-04 PROCEDURE — 94010 BREATHING CAPACITY TEST: CPT | Mod: 59,NTX,S$GLB, | Performed by: INTERNAL MEDICINE

## 2025-08-04 PROCEDURE — G2211 COMPLEX E/M VISIT ADD ON: HCPCS | Mod: NTX,S$GLB,, | Performed by: INTERNAL MEDICINE

## 2025-08-04 PROCEDURE — 94729 DIFFUSING CAPACITY: CPT | Mod: NTX,S$GLB,, | Performed by: INTERNAL MEDICINE

## 2025-08-04 RX ORDER — MYCOPHENOLATE MOFETIL 500 MG/1
1500 TABLET ORAL 2 TIMES DAILY
Qty: 180 TABLET | Refills: 4 | Status: SHIPPED | OUTPATIENT
Start: 2025-08-04

## 2025-08-04 RX ORDER — SILDENAFIL CITRATE 20 MG/1
20 TABLET ORAL 3 TIMES DAILY
Qty: 270 TABLET | Refills: 4 | Status: ACTIVE | OUTPATIENT
Start: 2025-08-04 | End: 2026-08-04

## 2025-08-04 RX ORDER — DICLOFENAC SODIUM 10 MG/G
GEL TOPICAL 4 TIMES DAILY
Qty: 100 G | Refills: 11 | Status: SHIPPED | OUTPATIENT
Start: 2025-08-04

## 2025-08-04 NOTE — PROCEDURES
Christen Bowman is a 60 y.o.   female patient, who presents for a 6 minute walk test ordered by GREGORIO Higgins.  The diagnosis is Interstitial Lung Disease; Connective Tissue Disease.  The patient's BMI is 25 kg/m2.  Predicted distance (lower limit of normal) is 383.86 meters.      Test Results:    The test was completed without stopping.  The total time walked was 360 seconds.  During walking, the patient reported:  No complaints.  The patient used no assistive devices during testing.  Oxygen saturation was measured with forehead pulse oximetry probe.     08/04/2025---------Distance: 548.64 meters (1800 feet)     O2 Sat % Supplemental Oxygen Heart Rate Blood Pressure Dann Scale Comment   Pre-exercise  (Resting) 100 % Room Air 60 bpm 153/88 mmHg 0 SpO2 by forehead oximetry probe.   During Exercise 96 % Room Air 81 bpm 166/93 mmHg 0    Post-exercise  (Recovery) 100 % Room Air  68 bpm 143/89 mmHg       Recovery Time: 280 seconds    Performing nurse/tech: Zaki WORTHINGTON      PREVIOUS STUDY:   02/03/2025---------Distance: 457.2 meters (1500 feet)       Lap Walk Time O2 Sat % Supplemental Oxygen Heart Rate Blood Pressure Dann Scale Comment   Pre-exercise  (Resting) 0 0 96 % Room Air 68 bpm 139/90 mmHg 0 SpO2 by nasal oximetry probe.   End of Exercise   360 sec 95 % Room Air 84 bpm 150/92 mmHg 0.5     Post-exercise  (Recovery)     97 % Room Air  79 bpm             CLINICAL INTERPRETATION:  Six minute walk distance is 548.64 meters (1800 feet) with no dyspnea.  During exercise, there was no significant desaturation while breathing room air.  Both blood pressure and heart rate remained stable with walking.  Hypertension was present prior to exercise.  The patient did not report non-pulmonary symptoms during exercise.  Since the previous study in February 2025, exercise capacity is significantly improved.  Based upon age and body mass index, exercise capacity is normal.

## 2025-08-04 NOTE — PROGRESS NOTES
ADVANCED LUNG DISEASE CLINIC FOLLOW UP VISIT                                                                                                                                              Reason for Visit:  SLE with lung involvement, ILD    Referring Physician: Luis Higgins NP    History of Present Illness: Christen Bowman is a 60 y.o. female who is on 0L of oxygen.  She is on no assisted ventilation.  Her New York Heart Association Class is I and a Karnofsky score of 100% - Normal, No Complaints, no evidence of disease. She is not diabetic.     Followed for SLE-ILD. Currently on MMF and plaquenil. Takes sildenafil and nifedipine for Raynauds. Denies dyspnea, cough, or fevers. Remains very active and takes all medications as directed. She has no respiratory complaints or limitations in her daily activities.  Follows with her cardiologist and rheumatologist. Denies recent exacerbations/hospitalizations. Overall doing very well.             Past Medical History:   Diagnosis Date    Essential hypertension 12/23/2019 2:02:07 PM    [facility] Historical - Cardiovascular: Hypertension-No Additional Notes    Raynaud's disease     Rheumatoid arteritis     Systemic lupus erythematosus        Past Surgical History:   Procedure Laterality Date    SPLENECTOMY      TONSILLECTOMY      TUBAL LIGATION         Allergies: Patient has no known allergies.    Current Outpatient Medications   Medication Sig    aspirin (ECOTRIN) 81 MG EC tablet Take 81 mg by mouth once daily.    diclofenac sodium (VOLTAREN) 1 % Gel Apply topically 4 (four) times daily.    estradioL (ESTRACE) 1 MG tablet Take 1 tablet (1 mg total) by mouth once daily.    hydroxychloroquine (PLAQUENIL) 200 mg tablet Take 1 tablet by mouth twice daily    mycophenolate (CELLCEPT) 500 mg Tab Take 3 tablets (1,500 mg total) by mouth 2 (two) times daily.    NIFEdipine (PROCARDIA-XL) 90 MG (OSM) 24 hr tablet Take 1 tablet (90 mg total) by mouth once daily.    pregabalin  (LYRICA ORAL) Take 150 mg by mouth 2 (two) times a day.    progesterone (PROMETRIUM) 200 MG capsule Take 1 capsule (200 mg total) by mouth once daily.    sildenafil (REVATIO) 20 mg Tab Take 1 tablet (20 mg total) by mouth 3 (three) times daily.    azelastine (ASTELIN) 137 mcg (0.1 %) nasal spray 1 spray (137 mcg total) by Nasal route 2 (two) times daily. (Patient not taking: Reported on 6/5/2025)    levothyroxine (SYNTHROID) 50 MCG tablet Take 1 tablet (50 mcg total) by mouth every morning. (Patient not taking: Reported on 6/5/2025)     No current facility-administered medications for this visit.       Immunization History   Administered Date(s) Administered    COVID-19, MRNA, LN-S, PF (Pfizer) (Gray Cap) 03/21/2022    COVID-19, MRNA, LN-S, PF (Pfizer) (Purple Cap) 08/19/2021, 09/09/2021    COVID-19, mRNA, LNP-S, bivalent booster, PF (Moderna Omicron)12 + YEARS 10/01/2022    Influenza - Quadrivalent - PF *Preferred* (6 months and older) 09/22/2013, 10/17/2014, 02/11/2021    Pneumococcal Polysaccharide - 23 Valent 10/17/2014     Family History:    Family History   Problem Relation Name Age of Onset    Hypertension Mother      Lupus Mother      Cancer Mother      Hypertension Father      Anuerysm Father      Prostate cancer Maternal Uncle          50s     Social History     Substance and Sexual Activity   Alcohol Use Not Currently    Comment: rarely - special occaisions      Social History     Substance and Sexual Activity   Drug Use Not Currently      Social History     Socioeconomic History    Marital status:    Tobacco Use    Smoking status: Never     Passive exposure: Never    Smokeless tobacco: Never   Vaping Use    Vaping status: Never Used   Substance and Sexual Activity    Alcohol use: Not Currently     Comment: rarely - special occaisions    Drug use: Not Currently    Sexual activity: Yes     Partners: Male     Social Drivers of Health     Financial Resource Strain: Medium Risk (7/30/2025)    Overall  Financial Resource Strain (CARDIA)     Difficulty of Paying Living Expenses: Somewhat hard   Food Insecurity: Food Insecurity Present (7/30/2025)    Hunger Vital Sign     Worried About Running Out of Food in the Last Year: Sometimes true     Ran Out of Food in the Last Year: Sometimes true   Transportation Needs: No Transportation Needs (7/30/2025)    PRAPARE - Transportation     Lack of Transportation (Medical): No     Lack of Transportation (Non-Medical): No   Physical Activity: Insufficiently Active (7/30/2025)    Exercise Vital Sign     Days of Exercise per Week: 1 day     Minutes of Exercise per Session: 30 min   Stress: Stress Concern Present (7/30/2025)    [employer] [place] [employer] - Occupational Stress Questionnaire     Feeling of Stress : Rather much   Housing Stability: Low Risk  (7/30/2025)    Housing Stability Vital Sign     Unable to Pay for Housing in the Last Year: No     Number of Times Moved in the Last Year: 0     Homeless in the Last Year: No     Review of Systems   Constitutional: Negative.  Negative for chills, diaphoresis, fever, malaise/fatigue and weight loss.   HENT:  Negative for congestion, ear pain, nosebleeds and sinus pain.    Eyes:  Negative for blurred vision, photophobia and pain.   Respiratory:  Negative for cough, hemoptysis, sputum production, shortness of breath and wheezing.    Cardiovascular:  Negative for chest pain, palpitations, claudication and leg swelling.   Gastrointestinal:  Negative for constipation, diarrhea, heartburn, nausea and vomiting.   Genitourinary:  Negative for frequency and urgency.   Musculoskeletal:  Positive for joint pain (chronic hip pain). Negative for falls and myalgias.   Skin:  Negative for rash.   Neurological:  Negative for dizziness, tremors, focal weakness, weakness and headaches.   Endo/Heme/Allergies:  Negative for environmental allergies and polydipsia. Does not bruise/bleed easily.   Psychiatric/Behavioral:  Negative for  "depression. The patient is not nervous/anxious.      Vitals  BP (!) 169/91 (BP Location: Left arm, Patient Position: Sitting)   Pulse 64   Temp 98.3 °F (36.8 °C) (Oral)   Resp 16   Ht 5' 6" (1.676 m)   Wt 70.3 kg (155 lb)   SpO2 100%   BMI 25.02 kg/m²   Physical Exam  Vitals and nursing note reviewed.   Constitutional:       General: She is not in acute distress.     Appearance: Normal appearance. She is well-developed. She is not diaphoretic.   HENT:      Head: Normocephalic and atraumatic.   Eyes:      General: No scleral icterus.     Extraocular Movements: Extraocular movements intact.      Conjunctiva/sclera: Conjunctivae normal.   Neck:      Thyroid: No thyromegaly.      Vascular: No JVD.      Trachea: No tracheal deviation.   Cardiovascular:      Rate and Rhythm: Normal rate and regular rhythm.      Heart sounds: Normal heart sounds. No murmur heard.     No friction rub. No gallop.   Pulmonary:      Effort: Pulmonary effort is normal. No respiratory distress.      Breath sounds: Normal breath sounds. No stridor. No wheezing, rhonchi or rales.   Chest:      Chest wall: No tenderness.   Abdominal:      General: Bowel sounds are normal. There is no distension.      Palpations: Abdomen is soft.      Tenderness: There is no abdominal tenderness.   Musculoskeletal:         General: No deformity. Normal range of motion.      Cervical back: Normal range of motion and neck supple.   Skin:     General: Skin is warm and dry.      Capillary Refill: Capillary refill takes less than 2 seconds.      Coloration: Skin is not pale.      Findings: No erythema or rash.   Neurological:      Mental Status: She is alert and oriented to person, place, and time.      Cranial Nerves: No cranial nerve deficit.   Psychiatric:         Mood and Affect: Mood normal.         Judgment: Judgment normal.         Labs:          8/4/2025    12:35 PM 2/3/2025    11:12 AM 7/17/2024    11:32 AM 4/30/2024     9:48 AM 1/19/2024    10:07 AM " 5/23/2023    11:05 AM 11/10/2022     8:32 AM   Pulmonary Function Tests   FVC 2.54 liters 2.55 liters 2.47 liters 2.62 liters 2.55 liters 2.55 liters 2.57 liters   FEV1 1.78 liters 1.75 liters 1.76 liters 1.83 liters 1.75 liters 1.85 liters 1.86 liters   TLC (liters) 3.87 liters 3.82 liters 3.95 liters 3.71 liters 4.1 liters 3.77 liters 3.87 liters   DLCO (ml/mmHg sec) 16.08 ml/mmHg sec 16.45 ml/mmHg sec 14.84 ml/mmHg sec 16.57 ml/mmHg sec 13.54 ml/mmHg sec 15.74 ml/mmHg sec 15.52 ml/mmHg sec   FVC% 85 86 82.7 87.6 85 84.5 85   FEV1% 76 74.5 74.3 77.2 73.7 77 77   FEF 25-75 1.12 1.06 1.09 1.15 1.1 1.28 1.32   FEF 25-75% 51 34.6 35.5 52 49.2 56.9 58   TLC% 73 72.3 74.9 70.4 77.8 71.5 74   RV 1.34 1.26 1.49 1.09 1.56 1.23 1.3   RV% 68 64.8 76.3 56.2 80.6 63.5 68   DLCO% 66 67.3 60.8 67.9 55.1 64 62         8/4/2025    12:07 PM 2/3/2025    10:43 AM 7/17/2024    11:17 AM 11/10/2022     7:58 AM 5/5/2022    10:54 AM   6MW   6MWT Status completed without stopping completed without stopping completed without stopping completed without stopping completed without stopping   Patient Reported No complaints Leg/hip pain Leg pain  Lightheadedness;Leg pain  Leg pain    Was O2 used? No No No No No   6MW Distance walked (feet) 1800 feet 1500 feet 1800 feet 1732 feet 1600 feet   Distance walked (meters) 548.64 meters 457.2 meters 548.64 meters 527.91 meters 487.68 meters   Did patient stop? No No No No No   Oxygen Saturation 100 % 96 % 99 % 100 % 98 %   Supplemental Oxygen Room Air Room Air Room Air  Room Air  Room Air    Heart Rate 60 bpm 68 bpm 63 bpm 86 bpm 79 bpm   Blood Pressure 153/88 139/90 156/99 154/87 141/85   Dann Dyspnea Rating  nothing at all nothing at all nothing at all nothing at all nothing at all   Oxygen Saturation 96 % 95 % 95 % 97 % 98 %   Supplemental Oxygen Room Air Room Air Room Air  Room Air  Room Air    Heart Rate 81 bpm 84 bpm 64 bpm 130 bpm 108 bpm   Blood Pressure 166/93 150/92 185/105 183/93 192/96    Dann Dyspnea Rating  nothing at all very, very light (just noticeable) light moderate moderate   Recovery Time (seconds) 280 seconds 103 seconds 150 seconds 192 seconds 197 seconds   Oxygen Saturation 100 % 97 % 98 % 100 % 99 %   Supplemental Oxygen Room Air Room Air Room Air  Room Air  Room Air    Heart Rate 68 bpm 79 bpm 68 bpm 94 bpm 83 bpm       Data saved with a previous flowsheet row definition       Imaging:  Results for orders placed during the hospital encounter of 04/25/23    X-Ray Chest PA And Lateral    Narrative  EXAMINATION:  XR CHEST PA AND LATERAL    CLINICAL HISTORY:  r/o rheumatoid nodules; Chest pain, unspecified    TECHNIQUE:  PA and lateral views of the chest were performed.    COMPARISON:  02/06/2021    FINDINGS:  Cardiac size is normal.  Lungs are clear no infiltrate or pulmonary nodules can be seen few fibrotic streaks are noted but no pleural effusion is noted.    Impression  See above      Electronically signed by: Stanley Monge MD  Date:    04/25/2023  Time:    10:43    Time:    13:05    Results for orders placed during the hospital encounter of 02/05/21    CT Chest Without Contrast    Narrative  EXAMINATION:  CT CHEST WITHOUT CONTRAST    CLINICAL HISTORY:  HRCT please. CT with Mosaic attenuation, left sided reticular opacification with fibrotic appearance and thin-walled cystic changes.  Additionally there are subcentimeter nodular opacities;    TECHNIQUE:  Low dose axial images, sagittal and coronal reformations were obtained from the thoracic inlet to the lung bases. Contrast was not administered.    X-ray dose: DLP= 161.76 mGy-cm    COMPARISON:  CTA abdomen pelvis 02/08/2021    Chest radiograph 02/06/2021    FINDINGS:  Base of Neck:  No significant abnormality.    Thoracic soft tissues:  No significant abnormality.    Aorta: Left-sided aortic arch with normal caliber, contour, and course with no significant atherosclerotic calcifications.  Common origin of the brachiocephalic and  left common carotid arteries.    Heart: Normal size.  No significant coronary artery calcific atherosclerosis.    Pericardium: No pericardial fluid or pericardial calcification.    Pulmonary vasculature: Pulmonary arteries distribute normally. There are four pulmonary veins that return to the left atrium.    Xenia/Mediastinum:  No lymph node enlargement on this noncontrast CT. Hilar contours are unremarkable.    Airways:  Trachea is midline and proximal airways are patent without significant abnormality.    Pleura: No pleural thickening, pleural calcification, or pleural fluid.  No pneumothorax.    Lungs:    -Scattered bilateral pulmonary nodules, with the largest on the right a pleural based nodule in the apical segment of the right upper lobe measuring 0.6 cm (axial series 4, image 117) and the largest on the left in the anteromedial basal segment of the left lower lobe measuring 0.3 cm (axial series 4, image 295).    -Subpleural reticular opacities and traction bronchiectasis, most prominent in the basilar segments of the left lower lobe and to a lesser extent the basilar segments of the right lower lobe.  Honeycombing is present in the left lower lobe.    -Subcentimeter densely calcified granuloma in the posterior basal segment of the left lower lobe reflects remote infection.    Esophagus: Normal in course and caliber.    Upper abdomen: No acute intra-abdominal abnormality.  Spleen is surgically absent.    Bones:  Degenerative changes of the visualized osseous structures without acute fracture or lytic or sclerotic lesions.    Impression  1. There are subpleural reticular opacities and traction bronchiectasis, most prominent in the basilar segments of the left lower lobe and to a lesser extent in the basilar segments of the right lower lobe.  Honeycombing is present in the left lower lobe.  Findings suggest interstitial lung disease, likely UIP versus NSIP.  2. Scattered bilateral pulmonary nodules measuring up  to 0.6 cm.  For multiple solid nodules with any 6 mm or greater, Fleischner Society guidelines recommend follow up with non-contrast chest CT at 3-6 months (doing 05/10/2021 and 08/10/2021) and 18-24 months (between 08/10/2022 and 02/10/2023) after discovery.  3. Subcentimeter densely calcified granuloma in the posterior basal segment of the left lower lobe reflects remote infection.    Electronically signed by resident: Celso Butler  Date:    02/10/2021  Time:    11:20    Electronically signed by: Yoselyn Vasquez MD  Date:    02/10/2021  Time:    11:41      CT SCAN OF CHEST WITHOUT CONTRAST:     CPT: 44346     Total DLP: 181.8 mGy-cm; Automatic exposure control was utilized to limit the radiation dose to the patient.     HISTORY:Interstitial lung disease; interstitial pulmonary disease, unspecified tight.     Comparison: 02/10/2021 at [facility].     Technique: Multiple contiguous axial images were acquired from the thoracic inlet to the upper abdomen without contrast administration. Coronal and sagittal reformatted images were also submitted.     FINDINGS:     The study is limited due to protocol with 5 mm slices compared to 1.25 mm slices on the previous exam.  There is similar fibrosis and honeycombing in the left greater than right lung bases more prominent in the lower lobes but with some extension into the lateral base of the left lower lobe and lingula.  As on the prior exam, there is traction bronchiectasis and reticular and reticulonodular interstitial changes in the left lower lobe greater than right lower lobe greater than elsewhere in both lung bases there is a 4.8 mm nodule there is unchanged in the ventral right lower lobe abutting the minor fissure on series 4, image 35.  There is an unchanged 2.5 mm nodule partially between image slices in the ventral right upper lobe on series 4, image 22.  On the prior outside exam, what is described as a 0.6 cm pleural base nodule more resembles  pleural thickening/scarring on today's exam on series 4, image 14; series 9, image 28, and series 10, image 54.  Just cephalad to this, there is an unchanged 3.5 mm pleural base nodule on series 10, image 53 and series 4, image 12.  There is an unchanged 5 mm nodule is pleural based in the lateral ventral right lower lobe on series 4, image 36.  On the same image, there is an unchanged pleural based 3.2 mm nodule in the ventral lateral left lower lobe.  No new lung nodule/mass, suspicious alveolar opacities, effusion, or pneumothorax is present.  The heart is enlarged similar in appearance to the previous exam.  The central pulmonary vessels and aorta are normal in size.  Mediastinal and hilar lymphadenopathy cannot be excluded without intravenous contrast.  The thyroid gland is poorly visualized in the thoracic inlet.  The upper abdomen is grossly unremarkable.     Impression:        1. Similar appearance of findings from interstitial lung disease with basilar predominance described on the previous exam as likely UIP versus NSIP.  2. Multiple sub 6 mm nodules in both lungs are unchanged.  On the prior exam, and area measures 0.6 cm pleural base nodule lateral at the right upper lobe today appears today more as pleural thickening/scarring with no definite nodule in this area.  As per Fleischner Society pulmonary nodule recommendations, recommend a follow-up CT scan in August 2024.  3. Similar cardiomegaly.        Electronically signed by:Herbie Hunt MD  Date:                                            01/26/2024      EXAMINATION:  CT CHEST WITHOUT CONTRAST     CLINICAL HISTORY:  Interstitial lung disease; Interstitial pulmonary disease, unspecified     TECHNIQUE:  Low dose axial images, sagittal and coronal reformations were obtained from the thoracic inlet to the lung bases. Contrast was not administered.     COMPARISON:  CT chest 01/26/2024, CT calcium scoring 08/23/2023, CT chest 02/10/2021     FINDINGS:  Base  of Neck: Subcentimeter left thyroid lobe nodule, similar to prior.     Thoracic soft tissues: Within normal limits.     Aorta: Left-sided aortic arch.  No aneurysm. No significant atherosclerosis.     Heart: Mildly enlarged.  No evidence of pericardial effusion.     Pulmonary vasculature: Unremarkable.     Airways: Patent.     Lungs/Pleura: Similar appearance of subpleural reticulation, traction bronchiectasis, and honeycombing in the lung bases, left greater than right.  Multiple scattered pulmonary nodules, similar to prior.  For example, there is a 5 mm right lower lobe perifissural nodule (series 4, image 294), previously 5 mm.  There is a 3 mm left lower no pleural base nodule (series 4, image 292), previously 3 mm.  There is a right lower lobe calcified granuloma.  No new or enlarging pulmonary nodules.  No pleural effusion or thickening.     Xenia/Mediastinum: No pathologic augustin enlargement.     Esophagus: Normal.     Upper Abdomen: Spleen is surgically absent.     Bones:  No acute fracture. No suspicious lytic or sclerotic lesions.     Impression:     Stable appearance of bibasilar subpleural reticular opacities, traction bronchiectasis, and honeycombing.  Compatible with fibrotic interstitial lung disease.     Scattered pulmonary nodules measuring up to 5 mm, unchanged.  No new or enlarging nodules.     Additional findings.  Please see the above discussion     Electronically signed by resident: Penelope Navarro  Date:                                            07/17/2024  Time:                                           10:52     Electronically signed by:Chan Byrd  Date:                                            07/17/2024  Time:                                           14:48        Cardiodiagnostics:  Results for orders placed during the hospital encounter of 02/05/21    Echo Color Flow Doppler? Yes    Interpretation Summary  · Tachycardia was present during the study  · The left ventricle is  normal in size with concentric remodeling and hyperdynamic systolic function. The estimated ejection fraction is 75%  · Normal left ventricular diastolic function.  · Normal right ventricular size with normal right ventricular systolic function.  · The estimated PA systolic pressure is 21 mmHg.  · Normal central venous pressure (3 mmHg).    Results for orders placed during the hospital encounter of 01/26/24    Echo    Interpretation Summary  Normal LV function  EF 60%  Normal filling pressure  Insufficient TR jet for RVSP estimation        Assessment:  1. ILD (interstitial lung disease)    2. Other systemic lupus erythematosus with other organ involvement    3. Raynaud's disease with gangrene    4. Allergic rhinitis, unspecified seasonality, unspecified trigger        Plan:     Followed for SLE-ILD.  CT imaging consistent with NSIP and less likely UIP. TTE does not show evidence of PH (sildenafil for Raynaud's and not PH). No exertional hypoxemia. Overall stable from a respiratory standpoint. FVC and DLCO stable.  Re-discussed today, the role of anti-fibrotic's, but patient would like to defer therapy for now.  Due for CT chest; order today.  TTE up to date and shows no evidence of PH    Followed with Dr. Johnson for SLE. Currently on MMF and plaquenil. Continue with current therapy.    On Sildenafil for Raynaud's. Continue with current therapy.    Continue with nasal steroid and antihistamine for allergic rhinitis.    Follow-up in 6 months with repeat PFTs only. CT chest now.      aTmara Katz D.O.  Pulmonary/Critical Care and Lung Transplantation  [facility]

## 2025-08-04 NOTE — LETTER
August 4, 2025        Laurel Johnson  [address]  [address]  [place] [address]  Phone: [phone]  Fax: [phone]             [facility] 1st Fl  [address]  [facility] [address]  Phone: [phone]   Patient: Christen Bowman   MR Number: [id number]   YOB: 1965   Date of Visit: 8/4/2025       Dear Dr. Laurel Johnson    Thank you for referring Christen Bowman to me for evaluation. Attached you will find relevant portions of my assessment and plan of care.    If you have questions, please do not hesitate to call me. I look forward to following Christen Bowman along with you.    Sincerely,    Tamara Katz, DO    Enclosure    If you would like to receive this communication electronically, please contact [email or web address] or [phone] to request [employer] access.    [employer] is a tool which provides read-only access to select patient information with whom you have a relationship. Its easy to use and provides real time access to review your patients record including encounter summaries, notes, results, and demographic information.    If you feel you have received this communication in error or would no longer like to receive these types of communications, please e-mail [email or web address]

## 2025-08-04 NOTE — PROGRESS NOTES
7/30/2025     2:37 AM   Rapid3 Question Responses and Scores   MDHAQ Score 0.6   Psychologic Score 2.2   Pain Score 8   When you awakened in the morning OVER THE LAST WEEK, did you feel stiff? Yes   If Yes, please indicate the number of hours until you are as limber as you will be for the day 1   Fatigue Score 0.5   Global Health Score 3.5   RAPID3 Score 4.5     Answers submitted by the patient for this visit:  Rheumatology Questionnaire (Submitted on 7/30/2025)  fever: No  eye redness: Yes  mouth sores: No  headaches: No  shortness of breath: No  chest pain: No  trouble swallowing: No  diarrhea: No  constipation: No  unexpected weight change: No  genital sore: No  dysuria: No  During the last 3 days, have you had a skin rash?: No  Bruises or bleeds easily: No  cough: No

## 2025-08-05 DIAGNOSIS — J84.9 INTERSTITIAL PULMONARY DISEASE, UNSPECIFIED: Primary | ICD-10-CM

## 2025-08-20 ENCOUNTER — OFFICE VISIT (OUTPATIENT)
Dept: PHYSICAL MEDICINE AND REHAB | Facility: CLINIC | Age: 60
End: 2025-08-20
Payer: MEDICARE

## 2025-08-20 ENCOUNTER — HOSPITAL ENCOUNTER (OUTPATIENT)
Dept: RADIOLOGY | Facility: HOSPITAL | Age: 60
Discharge: HOME | End: 2025-08-20
Attending: INTERNAL MEDICINE
Payer: MEDICARE

## 2025-08-20 VITALS — HEIGHT: 66 IN | RESPIRATION RATE: 13 BRPM | WEIGHT: 155 LBS | BODY MASS INDEX: 24.91 KG/M2

## 2025-08-20 DIAGNOSIS — M46.1 SACROILIITIS, NOT ELSEWHERE CLASSIFIED: Primary | ICD-10-CM

## 2025-08-20 DIAGNOSIS — J84.9 INTERSTITIAL PULMONARY DISEASE, UNSPECIFIED: ICD-10-CM

## 2025-08-20 DIAGNOSIS — R29.898 WEAKNESS OF BOTH HIPS: ICD-10-CM

## 2025-08-20 DIAGNOSIS — G57.03 PIRIFORMIS SYNDROME OF BOTH SIDES: ICD-10-CM

## 2025-08-20 DIAGNOSIS — M62.830 SPASM OF BACK MUSCLES: ICD-10-CM

## 2025-08-20 DIAGNOSIS — J84.9 ILD (INTERSTITIAL LUNG DISEASE): Primary | ICD-10-CM

## 2025-08-20 PROCEDURE — 1160F RVW MEDS BY RX/DR IN RCRD: CPT | Mod: CPTII,S$GLB,TXP, | Performed by: PHYSICAL MEDICINE & REHABILITATION

## 2025-08-20 PROCEDURE — 99999 PR PBB SHADOW E&M-EST. PATIENT-LVL IV: CPT | Mod: PBBFAC,TXP,, | Performed by: PHYSICAL MEDICINE & REHABILITATION

## 2025-08-20 PROCEDURE — 71250 CT THORAX DX C-: CPT | Mod: TC,TXP

## 2025-08-20 PROCEDURE — 1159F MED LIST DOCD IN RCRD: CPT | Mod: CPTII,S$GLB,TXP, | Performed by: PHYSICAL MEDICINE & REHABILITATION

## 2025-08-20 PROCEDURE — 71250 CT THORAX DX C-: CPT | Mod: 26,NTX,, | Performed by: RADIOLOGY

## 2025-08-20 PROCEDURE — 3008F BODY MASS INDEX DOCD: CPT | Mod: CPTII,S$GLB,TXP, | Performed by: PHYSICAL MEDICINE & REHABILITATION

## 2025-08-20 PROCEDURE — 99204 OFFICE O/P NEW MOD 45 MIN: CPT | Mod: S$GLB,TXP,, | Performed by: PHYSICAL MEDICINE & REHABILITATION

## 2025-08-26 ENCOUNTER — TELEPHONE (OUTPATIENT)
Dept: TRANSPLANT | Facility: CLINIC | Age: 60
End: 2025-08-26
Payer: MEDICARE

## 2025-08-26 DIAGNOSIS — R93.89 ABNORMAL CT SCAN, CHEST: Primary | ICD-10-CM

## 2025-08-27 ENCOUNTER — HOSPITAL ENCOUNTER (OUTPATIENT)
Dept: RADIOLOGY | Facility: HOSPITAL | Age: 60
Discharge: HOME | End: 2025-08-27
Attending: NURSE PRACTITIONER
Payer: MEDICARE

## 2025-08-27 DIAGNOSIS — R93.89 ABNORMAL CT SCAN, CHEST: ICD-10-CM

## 2025-08-27 PROCEDURE — 25500020 PHARM REV CODE 255: Mod: TXP | Performed by: NURSE PRACTITIONER

## 2025-08-27 PROCEDURE — 70491 CT SOFT TISSUE NECK W/DYE: CPT | Mod: TC,TXP

## 2025-08-27 RX ADMIN — IOHEXOL 90 ML: 300 INJECTION, SOLUTION INTRAVENOUS at 09:08
